# Patient Record
Sex: FEMALE | Race: OTHER | HISPANIC OR LATINO
[De-identification: names, ages, dates, MRNs, and addresses within clinical notes are randomized per-mention and may not be internally consistent; named-entity substitution may affect disease eponyms.]

---

## 2017-01-05 ENCOUNTER — APPOINTMENT (OUTPATIENT)
Dept: OTOLARYNGOLOGY | Facility: CLINIC | Age: 82
End: 2017-01-05

## 2017-01-05 VITALS
SYSTOLIC BLOOD PRESSURE: 122 MMHG | DIASTOLIC BLOOD PRESSURE: 78 MMHG | HEART RATE: 67 BPM | WEIGHT: 165 LBS | BODY MASS INDEX: 33.26 KG/M2 | HEIGHT: 59 IN

## 2017-01-05 DIAGNOSIS — H90.3 SENSORINEURAL HEARING LOSS, BILATERAL: ICD-10-CM

## 2017-01-05 DIAGNOSIS — H61.23 IMPACTED CERUMEN, BILATERAL: ICD-10-CM

## 2017-02-02 ENCOUNTER — MEDICATION RENEWAL (OUTPATIENT)
Age: 82
End: 2017-02-02

## 2017-02-03 ENCOUNTER — MEDICATION RENEWAL (OUTPATIENT)
Age: 82
End: 2017-02-03

## 2017-02-12 ENCOUNTER — INPATIENT (INPATIENT)
Facility: HOSPITAL | Age: 82
LOS: 3 days | Discharge: HOME | End: 2017-02-16
Attending: HOSPITALIST | Admitting: HOSPITALIST
Payer: MEDICAID

## 2017-02-12 VITALS
OXYGEN SATURATION: 97 % | RESPIRATION RATE: 18 BRPM | HEART RATE: 112 BPM | SYSTOLIC BLOOD PRESSURE: 132 MMHG | TEMPERATURE: 101 F | DIASTOLIC BLOOD PRESSURE: 78 MMHG

## 2017-02-12 DIAGNOSIS — Z90.49 ACQUIRED ABSENCE OF OTHER SPECIFIED PARTS OF DIGESTIVE TRACT: Chronic | ICD-10-CM

## 2017-02-12 DIAGNOSIS — A41.9 SEPSIS, UNSPECIFIED ORGANISM: ICD-10-CM

## 2017-02-12 LAB
ALBUMIN SERPL ELPH-MCNC: 3.2 G/DL — LOW (ref 3.3–5)
ALP SERPL-CCNC: 100 U/L — SIGNIFICANT CHANGE UP (ref 40–120)
ALT FLD-CCNC: 13 U/L — SIGNIFICANT CHANGE UP (ref 4–33)
APPEARANCE UR: SIGNIFICANT CHANGE UP
AST SERPL-CCNC: 11 U/L — SIGNIFICANT CHANGE UP (ref 4–32)
B PERT DNA SPEC QL NAA+PROBE: SIGNIFICANT CHANGE UP
BACTERIA # UR AUTO: HIGH
BASE EXCESS BLDV CALC-SCNC: 0.8 MMOL/L — SIGNIFICANT CHANGE UP
BASE EXCESS BLDV CALC-SCNC: 0.9 MMOL/L — SIGNIFICANT CHANGE UP
BASOPHILS # BLD AUTO: 0.05 K/UL — SIGNIFICANT CHANGE UP (ref 0–0.2)
BASOPHILS NFR BLD AUTO: 0.3 % — SIGNIFICANT CHANGE UP (ref 0–2)
BILIRUB SERPL-MCNC: 0.5 MG/DL — SIGNIFICANT CHANGE UP (ref 0.2–1.2)
BILIRUB UR-MCNC: NEGATIVE — SIGNIFICANT CHANGE UP
BLOOD GAS VENOUS - CREATININE: 0.7 MG/DL — SIGNIFICANT CHANGE UP (ref 0.5–1.3)
BLOOD GAS VENOUS - CREATININE: 0.73 MG/DL — SIGNIFICANT CHANGE UP (ref 0.5–1.3)
BLOOD UR QL VISUAL: HIGH
BUN SERPL-MCNC: 18 MG/DL — SIGNIFICANT CHANGE UP (ref 7–23)
C PNEUM DNA SPEC QL NAA+PROBE: NOT DETECTED — SIGNIFICANT CHANGE UP
CALCIUM SERPL-MCNC: 9.7 MG/DL — SIGNIFICANT CHANGE UP (ref 8.4–10.5)
CHLORIDE BLDV-SCNC: 101 MMOL/L — SIGNIFICANT CHANGE UP (ref 96–108)
CHLORIDE BLDV-SCNC: 104 MMOL/L — SIGNIFICANT CHANGE UP (ref 96–108)
CHLORIDE SERPL-SCNC: 95 MMOL/L — LOW (ref 98–107)
CO2 SERPL-SCNC: 20 MMOL/L — LOW (ref 22–31)
COLOR SPEC: YELLOW — SIGNIFICANT CHANGE UP
CREAT SERPL-MCNC: 0.82 MG/DL — SIGNIFICANT CHANGE UP (ref 0.5–1.3)
EOSINOPHIL # BLD AUTO: 0.09 K/UL — SIGNIFICANT CHANGE UP (ref 0–0.5)
EOSINOPHIL NFR BLD AUTO: 0.6 % — SIGNIFICANT CHANGE UP (ref 0–6)
FLUAV H1 2009 PAND RNA SPEC QL NAA+PROBE: NOT DETECTED — SIGNIFICANT CHANGE UP
FLUAV H1 RNA SPEC QL NAA+PROBE: NOT DETECTED — SIGNIFICANT CHANGE UP
FLUAV H3 RNA SPEC QL NAA+PROBE: NOT DETECTED — SIGNIFICANT CHANGE UP
FLUAV SUBTYP SPEC NAA+PROBE: SIGNIFICANT CHANGE UP
FLUBV RNA SPEC QL NAA+PROBE: NOT DETECTED — SIGNIFICANT CHANGE UP
GAS PNL BLDV: 130 MMOL/L — LOW (ref 136–146)
GAS PNL BLDV: 133 MMOL/L — LOW (ref 136–146)
GLUCOSE BLDV-MCNC: 216 — HIGH (ref 70–99)
GLUCOSE BLDV-MCNC: 255 — HIGH (ref 70–99)
GLUCOSE SERPL-MCNC: 258 MG/DL — HIGH (ref 70–99)
GLUCOSE UR-MCNC: SIGNIFICANT CHANGE UP
HADV DNA SPEC QL NAA+PROBE: NOT DETECTED — SIGNIFICANT CHANGE UP
HCO3 BLDV-SCNC: 25 MMOL/L — SIGNIFICANT CHANGE UP (ref 20–27)
HCO3 BLDV-SCNC: 25 MMOL/L — SIGNIFICANT CHANGE UP (ref 20–27)
HCOV 229E RNA SPEC QL NAA+PROBE: NOT DETECTED — SIGNIFICANT CHANGE UP
HCOV HKU1 RNA SPEC QL NAA+PROBE: NOT DETECTED — SIGNIFICANT CHANGE UP
HCOV NL63 RNA SPEC QL NAA+PROBE: NOT DETECTED — SIGNIFICANT CHANGE UP
HCOV OC43 RNA SPEC QL NAA+PROBE: NOT DETECTED — SIGNIFICANT CHANGE UP
HCT VFR BLD CALC: 33.9 % — LOW (ref 34.5–45)
HCT VFR BLDV CALC: 30.9 % — LOW (ref 34.5–45)
HCT VFR BLDV CALC: 34.6 % — SIGNIFICANT CHANGE UP (ref 34.5–45)
HGB BLD-MCNC: 11.1 G/DL — LOW (ref 11.5–15.5)
HGB BLDV-MCNC: 10 G/DL — LOW (ref 11.5–15.5)
HGB BLDV-MCNC: 11.2 G/DL — LOW (ref 11.5–15.5)
HMPV RNA SPEC QL NAA+PROBE: NOT DETECTED — SIGNIFICANT CHANGE UP
HPIV1 RNA SPEC QL NAA+PROBE: NOT DETECTED — SIGNIFICANT CHANGE UP
HPIV2 RNA SPEC QL NAA+PROBE: NOT DETECTED — SIGNIFICANT CHANGE UP
HPIV3 RNA SPEC QL NAA+PROBE: NOT DETECTED — SIGNIFICANT CHANGE UP
HPIV4 RNA SPEC QL NAA+PROBE: NOT DETECTED — SIGNIFICANT CHANGE UP
IMM GRANULOCYTES NFR BLD AUTO: 0.3 % — SIGNIFICANT CHANGE UP (ref 0–1.5)
KETONES UR-MCNC: NEGATIVE — SIGNIFICANT CHANGE UP
LACTATE BLDV-MCNC: 1.3 MMOL/L — SIGNIFICANT CHANGE UP (ref 0.5–2)
LACTATE BLDV-MCNC: 2.6 MMOL/L — HIGH (ref 0.5–2)
LEUKOCYTE ESTERASE UR-ACNC: HIGH
LYMPHOCYTES # BLD AUTO: 19.6 % — SIGNIFICANT CHANGE UP (ref 13–44)
LYMPHOCYTES # BLD AUTO: 3.12 K/UL — SIGNIFICANT CHANGE UP (ref 1–3.3)
M PNEUMO DNA SPEC QL NAA+PROBE: NOT DETECTED — SIGNIFICANT CHANGE UP
MCHC RBC-ENTMCNC: 30.9 PG — SIGNIFICANT CHANGE UP (ref 27–34)
MCHC RBC-ENTMCNC: 32.7 % — SIGNIFICANT CHANGE UP (ref 32–36)
MCV RBC AUTO: 94.4 FL — SIGNIFICANT CHANGE UP (ref 80–100)
MONOCYTES # BLD AUTO: 1.15 K/UL — HIGH (ref 0–0.9)
MONOCYTES NFR BLD AUTO: 7.2 % — SIGNIFICANT CHANGE UP (ref 2–14)
MUCOUS THREADS # UR AUTO: SIGNIFICANT CHANGE UP
NEUTROPHILS # BLD AUTO: 11.47 K/UL — HIGH (ref 1.8–7.4)
NEUTROPHILS NFR BLD AUTO: 72 % — SIGNIFICANT CHANGE UP (ref 43–77)
NITRITE UR-MCNC: POSITIVE — HIGH
PCO2 BLDV: 37 MMHG — LOW (ref 41–51)
PCO2 BLDV: 38 MMHG — LOW (ref 41–51)
PH BLDV: 7.43 PH — SIGNIFICANT CHANGE UP (ref 7.32–7.43)
PH BLDV: 7.44 PH — HIGH (ref 7.32–7.43)
PH UR: 5.5 — SIGNIFICANT CHANGE UP (ref 4.6–8)
PLATELET # BLD AUTO: 645 K/UL — HIGH (ref 150–400)
PMV BLD: 10.5 FL — SIGNIFICANT CHANGE UP (ref 7–13)
PO2 BLDV: 67 MMHG — HIGH (ref 35–40)
PO2 BLDV: 82 MMHG — HIGH (ref 35–40)
POTASSIUM BLDV-SCNC: 3.5 MMOL/L — SIGNIFICANT CHANGE UP (ref 3.4–4.5)
POTASSIUM BLDV-SCNC: 3.5 MMOL/L — SIGNIFICANT CHANGE UP (ref 3.4–4.5)
POTASSIUM SERPL-MCNC: 3.8 MMOL/L — SIGNIFICANT CHANGE UP (ref 3.5–5.3)
POTASSIUM SERPL-SCNC: 3.8 MMOL/L — SIGNIFICANT CHANGE UP (ref 3.5–5.3)
PROT SERPL-MCNC: 7.7 G/DL — SIGNIFICANT CHANGE UP (ref 6–8.3)
PROT UR-MCNC: 30 — HIGH
RBC # BLD: 3.59 M/UL — LOW (ref 3.8–5.2)
RBC # FLD: 12.9 % — SIGNIFICANT CHANGE UP (ref 10.3–14.5)
RBC CASTS # UR COMP ASSIST: HIGH (ref 0–?)
RSV RNA SPEC QL NAA+PROBE: NOT DETECTED — SIGNIFICANT CHANGE UP
RV+EV RNA SPEC QL NAA+PROBE: NOT DETECTED — SIGNIFICANT CHANGE UP
SAO2 % BLDV: 94.1 % — HIGH (ref 60–85)
SAO2 % BLDV: 96.8 % — HIGH (ref 60–85)
SODIUM SERPL-SCNC: 136 MMOL/L — SIGNIFICANT CHANGE UP (ref 135–145)
SP GR SPEC: 1.02 — SIGNIFICANT CHANGE UP (ref 1–1.03)
SQUAMOUS # UR AUTO: SIGNIFICANT CHANGE UP
UROBILINOGEN FLD QL: NORMAL E.U. — SIGNIFICANT CHANGE UP (ref 0.1–0.2)
WBC # BLD: 15.93 K/UL — HIGH (ref 3.8–10.5)
WBC # FLD AUTO: 15.93 K/UL — HIGH (ref 3.8–10.5)
WBC UR QL: >50 — HIGH (ref 0–?)

## 2017-02-12 PROCEDURE — 71010: CPT | Mod: 26

## 2017-02-12 RX ORDER — SODIUM CHLORIDE 9 MG/ML
1000 INJECTION INTRAMUSCULAR; INTRAVENOUS; SUBCUTANEOUS ONCE
Qty: 0 | Refills: 0 | Status: COMPLETED | OUTPATIENT
Start: 2017-02-12 | End: 2017-02-12

## 2017-02-12 RX ORDER — CEFTRIAXONE 500 MG/1
1 INJECTION, POWDER, FOR SOLUTION INTRAMUSCULAR; INTRAVENOUS ONCE
Qty: 0 | Refills: 0 | Status: COMPLETED | OUTPATIENT
Start: 2017-02-12 | End: 2017-02-12

## 2017-02-12 RX ORDER — ACETAMINOPHEN 500 MG
650 TABLET ORAL ONCE
Qty: 0 | Refills: 0 | Status: COMPLETED | OUTPATIENT
Start: 2017-02-12 | End: 2017-02-12

## 2017-02-12 RX ORDER — MIDAZOLAM HYDROCHLORIDE 1 MG/ML
2 INJECTION, SOLUTION INTRAMUSCULAR; INTRAVENOUS ONCE
Qty: 0 | Refills: 0 | Status: DISCONTINUED | OUTPATIENT
Start: 2017-02-12 | End: 2017-02-12

## 2017-02-12 RX ADMIN — MIDAZOLAM HYDROCHLORIDE 2 MILLIGRAM(S): 1 INJECTION, SOLUTION INTRAMUSCULAR; INTRAVENOUS at 21:26

## 2017-02-12 RX ADMIN — CEFTRIAXONE 100 GRAM(S): 500 INJECTION, POWDER, FOR SOLUTION INTRAMUSCULAR; INTRAVENOUS at 21:26

## 2017-02-12 RX ADMIN — SODIUM CHLORIDE 1000 MILLILITER(S): 9 INJECTION INTRAMUSCULAR; INTRAVENOUS; SUBCUTANEOUS at 21:01

## 2017-02-12 RX ADMIN — Medication 650 MILLIGRAM(S): at 22:23

## 2017-02-12 NOTE — ED ADULT TRIAGE NOTE - CHIEF COMPLAINT QUOTE
Cambodian speaking. history from daughter. pt with alzheimer's. reported fever this am with c/o abd pains.headache.  denies vomiting. decreased appetite Anguillan speaking. history from daughter. pt with alzheimer's. reported fever this am with c/o abd pains.headache.  denies vomiting. decreased appetite. fs 251

## 2017-02-12 NOTE — ED PROVIDER NOTE - ATTENDING CONTRIBUTION TO CARE
Seen and examined, AMS per family member with episodic agitation and lethargy, +fever, dark urine. Pt. hostile in triage but cooperative at time of exam. MM sl. dry, neck supple, clear lungs, shallow resp, unlabored, heart reg., soft, NT abd, no CVAT, moves all ext.

## 2017-02-12 NOTE — ED PROVIDER NOTE - OBJECTIVE STATEMENT
81F h/o Alzheimer's, HTN, DM, hypothyroid presenting with fever. Family notes pt has had subjective fever today, generalized weakness, confusion, agitation associated with dark urine over the past few days. Pt is agitated at being in hospital and not answering questions, consolable by family members. Denies cough, CP/SOB, N/V/D.

## 2017-02-12 NOTE — ED ADULT NURSE NOTE - OBJECTIVE STATEMENT
pt alert but confusing and agitated, ambulatory with assist, Botswanan speaking only. History from daughter. pt with alzheimer's and UTI. Reported fever this am with c/o abd pains and headache with weakness. Denies vomiting. Evaluated by provider. Blood obtained and sent. IV inserted. Right arm 20G. Will continue to monitor.

## 2017-02-12 NOTE — ED PROVIDER NOTE - MEDICAL DECISION MAKING DETAILS
81F h/o alzheimer's, hypothyroid, HTN presenting with fever, agitation, malaise with dark urine, concerning for underlying UTI  -labs, u/a, abx, cxr 81F h/o alzheimer's, hypothyroid, HTN presenting with fever, agitation, malaise with dark urine, likely delirium, concerning for underlying UTI  -labs, u/a, abx, cxr

## 2017-02-12 NOTE — ED ADULT NURSE REASSESSMENT NOTE - NS ED NURSE REASSESS COMMENT FT1
pt confusing and agitated to finish all the tests with anxiety. Due medication given as ordered. Cardiac monitor in place/ sinus. Will continue to monitor. Cxr on schedule. EKG on schedule.

## 2017-02-13 DIAGNOSIS — Z41.8 ENCOUNTER FOR OTHER PROCEDURES FOR PURPOSES OTHER THAN REMEDYING HEALTH STATE: ICD-10-CM

## 2017-02-13 DIAGNOSIS — I10 ESSENTIAL (PRIMARY) HYPERTENSION: ICD-10-CM

## 2017-02-13 DIAGNOSIS — A41.9 SEPSIS, UNSPECIFIED ORGANISM: ICD-10-CM

## 2017-02-13 DIAGNOSIS — E11.9 TYPE 2 DIABETES MELLITUS WITHOUT COMPLICATIONS: ICD-10-CM

## 2017-02-13 DIAGNOSIS — E03.9 HYPOTHYROIDISM, UNSPECIFIED: ICD-10-CM

## 2017-02-13 DIAGNOSIS — G30.9 ALZHEIMER'S DISEASE, UNSPECIFIED: ICD-10-CM

## 2017-02-13 DIAGNOSIS — E87.2 ACIDOSIS: ICD-10-CM

## 2017-02-13 DIAGNOSIS — D64.9 ANEMIA, UNSPECIFIED: ICD-10-CM

## 2017-02-13 LAB
BASOPHILS # BLD AUTO: 0.03 K/UL — SIGNIFICANT CHANGE UP (ref 0–0.2)
BASOPHILS NFR BLD AUTO: 0.2 % — SIGNIFICANT CHANGE UP (ref 0–2)
BUN SERPL-MCNC: 14 MG/DL — SIGNIFICANT CHANGE UP (ref 7–23)
CALCIUM SERPL-MCNC: 8.7 MG/DL — SIGNIFICANT CHANGE UP (ref 8.4–10.5)
CHLORIDE SERPL-SCNC: 103 MMOL/L — SIGNIFICANT CHANGE UP (ref 98–107)
CO2 SERPL-SCNC: 21 MMOL/L — LOW (ref 22–31)
CREAT SERPL-MCNC: 0.69 MG/DL — SIGNIFICANT CHANGE UP (ref 0.5–1.3)
EOSINOPHIL # BLD AUTO: 0.15 K/UL — SIGNIFICANT CHANGE UP (ref 0–0.5)
EOSINOPHIL NFR BLD AUTO: 1 % — SIGNIFICANT CHANGE UP (ref 0–6)
GLUCOSE SERPL-MCNC: 188 MG/DL — HIGH (ref 70–99)
HBA1C BLD-MCNC: 8.6 % — HIGH (ref 4–5.6)
HCT VFR BLD CALC: 33.3 % — LOW (ref 34.5–45)
HGB BLD-MCNC: 10.7 G/DL — LOW (ref 11.5–15.5)
IMM GRANULOCYTES NFR BLD AUTO: 0.2 % — SIGNIFICANT CHANGE UP (ref 0–1.5)
IRON SATN MFR SERPL: 169 UG/DL — SIGNIFICANT CHANGE UP (ref 140–530)
IRON SATN MFR SERPL: 28 UG/DL — LOW (ref 30–160)
LYMPHOCYTES # BLD AUTO: 18.7 % — SIGNIFICANT CHANGE UP (ref 13–44)
LYMPHOCYTES # BLD AUTO: 2.83 K/UL — SIGNIFICANT CHANGE UP (ref 1–3.3)
MAGNESIUM SERPL-MCNC: 1.7 MG/DL — SIGNIFICANT CHANGE UP (ref 1.6–2.6)
MCHC RBC-ENTMCNC: 30.7 PG — SIGNIFICANT CHANGE UP (ref 27–34)
MCHC RBC-ENTMCNC: 32.1 % — SIGNIFICANT CHANGE UP (ref 32–36)
MCV RBC AUTO: 95.4 FL — SIGNIFICANT CHANGE UP (ref 80–100)
MONOCYTES # BLD AUTO: 0.91 K/UL — HIGH (ref 0–0.9)
MONOCYTES NFR BLD AUTO: 6 % — SIGNIFICANT CHANGE UP (ref 2–14)
NEUTROPHILS # BLD AUTO: 11.19 K/UL — HIGH (ref 1.8–7.4)
NEUTROPHILS NFR BLD AUTO: 73.9 % — SIGNIFICANT CHANGE UP (ref 43–77)
PHOSPHATE SERPL-MCNC: 2.5 MG/DL — SIGNIFICANT CHANGE UP (ref 2.5–4.5)
PLATELET # BLD AUTO: 564 K/UL — HIGH (ref 150–400)
PMV BLD: 10.1 FL — SIGNIFICANT CHANGE UP (ref 7–13)
POTASSIUM SERPL-MCNC: 3.9 MMOL/L — SIGNIFICANT CHANGE UP (ref 3.5–5.3)
POTASSIUM SERPL-SCNC: 3.9 MMOL/L — SIGNIFICANT CHANGE UP (ref 3.5–5.3)
RBC # BLD: 3.49 M/UL — LOW (ref 3.8–5.2)
RBC # FLD: 13.2 % — SIGNIFICANT CHANGE UP (ref 10.3–14.5)
SODIUM SERPL-SCNC: 140 MMOL/L — SIGNIFICANT CHANGE UP (ref 135–145)
SPECIMEN SOURCE: SIGNIFICANT CHANGE UP
SPECIMEN SOURCE: SIGNIFICANT CHANGE UP
TSH SERPL-MCNC: 0.29 UIU/ML — SIGNIFICANT CHANGE UP (ref 0.27–4.2)
UIBC SERPL-MCNC: 141 UG/DL — SIGNIFICANT CHANGE UP (ref 110–370)
VIT B12 SERPL-MCNC: 286 PG/ML — SIGNIFICANT CHANGE UP (ref 200–900)
WBC # BLD: 15.14 K/UL — HIGH (ref 3.8–10.5)
WBC # FLD AUTO: 15.14 K/UL — HIGH (ref 3.8–10.5)

## 2017-02-13 PROCEDURE — 76770 US EXAM ABDO BACK WALL COMP: CPT | Mod: 26

## 2017-02-13 PROCEDURE — 99223 1ST HOSP IP/OBS HIGH 75: CPT | Mod: GC

## 2017-02-13 PROCEDURE — 76775 US EXAM ABDO BACK WALL LIM: CPT | Mod: 26

## 2017-02-13 RX ORDER — HALOPERIDOL DECANOATE 100 MG/ML
1 INJECTION INTRAMUSCULAR ONCE
Qty: 0 | Refills: 0 | Status: COMPLETED | OUTPATIENT
Start: 2017-02-13 | End: 2017-02-13

## 2017-02-13 RX ORDER — DEXTROSE 50 % IN WATER 50 %
25 SYRINGE (ML) INTRAVENOUS ONCE
Qty: 0 | Refills: 0 | Status: DISCONTINUED | OUTPATIENT
Start: 2017-02-13 | End: 2017-02-16

## 2017-02-13 RX ORDER — GLUCAGON INJECTION, SOLUTION 0.5 MG/.1ML
1 INJECTION, SOLUTION SUBCUTANEOUS ONCE
Qty: 0 | Refills: 0 | Status: DISCONTINUED | OUTPATIENT
Start: 2017-02-13 | End: 2017-02-16

## 2017-02-13 RX ORDER — CEFTRIAXONE 500 MG/1
1 INJECTION, POWDER, FOR SOLUTION INTRAMUSCULAR; INTRAVENOUS EVERY 24 HOURS
Qty: 0 | Refills: 0 | Status: DISCONTINUED | OUTPATIENT
Start: 2017-02-13 | End: 2017-02-16

## 2017-02-13 RX ORDER — ACETAMINOPHEN 500 MG
650 TABLET ORAL EVERY 6 HOURS
Qty: 0 | Refills: 0 | Status: DISCONTINUED | OUTPATIENT
Start: 2017-02-13 | End: 2017-02-16

## 2017-02-13 RX ORDER — ENOXAPARIN SODIUM 100 MG/ML
40 INJECTION SUBCUTANEOUS EVERY 24 HOURS
Qty: 0 | Refills: 0 | Status: DISCONTINUED | OUTPATIENT
Start: 2017-02-13 | End: 2017-02-16

## 2017-02-13 RX ORDER — LEVOTHYROXINE SODIUM 125 MCG
100 TABLET ORAL DAILY
Qty: 0 | Refills: 0 | Status: DISCONTINUED | OUTPATIENT
Start: 2017-02-13 | End: 2017-02-16

## 2017-02-13 RX ORDER — SODIUM CHLORIDE 9 MG/ML
1000 INJECTION INTRAMUSCULAR; INTRAVENOUS; SUBCUTANEOUS ONCE
Qty: 0 | Refills: 0 | Status: COMPLETED | OUTPATIENT
Start: 2017-02-13 | End: 2017-02-13

## 2017-02-13 RX ORDER — INSULIN LISPRO 100/ML
VIAL (ML) SUBCUTANEOUS AT BEDTIME
Qty: 0 | Refills: 0 | Status: DISCONTINUED | OUTPATIENT
Start: 2017-02-13 | End: 2017-02-16

## 2017-02-13 RX ORDER — INFLUENZA VIRUS VACCINE 15; 15; 15; 15 UG/.5ML; UG/.5ML; UG/.5ML; UG/.5ML
0.5 SUSPENSION INTRAMUSCULAR ONCE
Qty: 0 | Refills: 0 | Status: COMPLETED | OUTPATIENT
Start: 2017-02-13 | End: 2017-02-13

## 2017-02-13 RX ORDER — SODIUM CHLORIDE 9 MG/ML
1000 INJECTION INTRAMUSCULAR; INTRAVENOUS; SUBCUTANEOUS
Qty: 0 | Refills: 0 | Status: DISCONTINUED | OUTPATIENT
Start: 2017-02-13 | End: 2017-02-16

## 2017-02-13 RX ORDER — SODIUM CHLORIDE 9 MG/ML
1000 INJECTION, SOLUTION INTRAVENOUS
Qty: 0 | Refills: 0 | Status: DISCONTINUED | OUTPATIENT
Start: 2017-02-13 | End: 2017-02-16

## 2017-02-13 RX ORDER — ASPIRIN/CALCIUM CARB/MAGNESIUM 324 MG
81 TABLET ORAL DAILY
Qty: 0 | Refills: 0 | Status: DISCONTINUED | OUTPATIENT
Start: 2017-02-13 | End: 2017-02-16

## 2017-02-13 RX ORDER — INSULIN LISPRO 100/ML
VIAL (ML) SUBCUTANEOUS
Qty: 0 | Refills: 0 | Status: DISCONTINUED | OUTPATIENT
Start: 2017-02-13 | End: 2017-02-16

## 2017-02-13 RX ORDER — DEXTROSE 50 % IN WATER 50 %
12.5 SYRINGE (ML) INTRAVENOUS ONCE
Qty: 0 | Refills: 0 | Status: DISCONTINUED | OUTPATIENT
Start: 2017-02-13 | End: 2017-02-16

## 2017-02-13 RX ORDER — DEXTROSE 50 % IN WATER 50 %
1 SYRINGE (ML) INTRAVENOUS ONCE
Qty: 0 | Refills: 0 | Status: DISCONTINUED | OUTPATIENT
Start: 2017-02-13 | End: 2017-02-16

## 2017-02-13 RX ADMIN — Medication 1 TABLET(S): at 13:11

## 2017-02-13 RX ADMIN — Medication 100 MICROGRAM(S): at 07:05

## 2017-02-13 RX ADMIN — Medication 1 TABLET(S): at 22:43

## 2017-02-13 RX ADMIN — Medication 81 MILLIGRAM(S): at 11:16

## 2017-02-13 RX ADMIN — SODIUM CHLORIDE 75 MILLILITER(S): 9 INJECTION INTRAMUSCULAR; INTRAVENOUS; SUBCUTANEOUS at 13:12

## 2017-02-13 RX ADMIN — Medication 1: at 18:07

## 2017-02-13 RX ADMIN — HALOPERIDOL DECANOATE 1 MILLIGRAM(S): 100 INJECTION INTRAMUSCULAR at 22:43

## 2017-02-13 RX ADMIN — SODIUM CHLORIDE 75 MILLILITER(S): 9 INJECTION INTRAMUSCULAR; INTRAVENOUS; SUBCUTANEOUS at 03:30

## 2017-02-13 RX ADMIN — ENOXAPARIN SODIUM 40 MILLIGRAM(S): 100 INJECTION SUBCUTANEOUS at 11:16

## 2017-02-13 RX ADMIN — Medication 1 TABLET(S): at 07:05

## 2017-02-13 RX ADMIN — SODIUM CHLORIDE 1000 MILLILITER(S): 9 INJECTION INTRAMUSCULAR; INTRAVENOUS; SUBCUTANEOUS at 01:15

## 2017-02-13 NOTE — H&P ADULT. - PROBLEM SELECTOR PLAN 6
– Per dtr report pt on Aricept 5 qd, however outpt notes say pt was switched to namenda due to dry mouth. Severity of baseline dementia suggests both meds may no longer be beneficial either way, will hold for now  – Pt calm currently, redirectable by family. QTc 427, if agitated will consider starting seroquel (pt may benefit from remeron given dec appetite)

## 2017-02-13 NOTE — DISCHARGE NOTE ADULT - HOSPITAL COURSE
80F w/PMHx HTN, T2DM (NIDDM), hypothyroidism, osteoporosis, Alzheimer’s dementia (AAOx1 @baseline) who p/w generalized weakness, confusion, and agitation x3d and fever x1d. Pt brought in by family, agitated, refusing to answer questions, hx obtained primarily by family at bedside and from EMR/chart review. German-speaking from [facility], refusing  phone. Per dtr, pt has been having worsening confusion/agitation x3d w/weakness to the point she couldn’t stand, dec PO intake x1d, and had fever to 102 prompting ED visit. Dtr says pt did not complain of CP/palp/SOB, cough, abd pain, N/V/D, dysuria, hematuria, or flank pain. Of note, in 12/2016 dtr brought pt to gynecologist for foul-smelling, dark urine, ucx grew pan-sensitive E. coli (>[id number]), tx w/10d course of macrobid w/resolution of sx. Dtr denies dark, foul-smelling urine, but states urine is "more yellow" than usual. Dtr also notes pt has remote hx (>20y ago) of large "calcium" renal stones x3, requiring surgical removal.  In ED febrile to 102.3, tachycardic to 112, normotensive, satting well on RA. CBC w/leukocytosis, normocytic anemia (11.1 down from baseline ~13 on outpt labs), thrombocytosis. BMP w/no renal dysfunction, no major electrolyte abn, hyperglycemia to 258, HAGMA (HCO3 20, AG 23). LFTs WNL. VBG w/low PCO2 (38), lactate 2.6, down to 1.3 on repeat. UA grossly positive w/bacteriuria, pyuria, large LE, positive nitrite. Also noted moderate hematuria. RVP neg. CXR w/grossly clear lungs. Pt given ceftriaxone, 2LNS bolus, Tylenol 650 Pox1, versed 2mg IVP x1 for agitation, and admitted to medicine.    Patient was admitted to the general medicine floors for further management. A renal sonogram showed marked R hydronephrosis with a mass-like lesion in the R renal pelvis extending into the proximal R ureter. A CT a/p w/ IV contrast was obtained to assess showing ________ 80F w/PMHx HTN, T2DM (NIDDM), hypothyroidism, osteoporosis, Alzheimer’s dementia (AAOx1 @baseline) who p/w generalized weakness, confusion, and agitation x3d and fever x1d. Pt brought in by family, agitated, refusing to answer questions, hx obtained primarily by family at bedside and from EMR/chart review. [place], refusing  phone. Per dtr, pt has been having worsening confusion/agitation x3d w/weakness to the point she couldn’t stand, dec PO intake x1d, and had fever to 102 prompting ED visit. Dtr says pt did not complain of CP/palp/SOB, cough, abd pain, N/V/D, dysuria, hematuria, or flank pain. Of note, in 12/2016 dtr brought pt to gynecologist for foul-smelling, dark urine, ucx grew pan-sensitive E. coli (>[id number]), tx w/10d course of macrobid w/resolution of sx. Dtr denies dark, foul-smelling urine, but states urine is "more yellow" than usual. Dtr also notes pt has remote hx (>20y ago) of large "calcium" renal stones x3, requiring surgical removal.  In ED febrile to 102.3, tachycardic to 112, normotensive, satting well on RA. CBC w/leukocytosis, normocytic anemia (11.1 down from baseline ~13 on outpt labs), thrombocytosis. BMP w/no renal dysfunction, no major electrolyte abn, hyperglycemia to 258, HAGMA (HCO3 20, AG 23). LFTs WNL. VBG w/low PCO2 (38), lactate 2.6, down to 1.3 on repeat. UA grossly positive w/bacteriuria, pyuria, large LE, positive nitrite. Also noted moderate hematuria. RVP neg. CXR w/grossly clear lungs. Pt given ceftriaxone, 2LNS bolus, Tylenol 650 Pox1, versed 2mg IVP x1 for agitation, and admitted to medicine.    Patient was admitted to the general medicine floors for further management. A renal sonogram showed marked R hydronephrosis with a mass-like lesion in the R renal pelvis extending into the proximal R ureter. A CT a/p w/ IV contrast was obtained to assess confirming marked right hydroureteronephrosis with site of obstruction at the level of the iliac vessels. A heterogeneous nonenhancing masslike density in the right renal pelvis most likely representing an inflammatory mass. An incidental phlegmon in the right psoas muscle adjacent to the renal pelvis was detected for which IR was consulted, will not be drained and will monitor. Urology consulted and recommended for renal lasix scan which showed abnormal diuretic renal scan.    Markedly decreased flow to and function of  the right kidney with   evidence of obstruction.     Normal flow to and function of  the left kidney with no evidence of   obstruction.     Differential renal function: Right kidney: 16%; Left kidney: 84%. 80F w/PMHx HTN, T2DM (NIDDM), hypothyroidism, osteoporosis, Alzheimer’s dementia (AAOx1 @baseline) who p/w generalized weakness, confusion, and agitation x3d and fever x1d. Pt brought in by family, agitated, refusing to answer questions, hx obtained primarily by family at bedside and from EMR/chart review. Telugu-speaking from [facility], refusing  phone. Per dtr, pt has been having worsening confusion/agitation x3d w/weakness to the point she couldn’t stand, dec PO intake x1d, and had fever to 102 prompting ED visit. Dtr says pt did not complain of CP/palp/SOB, cough, abd pain, N/V/D, dysuria, hematuria, or flank pain. Of note, in 12/2016 dtr brought pt to gynecologist for foul-smelling, dark urine, ucx grew pan-sensitive E. coli (>[id number]), tx w/10d course of macrobid w/resolution of sx. Dtr denies dark, foul-smelling urine, but states urine is "more yellow" than usual. Dtr also notes pt has remote hx (>20y ago) of large "calcium" renal stones x3, requiring surgical removal.  In ED febrile to 102.3, tachycardic to 112, normotensive, satting well on RA. CBC w/leukocytosis, normocytic anemia (11.1 down from baseline ~13 on outpt labs), thrombocytosis. BMP w/no renal dysfunction, no major electrolyte abn, hyperglycemia to 258, HAGMA (HCO3 20, AG 23). LFTs WNL. VBG w/low PCO2 (38), lactate 2.6, down to 1.3 on repeat. UA grossly positive w/bacteriuria, pyuria, large LE, positive nitrite. Also noted moderate hematuria. RVP neg. CXR w/grossly clear lungs. Pt given ceftriaxone, 2LNS bolus, Tylenol 650 Pox1, versed 2mg IVP x1 for agitation, and admitted to medicine.    Patient was admitted to the general medicine floors for further management. A renal sonogram showed marked R hydronephrosis with a mass-like lesion in the R renal pelvis extending into the proximal R ureter. A CT a/p w/ IV contrast was obtained to assess, confirming marked right hydroureteronephrosis with site of obstruction at the level of the iliac vessels. A heterogeneous nonenhancing masslike density in the right renal pelvis most likely representing an inflammatory mass. An incidental phlegmon in the right psoas muscle adjacent to the renal pelvis was detected for which IR was consulted, will not be drained and will monitor. Urology consulted and recommended for renal lasix scan which showed abnormal diuretic renal scan. There was markedly decreased flow to and function of  the right kidney with evidence of obstruction. There was normal flow to and function of  the left kidney with no evidence of obstruction. The differential renal function was Right kidney: 16%; Left kidney: 84%. Urology aware and recommended follow-up as an outpatient for uteroscopy. Appointment made for 3/3/17 @ 2pm with Dr. Anderson as preferred by patient's family. Urine culture positive for pan-sensitive E. coli for which pt will be sent home on keflex x 3 days for a 7 day total course of antibiotics. Patient medically stable on discharge. 80F w/PMHx HTN, T2DM (NIDDM), hypothyroidism, osteoporosis, Alzheimer’s dementia (AAOx1 @baseline) who p/w generalized weakness, confusion, and agitation x3d and fever x1d. Pt brought in by family, agitated, refusing to answer questions, hx obtained primarily by family at bedside and from EMR/chart review. [facility]. Per dtr, pt has been having worsening confusion/agitation x3d w/weakness to the point she couldn’t stand, dec PO intake x1d, and had fever to 102 prompting ED visit. Dtr says pt did not complain of CP/palp/SOB, cough, abd pain, N/V/D, dysuria, hematuria, or flank pain. Of note, in 12/2016 dtr brought pt to gynecologist for foul-smelling, dark urine, ucx grew pan-sensitive E. coli (>[id number]), tx w/10d course of macrobid w/resolution of sx. Dtr denies dark, foul-smelling urine, but states urine is "more yellow" than usual. Dtr also notes pt has remote hx (>20y ago) of large "calcium" renal stones x3, requiring surgical removal.  In ED febrile to 102.3, tachycardic to 112, normotensive, satting well on RA. CBC w/leukocytosis, normocytic anemia (11.1 down from baseline ~13 on outpt labs), thrombocytosis. BMP w/no renal dysfunction, no major electrolyte abn, hyperglycemia to 258, HAGMA (HCO3 20, AG 23). LFTs WNL. VBG w/low PCO2 (38), lactate 2.6, down to 1.3 on repeat. UA grossly positive w/bacteriuria, pyuria, large LE, positive nitrite. Also noted moderate hematuria. RVP neg. CXR w/grossly clear lungs. Pt given ceftriaxone, 2LNS bolus, Tylenol 650 Pox1, versed 2mg IVP x1 for agitation, and admitted to medicine.    Patient was admitted to the general medicine floors for further management. A renal sonogram showed marked R hydronephrosis with a mass-like lesion in the R renal pelvis extending into the proximal R ureter. A CT a/p w/ IV contrast was obtained to assess, confirming marked right hydroureteronephrosis with site of obstruction at the level of the iliac vessels. A heterogeneous nonenhancing masslike density in the right renal pelvis most likely representing an inflammatory mass. An incidental phlegmon in the right psoas muscle adjacent to the renal pelvis was detected for which IR was consulted, will not be drained and will monitor. Urology consulted and recommended for renal lasix scan which showed abnormal diuretic renal scan. There was markedly decreased flow to and function of  the right kidney with evidence of obstruction. There was normal flow to and function of  the left kidney with no evidence of obstruction. The differential renal function was Right kidney: 16%; Left kidney: 84%. Urology aware and recommended follow-up as an outpatient for uteroscopy. Appointment made for 3/3/17 @ 2pm with Dr. Anderson as preferred by patient's family. Urine culture positive for pan-sensitive E. coli for which pt will be sent home on keflex for a 14 day total course of antibiotics. Patient medically stable on discharge.

## 2017-02-13 NOTE — H&P ADULT. - PROBLEM SELECTOR PLAN 4
– Per dtr pt on synthroid 100 qd, however outpt records indicate 75qd. TSH 11/2016 WNL, will c/w synthroid 100 and check TSH in AM

## 2017-02-13 NOTE — H&P ADULT. - LAB RESULTS AND INTERPRETATION
Labs personally reviewed.CBC w/leukocytosis, normocytic anemia (11.1 down from baseline ~13 on outpt labs), thrombocytosis. BMP w/no renal dysfunction, no major electrolyte abn, hyperglycemia to 258, HAGMA (HCO3 20, AG 23). LFTs WNL. VBG w/low PCO2 (38), lactate 2.6, down to 1.3 on repeat. UA grossly positive w/bacteriuria, pyuria, large LE, positive nitrite. Also noted moderate hematuria. RVP neg.

## 2017-02-13 NOTE — DISCHARGE NOTE ADULT - CARE PROVIDERS DIRECT ADDRESSES
[email or web address]

## 2017-02-13 NOTE — DISCHARGE NOTE ADULT - PLAN OF CARE
Resolution Please follow-up with Urology Clinic (Dr. Anderson) on _________ for further assessment and ureteroscopy. If symptoms worsen, please return to the ED. Please continue with synthroid and follow-up with your PCP Dr. Guerra for further management. Please maintain a carbohydrate consistent diet and follow-up with Dr. Guerra within 2-4 weeks after discharge for further management. Please monitor blood pressure at home and continue anti-HTN medications and follow-up with Dr. Guerra for further management. Please follow-up with Dr. Guerra or your neurologist for further management. Please follow-up with [facility]rology Clinic (Dr. Anderson) on March 3, 2017 @ 2PM for further assessment and ureteroscopy. If symptoms worsen, please return to the ED. Please maintain a carbohydrate consistent diet and follow-up with Dr. Guerra for T2DM management on 5/1/2017 @ 3:40PM, instructions left for PCP office to call for more recent openings. Please continue keflex 500mg twice daily x 3 days to complete a 7 day course for UTI. Please follow-up with Urology Clinic (Dr. Anderson) on March 3, 2017 @ 2PM for further assessment and ureteroscopy. If symptoms worsen, please return to the ED. Please continue keflex 500mg twice daily to complete a 14 day course for UTI. Please follow-up with Urology Clinic (Dr. Anderson) on March 3, 2017 @ 2PM for further assessment and ureteroscopy. If symptoms worsen, please return to the ED.

## 2017-02-13 NOTE — H&P ADULT. - ASSESSMENT
80F w/PMHx HTN, T2DM (NIDDM), hypothyroidism, osteoporosis, Alzheimer’s dementia (AAOx1 @baseline) who p/w generalized weakness, confusion, and agitation x3d and fever x1d, found to have UTI.

## 2017-02-13 NOTE — DISCHARGE NOTE ADULT - CARE PLAN
Principal Discharge DX:	Sepsis secondary to UTI  Goal:	Resolution  Instructions for follow-up, activity and diet:	Please follow-up with Urology Clinic (Dr. Anderson) on _________ for further assessment and ureteroscopy. If symptoms worsen, please return to the ED.  Secondary Diagnosis:	Hypothyroid  Instructions for follow-up, activity and diet:	Please continue with synthroid and follow-up with your PCP Dr. Guerra for further management.  Secondary Diagnosis:	T2DM (type 2 diabetes mellitus)  Instructions for follow-up, activity and diet:	Please maintain a carbohydrate consistent diet and follow-up with Dr. Guerra within 2-4 weeks after discharge for further management.  Secondary Diagnosis:	HTN (hypertension)  Instructions for follow-up, activity and diet:	Please monitor blood pressure at home and continue anti-HTN medications and follow-up with Dr. Guerra for further management.  Secondary Diagnosis:	Alzheimer disease  Instructions for follow-up, activity and diet:	Please follow-up with Dr. Guerra or your neurologist for further management. Principal Discharge DX:	Sepsis secondary to UTI  Goal:	Resolution  Instructions for follow-up, activity and diet:	Please follow-up with Urology Clinic (Dr. Anderson) on March 3, 2017 @ 2PM for further assessment and ureteroscopy. If symptoms worsen, please return to the ED.  Secondary Diagnosis:	Hypothyroid  Instructions for follow-up, activity and diet:	Please continue with synthroid and follow-up with your PCP Dr. Guerra for further management.  Secondary Diagnosis:	T2DM (type 2 diabetes mellitus)  Instructions for follow-up, activity and diet:	Please maintain a carbohydrate consistent diet and follow-up with Dr. Guerra within 2-4 weeks after discharge for further management.  Secondary Diagnosis:	HTN (hypertension)  Instructions for follow-up, activity and diet:	Please monitor blood pressure at home and continue anti-HTN medications and follow-up with Dr. Guerra for further management.  Secondary Diagnosis:	Alzheimer disease  Instructions for follow-up, activity and diet:	Please follow-up with Dr. Guerra or your neurologist for further management. Principal Discharge DX:	Sepsis secondary to UTI  Goal:	Resolution  Instructions for follow-up, activity and diet:	Please follow-up with Urology Clinic (Dr. Anderson) on March 3, 2017 @ 2PM for further assessment and ureteroscopy. If symptoms worsen, please return to the ED.  Secondary Diagnosis:	Hypothyroid  Instructions for follow-up, activity and diet:	Please continue with synthroid and follow-up with your PCP Dr. Guerra for further management.  Secondary Diagnosis:	T2DM (type 2 diabetes mellitus)  Instructions for follow-up, activity and diet:	Please maintain a carbohydrate consistent diet and follow-up with Dr. Guerra for T2DM management on 5/1/2017 @ 3:40PM, instructions left for PCP office to call for more recent openings.  Secondary Diagnosis:	HTN (hypertension)  Instructions for follow-up, activity and diet:	Please monitor blood pressure at home and continue anti-HTN medications and follow-up with Dr. Guerra for further management.  Secondary Diagnosis:	Alzheimer disease  Instructions for follow-up, activity and diet:	Please follow-up with Dr. Guerra or your neurologist for further management. Principal Discharge DX:	Sepsis secondary to UTI  Goal:	Resolution  Instructions for follow-up, activity and diet:	Please continue keflex 500mg twice daily x 3 days to complete a 7 day course for UTI. Please follow-up with Urology Clinic (Dr. Anderson) on March 3, 2017 @ 2PM for further assessment and ureteroscopy. If symptoms worsen, please return to the ED.  Secondary Diagnosis:	Hypothyroid  Instructions for follow-up, activity and diet:	Please continue with synthroid and follow-up with your PCP Dr. Guerra for further management.  Secondary Diagnosis:	T2DM (type 2 diabetes mellitus)  Instructions for follow-up, activity and diet:	Please maintain a carbohydrate consistent diet and follow-up with Dr. Guerra for T2DM management on 5/1/2017 @ 3:40PM, instructions left for PCP office to call for more recent openings.  Secondary Diagnosis:	HTN (hypertension)  Instructions for follow-up, activity and diet:	Please monitor blood pressure at home and continue anti-HTN medications and follow-up with Dr. Guerra for further management.  Secondary Diagnosis:	Alzheimer disease  Instructions for follow-up, activity and diet:	Please follow-up with Dr. Guerra or your neurologist for further management. Principal Discharge DX:	Sepsis secondary to UTI  Goal:	Resolution  Instructions for follow-up, activity and diet:	Please continue keflex 500mg twice daily to complete a 14 day course for UTI. Please follow-up with Urology Clinic (Dr. Anderson) on March 3, 2017 @ 2PM for further assessment and ureteroscopy. If symptoms worsen, please return to the ED.  Secondary Diagnosis:	Hypothyroid  Instructions for follow-up, activity and diet:	Please continue with synthroid and follow-up with your PCP Dr. Guerra for further management.  Secondary Diagnosis:	T2DM (type 2 diabetes mellitus)  Instructions for follow-up, activity and diet:	Please maintain a carbohydrate consistent diet and follow-up with Dr. Guerra for T2DM management on 5/1/2017 @ 3:40PM, instructions left for PCP office to call for more recent openings.  Secondary Diagnosis:	HTN (hypertension)  Instructions for follow-up, activity and diet:	Please monitor blood pressure at home and continue anti-HTN medications and follow-up with Dr. Guerra for further management.  Secondary Diagnosis:	Alzheimer disease  Instructions for follow-up, activity and diet:	Please follow-up with Dr. Guerra or your neurologist for further management.

## 2017-02-13 NOTE — H&P ADULT. - COMMENTS
Unable to obtain as pt refused to answer questions, agitated on awakening, told dtr to leave her alone

## 2017-02-13 NOTE — DISCHARGE NOTE ADULT - CARE PROVIDER_API CALL
Hipolito Meza), Internal Medicine  [address] [place] [id number]  Phone: [phone]  Fax: [phone]    Babak Anderson), Urology  [address] [place] [id number]  Phone: [phone]  Fax: [phone]

## 2017-02-13 NOTE — DISCHARGE NOTE ADULT - PATIENT PORTAL LINK FT
“You can access the Follow[facility] Patient Portal, offered by [facility], by registering with the following website: [email or web address]”

## 2017-02-13 NOTE — H&P ADULT. - PROBLEM SELECTOR PLAN 5
– Per dtr pt on valsartan 80, however outpt records indicate 40qd. Will hold for now given severe sepsis, clarify in AM

## 2017-02-13 NOTE — PATIENT PROFILE ADULT. - LANGUAGE ASSISTANCE NEEDED
No-Patient/Caregiver offered and refused free interpretation services./Daughter at bedside and does translation

## 2017-02-13 NOTE — H&P ADULT. - PROBLEM SELECTOR PLAN 1
– UA grossly positive, likely explanation of sx. Ucx 12/2016 w/pan-sensitive E. coli, will c/w empiric ceftriaxone, can transition to PO as sx improve. F/u Ucx, bld cx  – Given h/o renal stone and hematuria, will get renal u/s to assess for underlying stone (may need CT renal stone hunt)  – Febrile, septic, elevated lactate consistent w/severe sepsis, lactate imp s/p 2LNS bolus, will c/w gentle IVF given low PO intake, monitor VS, trend lactate

## 2017-02-13 NOTE — H&P ADULT. - PROBLEM SELECTOR PLAN 7
– Mildly reduced from baseline, no overt bleeding (hematuria on UA, no gross hematuria)  – In outpt records pt reportedly with h/o Fe and B12 def, last labs WNL, not on supp. Will check Fe studies, B12 level, monitor CBC  – Reactive thrombocytosis likely 2/2 anemia/sepsis

## 2017-02-13 NOTE — DISCHARGE NOTE ADULT - MEDICATION SUMMARY - MEDICATIONS TO TAKE
I will START or STAY ON the medications listed below when I get home from the hospital:    aspirin 81 mg oral delayed release tablet  -- 1 tab(s) by mouth once a day  -- Indication: For CAD    Diovan 80 mg oral tablet  -- 1 tab(s) by mouth once a day  -- Indication: For CAD    metFORMIN 1000 mg oral tablet, extended release  -- 1 tab(s) by mouth once a day  -- Indication: For T2DM (type 2 diabetes mellitus)    glipiZIDE 2.5 mg oral tablet, extended release  -- 1 tab(s) by mouth once a day  -- Indication: For T2DM (type 2 diabetes mellitus)    alendronate 70 mg oral tablet  -- 1 tab(s) by mouth once a week  -- Indication: For osteoporosis    Keflex 500 mg oral capsule  -- 1 cap(s) by mouth 2 times a day MDD:two tablets daily  -- Finish all this medication unless otherwise directed by prescriber.    -- Indication: For UTI    donepezil 5 mg oral tablet  -- 1 tab(s) by mouth once a day (at bedtime)  -- Indication: For Alzheimer disease    Synthroid 100 mcg (0.1 mg) oral tablet  -- 1 tab(s) by mouth once a day  -- Indication: For Hypothyroid    Os-Esdras Calcium+D3 oral tablet  -- 1 tab(s) by mouth 3 times a day  -- Indication: For osteoporosis

## 2017-02-13 NOTE — H&P ADULT. - HISTORY OF PRESENT ILLNESS
80F w/PMHx HTN, T2DM, hypothyroidism, Alzheimer’s dementia who p/w generalized weakness, confusion, and agitation x3d w/assoc dark, foul smelling urine, and fever x1d. Pt brought in by family, agitated, hx obtained primarily by family at bedside and from EMR/chart review. Mongolian-speaking from [facility], refusing  phone.  Of note, ucx as outpt (12/2016) grew pan-sensitive E. coli (>[id number]), tx w/5d course of macrobid.  In ED febrile to 102.3, tachycardic to 112, normotensive, satting well on RA. CBC w/leukocytosis, normocytic anemia (11.1 down from baseline ~13 on outpt labs), thrombocytosis. BMP w/no renal dysfunction, no major electrolyte abn, hyperglycemia to 258, HAGMA (HCO3 20, AG 23). LFTs WNL. VBG w/low PCO2 (38), lactate 2.6, down to 1.3 on repeat. UA grossly positive w/bacteriuria, pyuria, large LE, positive nitrite. Also noted moderate hematuria. RVP neg. CXR w/grossly clear lungs. Pt given ceftriaxone, 2LNS bolus, Tylenol 650 Pox1, versed 2mg IVP x1 for agitation, and admitted to medicine. 80F w/PMHx HTN, T2DM (NIDDM), hypothyroidism, osteoporosis, Alzheimer’s dementia (AAOx1 @baseline) who p/w generalized weakness, confusion, and agitation x3d and fever x1d. Pt brought in by family, agitated, refusing to answer questions, hx obtained primarily by family at bedside and from EMR/chart review. Tajik-speaking from [facility], refusing  phone. Per dtr, pt has been having worsening confusion/agitation x3d w/weakness to the point she couldn’t stand, dec PO intake x1d, and had fever to 102 prompting ED visit. Dtr says pt did not complain of CP/palp/SOB, cough, abd pain, N/V/D, dysuria, hematuria, or flank pain. Of note, in 12/2016 dtr brought pt to gynecologist for foul-smelling, dark urine, ucx grew pan-sensitive E. coli (>[id number]), tx w/10d course of macrobid w/resolution of sx. Dtr denies dark, foul-smelling urine, but states urine is "more yellow" than usual. Dtr also notes pt has remote hx (>20y ago) of large "calcium" renal stones x3, requiring surgical removal.  In ED febrile to 102.3, tachycardic to 112, normotensive, satting well on RA. CBC w/leukocytosis, normocytic anemia (11.1 down from baseline ~13 on outpt labs), thrombocytosis. BMP w/no renal dysfunction, no major electrolyte abn, hyperglycemia to 258, HAGMA (HCO3 20, AG 23). LFTs WNL. VBG w/low PCO2 (38), lactate 2.6, down to 1.3 on repeat. UA grossly positive w/bacteriuria, pyuria, large LE, positive nitrite. Also noted moderate hematuria. RVP neg. CXR w/grossly clear lungs. Pt given ceftriaxone, 2LNS bolus, Tylenol 650 Pox1, versed 2mg IVP x1 for agitation, and admitted to medicine.

## 2017-02-14 LAB
BASOPHILS # BLD AUTO: 0.03 K/UL — SIGNIFICANT CHANGE UP (ref 0–0.2)
BASOPHILS NFR BLD AUTO: 0.3 % — SIGNIFICANT CHANGE UP (ref 0–2)
BUN SERPL-MCNC: 8 MG/DL — SIGNIFICANT CHANGE UP (ref 7–23)
CALCIUM SERPL-MCNC: 9.3 MG/DL — SIGNIFICANT CHANGE UP (ref 8.4–10.5)
CHLORIDE SERPL-SCNC: 99 MMOL/L — SIGNIFICANT CHANGE UP (ref 98–107)
CO2 SERPL-SCNC: 25 MMOL/L — SIGNIFICANT CHANGE UP (ref 22–31)
CREAT SERPL-MCNC: 0.73 MG/DL — SIGNIFICANT CHANGE UP (ref 0.5–1.3)
EOSINOPHIL # BLD AUTO: 0.16 K/UL — SIGNIFICANT CHANGE UP (ref 0–0.5)
EOSINOPHIL NFR BLD AUTO: 1.5 % — SIGNIFICANT CHANGE UP (ref 0–6)
GLUCOSE SERPL-MCNC: 238 MG/DL — HIGH (ref 70–99)
HCT VFR BLD CALC: 31.9 % — LOW (ref 34.5–45)
HGB BLD-MCNC: 10.4 G/DL — LOW (ref 11.5–15.5)
IMM GRANULOCYTES NFR BLD AUTO: 0.2 % — SIGNIFICANT CHANGE UP (ref 0–1.5)
LYMPHOCYTES # BLD AUTO: 2.78 K/UL — SIGNIFICANT CHANGE UP (ref 1–3.3)
LYMPHOCYTES # BLD AUTO: 26.4 % — SIGNIFICANT CHANGE UP (ref 13–44)
MAGNESIUM SERPL-MCNC: 1.6 MG/DL — SIGNIFICANT CHANGE UP (ref 1.6–2.6)
MCHC RBC-ENTMCNC: 30.5 PG — SIGNIFICANT CHANGE UP (ref 27–34)
MCHC RBC-ENTMCNC: 32.6 % — SIGNIFICANT CHANGE UP (ref 32–36)
MCV RBC AUTO: 93.5 FL — SIGNIFICANT CHANGE UP (ref 80–100)
MONOCYTES # BLD AUTO: 0.66 K/UL — SIGNIFICANT CHANGE UP (ref 0–0.9)
MONOCYTES NFR BLD AUTO: 6.3 % — SIGNIFICANT CHANGE UP (ref 2–14)
NEUTROPHILS # BLD AUTO: 6.9 K/UL — SIGNIFICANT CHANGE UP (ref 1.8–7.4)
NEUTROPHILS NFR BLD AUTO: 65.3 % — SIGNIFICANT CHANGE UP (ref 43–77)
PHOSPHATE SERPL-MCNC: 2.2 MG/DL — LOW (ref 2.5–4.5)
PLATELET # BLD AUTO: 631 K/UL — HIGH (ref 150–400)
PMV BLD: 10.2 FL — SIGNIFICANT CHANGE UP (ref 7–13)
POTASSIUM SERPL-MCNC: 3.6 MMOL/L — SIGNIFICANT CHANGE UP (ref 3.5–5.3)
POTASSIUM SERPL-SCNC: 3.6 MMOL/L — SIGNIFICANT CHANGE UP (ref 3.5–5.3)
RBC # BLD: 3.41 M/UL — LOW (ref 3.8–5.2)
RBC # FLD: 13.1 % — SIGNIFICANT CHANGE UP (ref 10.3–14.5)
SODIUM SERPL-SCNC: 137 MMOL/L — SIGNIFICANT CHANGE UP (ref 135–145)
SPECIMEN SOURCE: SIGNIFICANT CHANGE UP
WBC # BLD: 10.55 K/UL — HIGH (ref 3.8–10.5)
WBC # FLD AUTO: 10.55 K/UL — HIGH (ref 3.8–10.5)

## 2017-02-14 PROCEDURE — 74177 CT ABD & PELVIS W/CONTRAST: CPT | Mod: 26

## 2017-02-14 PROCEDURE — 99233 SBSQ HOSP IP/OBS HIGH 50: CPT | Mod: GC

## 2017-02-14 RX ORDER — HALOPERIDOL DECANOATE 100 MG/ML
2 INJECTION INTRAMUSCULAR ONCE
Qty: 0 | Refills: 0 | Status: DISCONTINUED | OUTPATIENT
Start: 2017-02-14 | End: 2017-02-16

## 2017-02-14 RX ADMIN — Medication 2: at 12:28

## 2017-02-14 RX ADMIN — Medication 1: at 09:18

## 2017-02-14 RX ADMIN — Medication 1 TABLET(S): at 13:01

## 2017-02-14 RX ADMIN — CEFTRIAXONE 100 GRAM(S): 500 INJECTION, POWDER, FOR SOLUTION INTRAMUSCULAR; INTRAVENOUS at 02:30

## 2017-02-14 RX ADMIN — ENOXAPARIN SODIUM 40 MILLIGRAM(S): 100 INJECTION SUBCUTANEOUS at 11:19

## 2017-02-14 RX ADMIN — Medication 81 MILLIGRAM(S): at 11:19

## 2017-02-14 RX ADMIN — CEFTRIAXONE 100 GRAM(S): 500 INJECTION, POWDER, FOR SOLUTION INTRAMUSCULAR; INTRAVENOUS at 21:42

## 2017-02-14 RX ADMIN — Medication 1 TABLET(S): at 21:38

## 2017-02-14 RX ADMIN — Medication 0.5 MILLIGRAM(S): at 02:09

## 2017-02-14 RX ADMIN — Medication 100 MICROGRAM(S): at 06:05

## 2017-02-14 NOTE — PHYSICAL THERAPY INITIAL EVALUATION ADULT - PERTINENT HX OF CURRENT PROBLEM, REHAB EVAL
80F w/PMHx HTN, T2DM (NIDDM), hypothyroidism, osteoporosis, Alzheimer’s dementia (AAOx1 @baseline) who p/w generalized weakness, confusion, and agitation x3d and fever x1d. Pt brought in by family, agitated, refusing to answer questions, hx obtained primarily by family at bedside and from EMR/chart review.

## 2017-02-15 ENCOUNTER — APPOINTMENT (OUTPATIENT)
Dept: INTERNAL MEDICINE | Facility: CLINIC | Age: 82
End: 2017-02-15

## 2017-02-15 LAB
-  AMIKACIN: SIGNIFICANT CHANGE UP
-  AMPICILLIN/SULBACTAM: SIGNIFICANT CHANGE UP
-  AMPICILLIN: SIGNIFICANT CHANGE UP
-  AZTREONAM: SIGNIFICANT CHANGE UP
-  CEFAZOLIN: SIGNIFICANT CHANGE UP
-  CEFEPIME: SIGNIFICANT CHANGE UP
-  CEFOXITIN: SIGNIFICANT CHANGE UP
-  CEFTAZIDIME: SIGNIFICANT CHANGE UP
-  CEFTRIAXONE: SIGNIFICANT CHANGE UP
-  CIPROFLOXACIN: SIGNIFICANT CHANGE UP
-  ERTAPENEM: SIGNIFICANT CHANGE UP
-  GENTAMICIN: SIGNIFICANT CHANGE UP
-  IMIPENEM: SIGNIFICANT CHANGE UP
-  LEVOFLOXACIN: SIGNIFICANT CHANGE UP
-  MEROPENEM: SIGNIFICANT CHANGE UP
-  NITROFURANTOIN: SIGNIFICANT CHANGE UP
-  PIPERACILLIN/TAZOBACTAM: SIGNIFICANT CHANGE UP
-  TIGECYCLINE: SIGNIFICANT CHANGE UP
-  TOBRAMYCIN: SIGNIFICANT CHANGE UP
-  TRIMETHOPRIM/SULFAMETHOXAZOLE: SIGNIFICANT CHANGE UP
BACTERIA UR CULT: SIGNIFICANT CHANGE UP
BUN SERPL-MCNC: 8 MG/DL — SIGNIFICANT CHANGE UP (ref 7–23)
CALCIUM SERPL-MCNC: 9.7 MG/DL — SIGNIFICANT CHANGE UP (ref 8.4–10.5)
CHLORIDE SERPL-SCNC: 100 MMOL/L — SIGNIFICANT CHANGE UP (ref 98–107)
CO2 SERPL-SCNC: 24 MMOL/L — SIGNIFICANT CHANGE UP (ref 22–31)
CREAT SERPL-MCNC: 0.73 MG/DL — SIGNIFICANT CHANGE UP (ref 0.5–1.3)
GLUCOSE SERPL-MCNC: 208 MG/DL — HIGH (ref 70–99)
HCT VFR BLD CALC: 33.1 % — LOW (ref 34.5–45)
HGB BLD-MCNC: 11 G/DL — LOW (ref 11.5–15.5)
MAGNESIUM SERPL-MCNC: 1.7 MG/DL — SIGNIFICANT CHANGE UP (ref 1.6–2.6)
MCHC RBC-ENTMCNC: 31 PG — SIGNIFICANT CHANGE UP (ref 27–34)
MCHC RBC-ENTMCNC: 33.2 % — SIGNIFICANT CHANGE UP (ref 32–36)
MCV RBC AUTO: 93.2 FL — SIGNIFICANT CHANGE UP (ref 80–100)
METHOD TYPE: SIGNIFICANT CHANGE UP
ORGANISM # SPEC MICROSCOPIC CNT: SIGNIFICANT CHANGE UP
ORGANISM # SPEC MICROSCOPIC CNT: SIGNIFICANT CHANGE UP
PHOSPHATE SERPL-MCNC: 2.9 MG/DL — SIGNIFICANT CHANGE UP (ref 2.5–4.5)
PLATELET # BLD AUTO: 659 K/UL — HIGH (ref 150–400)
PMV BLD: 10.3 FL — SIGNIFICANT CHANGE UP (ref 7–13)
POTASSIUM SERPL-MCNC: 3.7 MMOL/L — SIGNIFICANT CHANGE UP (ref 3.5–5.3)
POTASSIUM SERPL-SCNC: 3.7 MMOL/L — SIGNIFICANT CHANGE UP (ref 3.5–5.3)
RBC # BLD: 3.55 M/UL — LOW (ref 3.8–5.2)
RBC # FLD: 13 % — SIGNIFICANT CHANGE UP (ref 10.3–14.5)
SODIUM SERPL-SCNC: 139 MMOL/L — SIGNIFICANT CHANGE UP (ref 135–145)
WBC # BLD: 9.98 K/UL — SIGNIFICANT CHANGE UP (ref 3.8–10.5)
WBC # FLD AUTO: 9.98 K/UL — SIGNIFICANT CHANGE UP (ref 3.8–10.5)

## 2017-02-15 PROCEDURE — 78708 K FLOW/FUNCT IMAGE W/DRUG: CPT | Mod: 26

## 2017-02-15 PROCEDURE — 99232 SBSQ HOSP IP/OBS MODERATE 35: CPT | Mod: GC

## 2017-02-15 RX ORDER — HALOPERIDOL DECANOATE 100 MG/ML
1 INJECTION INTRAMUSCULAR ONCE
Qty: 0 | Refills: 0 | Status: COMPLETED | OUTPATIENT
Start: 2017-02-15 | End: 2017-02-15

## 2017-02-15 RX ADMIN — CEFTRIAXONE 100 GRAM(S): 500 INJECTION, POWDER, FOR SOLUTION INTRAMUSCULAR; INTRAVENOUS at 21:16

## 2017-02-15 RX ADMIN — Medication 1 TABLET(S): at 16:22

## 2017-02-15 RX ADMIN — Medication 1: at 22:13

## 2017-02-15 RX ADMIN — Medication 2: at 09:01

## 2017-02-15 RX ADMIN — HALOPERIDOL DECANOATE 1 MILLIGRAM(S): 100 INJECTION INTRAMUSCULAR at 09:05

## 2017-02-15 RX ADMIN — Medication 1 TABLET(S): at 07:00

## 2017-02-15 RX ADMIN — Medication 100 MICROGRAM(S): at 07:00

## 2017-02-15 RX ADMIN — Medication 1 TABLET(S): at 21:15

## 2017-02-16 VITALS
RESPIRATION RATE: 18 BRPM | OXYGEN SATURATION: 95 % | SYSTOLIC BLOOD PRESSURE: 129 MMHG | TEMPERATURE: 98 F | DIASTOLIC BLOOD PRESSURE: 81 MMHG | HEART RATE: 82 BPM

## 2017-02-16 LAB
BUN SERPL-MCNC: 9 MG/DL — SIGNIFICANT CHANGE UP (ref 7–23)
CALCIUM SERPL-MCNC: 9.6 MG/DL — SIGNIFICANT CHANGE UP (ref 8.4–10.5)
CHLORIDE SERPL-SCNC: 99 MMOL/L — SIGNIFICANT CHANGE UP (ref 98–107)
CO2 SERPL-SCNC: 28 MMOL/L — SIGNIFICANT CHANGE UP (ref 22–31)
CREAT SERPL-MCNC: 0.73 MG/DL — SIGNIFICANT CHANGE UP (ref 0.5–1.3)
GLUCOSE SERPL-MCNC: 224 MG/DL — HIGH (ref 70–99)
HCT VFR BLD CALC: 33.6 % — LOW (ref 34.5–45)
HGB BLD-MCNC: 11 G/DL — LOW (ref 11.5–15.5)
MAGNESIUM SERPL-MCNC: 1.8 MG/DL — SIGNIFICANT CHANGE UP (ref 1.6–2.6)
MCHC RBC-ENTMCNC: 30.7 PG — SIGNIFICANT CHANGE UP (ref 27–34)
MCHC RBC-ENTMCNC: 32.7 % — SIGNIFICANT CHANGE UP (ref 32–36)
MCV RBC AUTO: 93.9 FL — SIGNIFICANT CHANGE UP (ref 80–100)
PHOSPHATE SERPL-MCNC: 3.2 MG/DL — SIGNIFICANT CHANGE UP (ref 2.5–4.5)
PLATELET # BLD AUTO: 668 K/UL — HIGH (ref 150–400)
PMV BLD: 10.3 FL — SIGNIFICANT CHANGE UP (ref 7–13)
POTASSIUM SERPL-MCNC: 3.6 MMOL/L — SIGNIFICANT CHANGE UP (ref 3.5–5.3)
POTASSIUM SERPL-SCNC: 3.6 MMOL/L — SIGNIFICANT CHANGE UP (ref 3.5–5.3)
RBC # BLD: 3.58 M/UL — LOW (ref 3.8–5.2)
RBC # FLD: 13 % — SIGNIFICANT CHANGE UP (ref 10.3–14.5)
SODIUM SERPL-SCNC: 141 MMOL/L — SIGNIFICANT CHANGE UP (ref 135–145)
WBC # BLD: 11.05 K/UL — HIGH (ref 3.8–10.5)
WBC # FLD AUTO: 11.05 K/UL — HIGH (ref 3.8–10.5)

## 2017-02-16 PROCEDURE — 99239 HOSP IP/OBS DSCHRG MGMT >30: CPT

## 2017-02-16 RX ORDER — CEPHALEXIN 500 MG
1 CAPSULE ORAL
Qty: 6 | Refills: 0 | OUTPATIENT
Start: 2017-02-16 | End: 2017-02-19

## 2017-02-16 RX ORDER — CEPHALEXIN 500 MG
1 CAPSULE ORAL
Qty: 22 | Refills: 0 | OUTPATIENT
Start: 2017-02-16 | End: 2017-02-27

## 2017-02-16 RX ADMIN — Medication 81 MILLIGRAM(S): at 12:41

## 2017-02-16 RX ADMIN — Medication 2: at 12:42

## 2017-02-17 ENCOUNTER — APPOINTMENT (OUTPATIENT)
Dept: UROLOGY | Facility: CLINIC | Age: 82
End: 2017-02-17

## 2017-02-17 VITALS
BODY MASS INDEX: 33.26 KG/M2 | WEIGHT: 165 LBS | RESPIRATION RATE: 17 BRPM | TEMPERATURE: 97 F | DIASTOLIC BLOOD PRESSURE: 90 MMHG | HEIGHT: 59 IN | SYSTOLIC BLOOD PRESSURE: 139 MMHG | HEART RATE: 85 BPM

## 2017-02-17 DIAGNOSIS — N20.0 CALCULUS OF KIDNEY: ICD-10-CM

## 2017-02-17 DIAGNOSIS — N28.9 DISORDER OF KIDNEY AND URETER, UNSPECIFIED: ICD-10-CM

## 2017-02-17 PROBLEM — Z00.00 ENCOUNTER FOR PREVENTIVE HEALTH EXAMINATION: Status: ACTIVE | Noted: 2017-02-17

## 2017-02-17 LAB
BACTERIA BLD CULT: SIGNIFICANT CHANGE UP
BACTERIA BLD CULT: SIGNIFICANT CHANGE UP

## 2017-02-21 ENCOUNTER — MEDICATION RENEWAL (OUTPATIENT)
Age: 82
End: 2017-02-21

## 2017-02-22 ENCOUNTER — MEDICATION RENEWAL (OUTPATIENT)
Age: 82
End: 2017-02-22

## 2017-02-27 ENCOUNTER — APPOINTMENT (OUTPATIENT)
Dept: INTERNAL MEDICINE | Facility: CLINIC | Age: 82
End: 2017-02-27

## 2017-03-03 ENCOUNTER — INPATIENT (INPATIENT)
Facility: HOSPITAL | Age: 82
LOS: 4 days | Discharge: ANOTHER HOSPITAL | DRG: 299 | End: 2017-03-08
Attending: INTERNAL MEDICINE | Admitting: INTERNAL MEDICINE
Payer: MEDICAID

## 2017-03-03 ENCOUNTER — APPOINTMENT (OUTPATIENT)
Dept: INTERNAL MEDICINE | Facility: CLINIC | Age: 82
End: 2017-03-03

## 2017-03-03 ENCOUNTER — APPOINTMENT (OUTPATIENT)
Dept: UROLOGY | Facility: CLINIC | Age: 82
End: 2017-03-03

## 2017-03-03 VITALS — HEIGHT: 59 IN | BODY MASS INDEX: 31.65 KG/M2 | WEIGHT: 157 LBS

## 2017-03-03 VITALS
DIASTOLIC BLOOD PRESSURE: 51 MMHG | HEART RATE: 108 BPM | SYSTOLIC BLOOD PRESSURE: 110 MMHG | TEMPERATURE: 98 F | RESPIRATION RATE: 20 BRPM | OXYGEN SATURATION: 95 %

## 2017-03-03 DIAGNOSIS — G30.8 OTHER ALZHEIMER'S DISEASE: ICD-10-CM

## 2017-03-03 DIAGNOSIS — Z87.442 PERSONAL HISTORY OF URINARY CALCULI: Chronic | ICD-10-CM

## 2017-03-03 DIAGNOSIS — I82.90 ACUTE EMBOLISM AND THROMBOSIS OF UNSPECIFIED VEIN: ICD-10-CM

## 2017-03-03 DIAGNOSIS — K80.20 CALCULUS OF GALLBLADDER WITHOUT CHOLECYSTITIS WITHOUT OBSTRUCTION: Chronic | ICD-10-CM

## 2017-03-03 DIAGNOSIS — M79.89 OTHER SPECIFIED SOFT TISSUE DISORDERS: ICD-10-CM

## 2017-03-03 DIAGNOSIS — I10 ESSENTIAL (PRIMARY) HYPERTENSION: ICD-10-CM

## 2017-03-03 DIAGNOSIS — E11.9 TYPE 2 DIABETES MELLITUS WITHOUT COMPLICATIONS: ICD-10-CM

## 2017-03-03 DIAGNOSIS — L02.91 CUTANEOUS ABSCESS, UNSPECIFIED: ICD-10-CM

## 2017-03-03 DIAGNOSIS — Z90.49 ACQUIRED ABSENCE OF OTHER SPECIFIED PARTS OF DIGESTIVE TRACT: Chronic | ICD-10-CM

## 2017-03-03 DIAGNOSIS — I82.402 ACUTE EMBOLISM AND THROMBOSIS OF UNSPECIFIED DEEP VEINS OF LEFT LOWER EXTREMITY: ICD-10-CM

## 2017-03-03 DIAGNOSIS — N28.89 OTHER SPECIFIED DISORDERS OF KIDNEY AND URETER: ICD-10-CM

## 2017-03-03 DIAGNOSIS — N20.0 CALCULUS OF KIDNEY: ICD-10-CM

## 2017-03-03 DIAGNOSIS — N39.0 URINARY TRACT INFECTION, SITE NOT SPECIFIED: ICD-10-CM

## 2017-03-03 LAB
ALBUMIN SERPL ELPH-MCNC: 3.4 G/DL — SIGNIFICANT CHANGE UP (ref 3.3–5)
ALP SERPL-CCNC: 106 U/L — SIGNIFICANT CHANGE UP (ref 40–120)
ALT FLD-CCNC: 27 U/L RC — SIGNIFICANT CHANGE UP (ref 10–45)
ANION GAP SERPL CALC-SCNC: 18 MMOL/L — HIGH (ref 5–17)
APPEARANCE UR: ABNORMAL
APTT BLD: 31.4 SEC — SIGNIFICANT CHANGE UP (ref 27.5–37.4)
AST SERPL-CCNC: 26 U/L — SIGNIFICANT CHANGE UP (ref 10–40)
BACTERIA # UR AUTO: ABNORMAL /HPF
BASE EXCESS BLDV CALC-SCNC: 1.5 MMOL/L — SIGNIFICANT CHANGE UP (ref -2–2)
BASOPHILS # BLD AUTO: 0.1 K/UL — SIGNIFICANT CHANGE UP (ref 0–0.2)
BASOPHILS NFR BLD AUTO: 0.5 % — SIGNIFICANT CHANGE UP (ref 0–2)
BILIRUB SERPL-MCNC: 0.3 MG/DL — SIGNIFICANT CHANGE UP (ref 0.2–1.2)
BILIRUB UR-MCNC: NEGATIVE — SIGNIFICANT CHANGE UP
BUN SERPL-MCNC: 25 MG/DL — HIGH (ref 7–23)
CA-I SERPL-SCNC: 1.32 MMOL/L — HIGH (ref 1.12–1.3)
CALCIUM SERPL-MCNC: 10.7 MG/DL — HIGH (ref 8.4–10.5)
CHLORIDE BLDV-SCNC: 102 MMOL/L — SIGNIFICANT CHANGE UP (ref 96–108)
CHLORIDE SERPL-SCNC: 97 MMOL/L — SIGNIFICANT CHANGE UP (ref 96–108)
CO2 BLDV-SCNC: 27 MMOL/L — SIGNIFICANT CHANGE UP (ref 22–30)
CO2 SERPL-SCNC: 23 MMOL/L — SIGNIFICANT CHANGE UP (ref 22–31)
COLOR SPEC: YELLOW — SIGNIFICANT CHANGE UP
CREAT SERPL-MCNC: 0.97 MG/DL — SIGNIFICANT CHANGE UP (ref 0.5–1.3)
DIFF PNL FLD: ABNORMAL
EOSINOPHIL # BLD AUTO: 0.2 K/UL — SIGNIFICANT CHANGE UP (ref 0–0.5)
EOSINOPHIL NFR BLD AUTO: 1.2 % — SIGNIFICANT CHANGE UP (ref 0–6)
EPI CELLS # UR: SIGNIFICANT CHANGE UP /HPF
GAS PNL BLDV: 133 MMOL/L — LOW (ref 136–145)
GAS PNL BLDV: SIGNIFICANT CHANGE UP
GAS PNL BLDV: SIGNIFICANT CHANGE UP
GLUCOSE BLDV-MCNC: 151 MG/DL — HIGH (ref 70–99)
GLUCOSE SERPL-MCNC: 154 MG/DL — HIGH (ref 70–99)
GLUCOSE UR QL: NEGATIVE — SIGNIFICANT CHANGE UP
HCO3 BLDV-SCNC: 26 MMOL/L — SIGNIFICANT CHANGE UP (ref 21–29)
HCT VFR BLD CALC: 35.8 % — SIGNIFICANT CHANGE UP (ref 34.5–45)
HCT VFR BLDA CALC: 32 % — LOW (ref 39–50)
HGB BLD CALC-MCNC: 10.2 G/DL — LOW (ref 11.5–15.5)
HGB BLD-MCNC: 11.8 G/DL — SIGNIFICANT CHANGE UP (ref 11.5–15.5)
INR BLD: 1.17 RATIO — HIGH (ref 0.88–1.16)
KETONES UR-MCNC: ABNORMAL
LACTATE BLDV-MCNC: 0.9 MMOL/L — SIGNIFICANT CHANGE UP (ref 0.7–2)
LEUKOCYTE ESTERASE UR-ACNC: ABNORMAL
LYMPHOCYTES # BLD AUTO: 32.2 % — SIGNIFICANT CHANGE UP (ref 13–44)
LYMPHOCYTES # BLD AUTO: 4.9 K/UL — HIGH (ref 1–3.3)
MCHC RBC-ENTMCNC: 30.9 PG — SIGNIFICANT CHANGE UP (ref 27–34)
MCHC RBC-ENTMCNC: 33 GM/DL — SIGNIFICANT CHANGE UP (ref 32–36)
MCV RBC AUTO: 93.5 FL — SIGNIFICANT CHANGE UP (ref 80–100)
MONOCYTES # BLD AUTO: 1.1 K/UL — HIGH (ref 0–0.9)
MONOCYTES NFR BLD AUTO: 7 % — SIGNIFICANT CHANGE UP (ref 2–14)
NEUTROPHILS # BLD AUTO: 9.1 K/UL — HIGH (ref 1.8–7.4)
NEUTROPHILS NFR BLD AUTO: 59.2 % — SIGNIFICANT CHANGE UP (ref 43–77)
NITRITE UR-MCNC: NEGATIVE — SIGNIFICANT CHANGE UP
PCO2 BLDV: 40 MMHG — SIGNIFICANT CHANGE UP (ref 35–50)
PH BLDV: 7.42 — SIGNIFICANT CHANGE UP (ref 7.35–7.45)
PH UR: 6 — SIGNIFICANT CHANGE UP (ref 4.8–8)
PLATELET # BLD AUTO: 598 K/UL — HIGH (ref 150–400)
PO2 BLDV: 99 MMHG — HIGH (ref 25–45)
POTASSIUM BLDV-SCNC: 3.8 MMOL/L — SIGNIFICANT CHANGE UP (ref 3.5–5)
POTASSIUM SERPL-MCNC: 5.2 MMOL/L — SIGNIFICANT CHANGE UP (ref 3.5–5.3)
POTASSIUM SERPL-SCNC: 5.2 MMOL/L — SIGNIFICANT CHANGE UP (ref 3.5–5.3)
PROT SERPL-MCNC: 8.9 G/DL — HIGH (ref 6–8.3)
PROT UR-MCNC: 100 MG/DL
PROTHROM AB SERPL-ACNC: 12.7 SEC — SIGNIFICANT CHANGE UP (ref 10–13.1)
RBC # BLD: 3.83 M/UL — SIGNIFICANT CHANGE UP (ref 3.8–5.2)
RBC # FLD: 12.2 % — SIGNIFICANT CHANGE UP (ref 10.3–14.5)
RBC CASTS # UR COMP ASSIST: ABNORMAL /HPF (ref 0–2)
SAO2 % BLDV: 98 % — HIGH (ref 67–88)
SODIUM SERPL-SCNC: 138 MMOL/L — SIGNIFICANT CHANGE UP (ref 135–145)
SP GR SPEC: 1.02 — SIGNIFICANT CHANGE UP (ref 1.01–1.02)
UROBILINOGEN FLD QL: NEGATIVE — SIGNIFICANT CHANGE UP
WBC # BLD: 15.3 K/UL — HIGH (ref 3.8–10.5)
WBC # FLD AUTO: 15.3 K/UL — HIGH (ref 3.8–10.5)
WBC UR QL: >50 /HPF (ref 0–5)

## 2017-03-03 PROCEDURE — 74177 CT ABD & PELVIS W/CONTRAST: CPT | Mod: 26

## 2017-03-03 PROCEDURE — 99223 1ST HOSP IP/OBS HIGH 75: CPT | Mod: AI

## 2017-03-03 PROCEDURE — 93971 EXTREMITY STUDY: CPT | Mod: 26

## 2017-03-03 PROCEDURE — 99497 ADVNCD CARE PLAN 30 MIN: CPT

## 2017-03-03 PROCEDURE — 93010 ELECTROCARDIOGRAM REPORT: CPT

## 2017-03-03 PROCEDURE — 99285 EMERGENCY DEPT VISIT HI MDM: CPT | Mod: 25

## 2017-03-03 RX ORDER — VANCOMYCIN HCL 1 G
1000 VIAL (EA) INTRAVENOUS ONCE
Qty: 0 | Refills: 0 | Status: COMPLETED | OUTPATIENT
Start: 2017-03-03 | End: 2017-03-03

## 2017-03-03 RX ORDER — HALOPERIDOL DECANOATE 100 MG/ML
5 INJECTION INTRAMUSCULAR ONCE
Qty: 0 | Refills: 0 | Status: COMPLETED | OUTPATIENT
Start: 2017-03-03 | End: 2017-03-03

## 2017-03-03 RX ORDER — MIDAZOLAM HYDROCHLORIDE 1 MG/ML
2 INJECTION, SOLUTION INTRAMUSCULAR; INTRAVENOUS ONCE
Qty: 0 | Refills: 0 | Status: DISCONTINUED | OUTPATIENT
Start: 2017-03-03 | End: 2017-03-03

## 2017-03-03 RX ORDER — CEFEPIME 1 G/1
1000 INJECTION, POWDER, FOR SOLUTION INTRAMUSCULAR; INTRAVENOUS ONCE
Qty: 0 | Refills: 0 | Status: COMPLETED | OUTPATIENT
Start: 2017-03-03 | End: 2017-03-03

## 2017-03-03 RX ORDER — SODIUM CHLORIDE 9 MG/ML
1000 INJECTION INTRAMUSCULAR; INTRAVENOUS; SUBCUTANEOUS
Qty: 0 | Refills: 0 | Status: DISCONTINUED | OUTPATIENT
Start: 2017-03-03 | End: 2017-03-06

## 2017-03-03 RX ADMIN — SODIUM CHLORIDE 150 MILLILITER(S): 9 INJECTION INTRAMUSCULAR; INTRAVENOUS; SUBCUTANEOUS at 21:02

## 2017-03-03 RX ADMIN — Medication 250 MILLIGRAM(S): at 21:58

## 2017-03-03 RX ADMIN — Medication 2 MILLIGRAM(S): at 18:38

## 2017-03-03 RX ADMIN — CEFEPIME 100 MILLIGRAM(S): 1 INJECTION, POWDER, FOR SOLUTION INTRAMUSCULAR; INTRAVENOUS at 21:02

## 2017-03-03 RX ADMIN — MIDAZOLAM HYDROCHLORIDE 2 MILLIGRAM(S): 1 INJECTION, SOLUTION INTRAMUSCULAR; INTRAVENOUS at 17:21

## 2017-03-03 RX ADMIN — HALOPERIDOL DECANOATE 5 MILLIGRAM(S): 100 INJECTION INTRAMUSCULAR at 17:20

## 2017-03-03 NOTE — ED PROVIDER NOTE - NS ED MD SCRIBE ATTENDING SCRIBE SECTIONS
DISPOSITION/HISTORY OF PRESENT ILLNESS/PHYSICAL EXAM/PAST MEDICAL/SURGICAL/SOCIAL HISTORY/HIV/VITAL SIGNS( Pullset)/REVIEW OF SYSTEMS

## 2017-03-03 NOTE — H&P ADULT. - RADIOLOGY RESULTS AND INTERPRETATION
CT A/P - follow up report  Duplex LE - CT A/P reviewed- Unchanged severe right-sided hydronephrosis with delayed right nephrogram. Unchanged ill-defined masslike density in the right renal pelvis likely representing an inflammatory mass as demonstrated on renal mass protocol CT from 2/14/2017. Improving right hydroureter with persistent urothelial enhancement. Unchanged rim-enhancing collection in the right psoas musculature at the level of the ureteropelvic junction measuring 3.0 x 1.4 x 2.7 cm concerning for phlegmon/abscess. Reactive enlarged right periaortic lymph node, unchanged.  Duplex LE - distal DVT

## 2017-03-03 NOTE — ED PROVIDER NOTE - SECONDARY DIAGNOSIS.
Acute deep vein thrombosis (DVT) of left lower extremity, unspecified vein Alzheimer's dementia with behavioral disturbance, unspecified timing of dementia onset

## 2017-03-03 NOTE — ED PROVIDER NOTE - PROGRESS NOTE DETAILS
Dr. Pollock Note: s/o from Dr. Alba pending u/s.  Pt reassessed around 20:00, updated on +DVT LE, states that urologist wants pt admitted for possible IR procedure for renal abscess.  Spoke to urologist PA Dr. Pollock Note: family updated, spoke to rads resident for prelim reading.  Of note, patient's prior visit at [facility] under different name "Selma Garcia" [id number] for [facility].  CT similar.  Pt's PCP is Hipolito Tran, will admit to hospitalist.  IV antibx given, fluids.  Will HOLD anticoagulants given expected procedure and that DVT is still quite distal and more important to treat the pyelo with the expected procedure. Dr. Pollock Note: s/o from Dr. Alba pending u/s.  Pt reassessed around 20:00, updated on +DVT LE, states that urologist wants pt admitted for possible IR procedure for renal abscess.  Spoke to urologist PA, requesting repeat ct scan of abdomen and admission to medicine with expected plan of IV antibx and possible IR placement of nephrostomy tube +/- drainage of phlegmon.

## 2017-03-03 NOTE — H&P ADULT. - PROBLEM SELECTOR PLAN 2
+UA in ed. s/p vanc/zosyn. treated at [facility] for sepsis 2/2 UTI w/ 14d course keflex. +UA in ed. s/p vanc/zosyn. treated at [facility] for sepsis 2/2 UTI w/ 14d course keflex. active nidus for infection given obstructing renal mass.   -c/w cefepime, f/u ucx

## 2017-03-03 NOTE — ED ADULT NURSE NOTE - ED STAT RN HANDOFF DETAILS 2
TO MICHEL STEWART IN MW TO MICHEL RN IN , aware of patient medications and vital sign status and that patient is leaving while in handoff to go for vascular study,

## 2017-03-03 NOTE — H&P ADULT. - PROBLEM SELECTOR PROBLEM 5
Essential hypertension Alzheimer's dementia with behavioral disturbance, unspecified timing of dementia onset

## 2017-03-03 NOTE — ED PROVIDER NOTE - MEDICAL DECISION MAKING DETAILS
As patient has mass and possible cancer in kidney, DVT is possible. History of alzheimer's. Patient needs sedation for workup of DVT. will check labs, recheck urine, and arrange for ultrasound of right lower extremity.

## 2017-03-03 NOTE — H&P ADULT. - HISTORY OF PRESENT ILLNESS
81F hx dm2, htn, hypothyroid, alzheimers p/w R leg pain, swelling x2d.    ED VS: Tmax: 98, BP: 97/68, P: 85, R: 18, O2: 98% on RA  ED meds: vanc, cefepime 81F hx dm2, htn, hypothyroid, alzheimers p/w R leg pain, swelling x2d.    ED VS: Tmax: 98, BP: 97/68, P: 85, R: 18, O2: 98% on RA  ED meds: vanc, cefepime    Admission 2/13/17 for weakness, confusion, agitation. treated for sepsis 2/2 UTI. renal US showed R hydronephrosis w/ mass in R renal pelvis extending to R ureter. CT A/P showed R hydroureteronephrosis w/ obstruction at level of iliac vessels, heterogenous non-enhancing mass-like density at R renal pelvis may represent inflammatory mass, incidental phlegmon in R psoas muscle adjacent to renal pelvis detected. IR consulted, opted to defer drainage. urology consulted. pt had abnormal diuretic renal scan w/ decreased flow to and function of R kidney w/ evidence of obstruction. urology recommended outpt ureteroscopy. treated w/ 14d course keflex for UTI w/ pan-sensitive ecoli. Nighttime hospitalist, patient not previously known to me  Seen and examined on 3/3/17 at 1145pm    81F hx dm2, htn, hypothyroid, alzheimers, R renal mass p/w R leg swelling x3d. per the family the patient has had increasing R calf swelling over the past 3 days. due to advanced alzheimers dementia the patient has not been able to describe any pain in the area. however when she saw her pmd today she became very agitated when he touched her R leg. they notes that today she was overall more agitated than usual. they deny increase in urination, foul smelling urine, fever, chills, abd pain, diarrhea, nausea/vomiting, shortness of breath.    The patient was admitted to [facility] 2/13/17 for weakness, confusion, agitation. treated for sepsis 2/2 UTI. renal US showed R hydronephrosis w/ mass in R renal pelvis extending to R ureter. CT A/P showed R hydroureteronephrosis w/ obstruction at level of iliac vessels, heterogenous non-enhancing mass-like density at R renal pelvis may represent inflammatory mass, incidental phlegmon in R psoas muscle adjacent to renal pelvis detected. IR consulted, opted to defer drainage. urology consulted. pt had abnormal diuretic renal scan w/ decreased flow to and function of R kidney w/ evidence of obstruction. urology recommended outpt ureteroscopy. treated w/ 14d course keflex for UTI w/ pan-sensitive ecoli. she completed the course last week.     she followed up with her urologist with her family where they discussed potential options for the renal mass including placing a drain, removing the kidney and continuing treatment with antibiotics. the family wanted a second opinion regarding removal of the mass vs drain placement. per the family the patient is in bed or in a chair most of the time and does not walk around much. additionally they note the right calf swelling has decreased throughout the day.    ED VS: Tmax: 98, BP: 97/68, P: 85, R: 18, O2: 98% on RA  ED meds: vanc, cefepime, haldol, ativan, versed

## 2017-03-03 NOTE — ED ADULT NURSE NOTE - OBJECTIVE STATEMENT
patient has history of dementia,, here for R/O DVT. in LLE, patient is confused, not oriented, combative when touched or approached, with assistance of family, patient held safely for PIV placement, lab draw and sedation with MD bedside, Patient tolerated well, placed on cardiac monitor, MD able to eval patient after patient sedated, VS done, patient has 20G in RAC

## 2017-03-03 NOTE — ED PROVIDER NOTE - OBJECTIVE STATEMENT
81 year old female patient with pmhx of HTN, T2DM, hypothyroidism, Alzheimer's sent to the ED from [facility] with pain and swelling to the right leg x2 days. Patient was at [facility] 1.5 weeks ago for a UTI and was put antibiotics there. In the right kidney, they found a stone and a mass, which may be causing hydronephrosis. She has been more aggressive and agitated since being discharged from [facility].  Denies chest pain, shortness of breath, fever, urinary symptoms.

## 2017-03-03 NOTE — H&P ADULT. - PROBLEM SELECTOR PLAN 1
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 Likely scheduled for IR nephrostomy tube placement. would hold anticoagulation for now given upcoming procedure and distal nature of DVT. however would consider AC after procedure given likely malignancy and prolonged immobility. would repeat US in one week to evaluate status of thrombus. 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 Likely scheduled for IR nephrostomy tube placement. would hold anticoagulation for now given upcoming procedure and distal nature of DVT. recommend continued discussion re: AC after procedure given possible malignancy and prolonged immobility vs high bleeding risk. would repeat US in one week to evaluate status of thrombus.

## 2017-03-03 NOTE — H&P ADULT. - RS GEN PE MLT RESP DETAILS PC
clear to auscultation bilaterally/good air movement/airway patent/breath sounds equal/respirations non-labored

## 2017-03-03 NOTE — H&P ADULT. - PROBLEM SELECTOR PROBLEM 1
Leg swelling Acute deep vein thrombosis (DVT) of left lower extremity, unspecified vein Acute deep vein thrombosis (DVT) of distal vein of right lower extremity

## 2017-03-03 NOTE — H&P ADULT. - ASSESSMENT
81F hx dm2, htn, hypothyroid, alzheimers, R renal mass p/w R leg pain, swelling x3d found to have acute R calf DVT

## 2017-03-03 NOTE — ED PROVIDER NOTE - CARE PLAN
Principal Discharge DX:	Calculous pyelonephritis  Secondary Diagnosis:	Acute deep vein thrombosis (DVT) of left lower extremity, unspecified vein  Secondary Diagnosis:	Alzheimer's dementia with behavioral disturbance, unspecified timing of dementia onset

## 2017-03-03 NOTE — H&P ADULT. - PROBLEM SELECTOR PLAN 3
Noted on CT 2/14/17 w/ obstruction and R hydroureteronephrosis. IR consulted, may need nephrostomy tube. Noted on CT 2/14/17 w/ obstruction and R hydroureteronephrosis. Mass etiology inflammatory vs malignant. urology consulted, may need nephrostomy tube. family in continuing discussions re: removal of mass vs placement of drain. concerned due to dementia and risk for drain removal given patient frequent agitation. mass has not been biopsied to date. patient refused ureteroscopy in outpatient office.  -urology consult appreciated, f/u recs re: nephrostomy tube vs nephrectomy  -would consider oncology eval due to concern for possible TCC  -hold am dose dvt ppx for possible procedure

## 2017-03-03 NOTE — H&P ADULT. - PROBLEM SELECTOR PLAN 4
Psoas phlegmon noted on prior CT. IR consulted previously, has not been drained. currently afebrile. R psoas phlegmon incidentally noted on prior CT. IR consulted previously, has not been drained. currently afebrile.

## 2017-03-03 NOTE — H&P ADULT. - PROBLEM SELECTOR PLAN 9
I personally provided 20 minutes of advanced care planning. discussed specifics of dnr/dni and what patient and family would want. discussed quality of life of patient in setting of renal mass and potential need for removal. family would like to continue further discussions of code status and decision making as a family unit. full code.

## 2017-03-04 ENCOUNTER — MESSAGE (OUTPATIENT)
Age: 82
End: 2017-03-04

## 2017-03-04 DIAGNOSIS — I82.4Z1 ACUTE EMBOLISM AND THROMBOSIS OF UNSPECIFIED DEEP VEINS OF RIGHT DISTAL LOWER EXTREMITY: ICD-10-CM

## 2017-03-04 DIAGNOSIS — Z71.89 OTHER SPECIFIED COUNSELING: ICD-10-CM

## 2017-03-04 LAB
ANION GAP SERPL CALC-SCNC: 14 MMOL/L — SIGNIFICANT CHANGE UP (ref 5–17)
APTT BLD: 28.6 SEC — SIGNIFICANT CHANGE UP (ref 27.5–37.4)
BASOPHILS # BLD AUTO: 0.03 K/UL — SIGNIFICANT CHANGE UP (ref 0–0.2)
BASOPHILS NFR BLD AUTO: 0.3 % — SIGNIFICANT CHANGE UP (ref 0–2)
BUN SERPL-MCNC: 18 MG/DL — SIGNIFICANT CHANGE UP (ref 7–23)
CALCIUM SERPL-MCNC: 9.1 MG/DL — SIGNIFICANT CHANGE UP (ref 8.4–10.5)
CHLORIDE SERPL-SCNC: 102 MMOL/L — SIGNIFICANT CHANGE UP (ref 96–108)
CO2 SERPL-SCNC: 18 MMOL/L — LOW (ref 22–31)
CREAT SERPL-MCNC: 0.71 MG/DL — SIGNIFICANT CHANGE UP (ref 0.5–1.3)
EOSINOPHIL # BLD AUTO: 0.2 K/UL — SIGNIFICANT CHANGE UP (ref 0–0.5)
EOSINOPHIL NFR BLD AUTO: 2.1 % — SIGNIFICANT CHANGE UP (ref 0–6)
GLUCOSE SERPL-MCNC: 141 MG/DL — HIGH (ref 70–99)
HCT VFR BLD CALC: 28.8 % — LOW (ref 34.5–45)
HGB BLD-MCNC: 9.4 G/DL — LOW (ref 11.5–15.5)
IMM GRANULOCYTES NFR BLD AUTO: 0.3 % — SIGNIFICANT CHANGE UP (ref 0–1.5)
INR BLD: 1.12 RATIO — SIGNIFICANT CHANGE UP (ref 0.88–1.16)
LYMPHOCYTES # BLD AUTO: 2.45 K/UL — SIGNIFICANT CHANGE UP (ref 1–3.3)
LYMPHOCYTES # BLD AUTO: 25.2 % — SIGNIFICANT CHANGE UP (ref 13–44)
MCHC RBC-ENTMCNC: 30.2 PG — SIGNIFICANT CHANGE UP (ref 27–34)
MCHC RBC-ENTMCNC: 32.6 GM/DL — SIGNIFICANT CHANGE UP (ref 32–36)
MCV RBC AUTO: 92.6 FL — SIGNIFICANT CHANGE UP (ref 80–100)
MONOCYTES # BLD AUTO: 0.79 K/UL — SIGNIFICANT CHANGE UP (ref 0–0.9)
MONOCYTES NFR BLD AUTO: 8.1 % — SIGNIFICANT CHANGE UP (ref 2–14)
NEUTROPHILS # BLD AUTO: 6.21 K/UL — SIGNIFICANT CHANGE UP (ref 1.8–7.4)
NEUTROPHILS NFR BLD AUTO: 64 % — SIGNIFICANT CHANGE UP (ref 43–77)
PLATELET # BLD AUTO: 549 K/UL — HIGH (ref 150–400)
POTASSIUM SERPL-MCNC: 4.3 MMOL/L — SIGNIFICANT CHANGE UP (ref 3.5–5.3)
POTASSIUM SERPL-SCNC: 4.3 MMOL/L — SIGNIFICANT CHANGE UP (ref 3.5–5.3)
PROTHROM AB SERPL-ACNC: 12.7 SEC — SIGNIFICANT CHANGE UP (ref 10–13.1)
RBC # BLD: 3.11 M/UL — LOW (ref 3.8–5.2)
RBC # FLD: 13.3 % — SIGNIFICANT CHANGE UP (ref 10.3–14.5)
SODIUM SERPL-SCNC: 134 MMOL/L — LOW (ref 135–145)
WBC # BLD: 9.71 K/UL — SIGNIFICANT CHANGE UP (ref 3.8–10.5)
WBC # FLD AUTO: 9.71 K/UL — SIGNIFICANT CHANGE UP (ref 3.8–10.5)

## 2017-03-04 PROCEDURE — 99233 SBSQ HOSP IP/OBS HIGH 50: CPT

## 2017-03-04 RX ORDER — QUETIAPINE FUMARATE 200 MG/1
0 TABLET, FILM COATED ORAL
Qty: 0 | Refills: 0 | COMMUNITY

## 2017-03-04 RX ORDER — VALSARTAN 80 MG/1
0 TABLET ORAL
Qty: 0 | Refills: 0 | COMMUNITY

## 2017-03-04 RX ORDER — NITROFURANTOIN MACROCRYSTAL 50 MG
0 CAPSULE ORAL
Qty: 0 | Refills: 0 | COMMUNITY

## 2017-03-04 RX ORDER — GLUCAGON INJECTION, SOLUTION 0.5 MG/.1ML
1 INJECTION, SOLUTION SUBCUTANEOUS ONCE
Qty: 0 | Refills: 0 | Status: DISCONTINUED | OUTPATIENT
Start: 2017-03-04 | End: 2017-03-08

## 2017-03-04 RX ORDER — DONEPEZIL HYDROCHLORIDE 10 MG/1
5 TABLET, FILM COATED ORAL AT BEDTIME
Qty: 0 | Refills: 0 | Status: DISCONTINUED | OUTPATIENT
Start: 2017-03-04 | End: 2017-03-08

## 2017-03-04 RX ORDER — METFORMIN HYDROCHLORIDE 850 MG/1
0 TABLET ORAL
Qty: 0 | Refills: 0 | COMMUNITY

## 2017-03-04 RX ORDER — DEXTROSE 50 % IN WATER 50 %
12.5 SYRINGE (ML) INTRAVENOUS ONCE
Qty: 0 | Refills: 0 | Status: DISCONTINUED | OUTPATIENT
Start: 2017-03-04 | End: 2017-03-08

## 2017-03-04 RX ORDER — DONEPEZIL HYDROCHLORIDE 10 MG/1
0 TABLET, FILM COATED ORAL
Qty: 0 | Refills: 0 | COMMUNITY

## 2017-03-04 RX ORDER — INSULIN LISPRO 100/ML
VIAL (ML) SUBCUTANEOUS
Qty: 0 | Refills: 0 | Status: DISCONTINUED | OUTPATIENT
Start: 2017-03-04 | End: 2017-03-08

## 2017-03-04 RX ORDER — LEVOTHYROXINE SODIUM 125 MCG
0 TABLET ORAL
Qty: 0 | Refills: 0 | COMMUNITY

## 2017-03-04 RX ORDER — SODIUM CHLORIDE 9 MG/ML
1000 INJECTION, SOLUTION INTRAVENOUS
Qty: 0 | Refills: 0 | Status: DISCONTINUED | OUTPATIENT
Start: 2017-03-04 | End: 2017-03-08

## 2017-03-04 RX ORDER — POLYETHYLENE GLYCOL 3350 17 G/17G
17 POWDER, FOR SOLUTION ORAL DAILY
Qty: 0 | Refills: 0 | Status: DISCONTINUED | OUTPATIENT
Start: 2017-03-04 | End: 2017-03-08

## 2017-03-04 RX ORDER — DEXTROSE 50 % IN WATER 50 %
1 SYRINGE (ML) INTRAVENOUS ONCE
Qty: 0 | Refills: 0 | Status: DISCONTINUED | OUTPATIENT
Start: 2017-03-04 | End: 2017-03-08

## 2017-03-04 RX ORDER — HALOPERIDOL DECANOATE 100 MG/ML
0.5 INJECTION INTRAMUSCULAR ONCE
Qty: 0 | Refills: 0 | Status: COMPLETED | OUTPATIENT
Start: 2017-03-04 | End: 2017-03-05

## 2017-03-04 RX ORDER — LEVOTHYROXINE SODIUM 125 MCG
100 TABLET ORAL DAILY
Qty: 0 | Refills: 0 | Status: DISCONTINUED | OUTPATIENT
Start: 2017-03-04 | End: 2017-03-08

## 2017-03-04 RX ORDER — ACETAMINOPHEN 500 MG
650 TABLET ORAL EVERY 6 HOURS
Qty: 0 | Refills: 0 | Status: DISCONTINUED | OUTPATIENT
Start: 2017-03-04 | End: 2017-03-08

## 2017-03-04 RX ORDER — CEFEPIME 1 G/1
1000 INJECTION, POWDER, FOR SOLUTION INTRAMUSCULAR; INTRAVENOUS EVERY 12 HOURS
Qty: 0 | Refills: 0 | Status: DISCONTINUED | OUTPATIENT
Start: 2017-03-04 | End: 2017-03-08

## 2017-03-04 RX ORDER — HALOPERIDOL DECANOATE 100 MG/ML
0.5 INJECTION INTRAMUSCULAR EVERY 6 HOURS
Qty: 0 | Refills: 0 | Status: DISCONTINUED | OUTPATIENT
Start: 2017-03-04 | End: 2017-03-07

## 2017-03-04 RX ADMIN — CEFEPIME 100 MILLIGRAM(S): 1 INJECTION, POWDER, FOR SOLUTION INTRAMUSCULAR; INTRAVENOUS at 06:44

## 2017-03-04 RX ADMIN — SODIUM CHLORIDE 150 MILLILITER(S): 9 INJECTION INTRAMUSCULAR; INTRAVENOUS; SUBCUTANEOUS at 15:16

## 2017-03-04 RX ADMIN — HALOPERIDOL DECANOATE 0.5 MILLIGRAM(S): 100 INJECTION INTRAMUSCULAR at 19:48

## 2017-03-04 RX ADMIN — CEFEPIME 100 MILLIGRAM(S): 1 INJECTION, POWDER, FOR SOLUTION INTRAMUSCULAR; INTRAVENOUS at 18:13

## 2017-03-04 RX ADMIN — DONEPEZIL HYDROCHLORIDE 5 MILLIGRAM(S): 10 TABLET, FILM COATED ORAL at 21:42

## 2017-03-04 RX ADMIN — POLYETHYLENE GLYCOL 3350 17 GRAM(S): 17 POWDER, FOR SOLUTION ORAL at 18:13

## 2017-03-04 RX ADMIN — Medication 100 MICROGRAM(S): at 06:44

## 2017-03-05 LAB
ANION GAP SERPL CALC-SCNC: 14 MMOL/L — SIGNIFICANT CHANGE UP (ref 5–17)
BUN SERPL-MCNC: 8 MG/DL — SIGNIFICANT CHANGE UP (ref 7–23)
CALCIUM SERPL-MCNC: 8.8 MG/DL — SIGNIFICANT CHANGE UP (ref 8.4–10.5)
CHLORIDE SERPL-SCNC: 106 MMOL/L — SIGNIFICANT CHANGE UP (ref 96–108)
CO2 SERPL-SCNC: 18 MMOL/L — LOW (ref 22–31)
CREAT SERPL-MCNC: 0.68 MG/DL — SIGNIFICANT CHANGE UP (ref 0.5–1.3)
GLUCOSE SERPL-MCNC: 152 MG/DL — HIGH (ref 70–99)
HBA1C BLD-MCNC: 8.3 % — HIGH (ref 4–5.6)
HCT VFR BLD CALC: 30 % — LOW (ref 34.5–45)
HGB BLD-MCNC: 10 G/DL — LOW (ref 11.5–15.5)
MCHC RBC-ENTMCNC: 31 PG — SIGNIFICANT CHANGE UP (ref 27–34)
MCHC RBC-ENTMCNC: 33.3 GM/DL — SIGNIFICANT CHANGE UP (ref 32–36)
MCV RBC AUTO: 93.2 FL — SIGNIFICANT CHANGE UP (ref 80–100)
PLATELET # BLD AUTO: 498 K/UL — HIGH (ref 150–400)
POTASSIUM SERPL-MCNC: 4.5 MMOL/L — SIGNIFICANT CHANGE UP (ref 3.5–5.3)
POTASSIUM SERPL-SCNC: 4.5 MMOL/L — SIGNIFICANT CHANGE UP (ref 3.5–5.3)
RBC # BLD: 3.22 M/UL — LOW (ref 3.8–5.2)
RBC # FLD: 12.1 % — SIGNIFICANT CHANGE UP (ref 10.3–14.5)
SODIUM SERPL-SCNC: 138 MMOL/L — SIGNIFICANT CHANGE UP (ref 135–145)
WBC # BLD: 11.9 K/UL — HIGH (ref 3.8–10.5)
WBC # FLD AUTO: 11.9 K/UL — HIGH (ref 3.8–10.5)

## 2017-03-05 PROCEDURE — 93010 ELECTROCARDIOGRAM REPORT: CPT

## 2017-03-05 PROCEDURE — 99232 SBSQ HOSP IP/OBS MODERATE 35: CPT

## 2017-03-05 RX ORDER — HALOPERIDOL DECANOATE 100 MG/ML
1 INJECTION INTRAMUSCULAR ONCE
Qty: 0 | Refills: 0 | Status: COMPLETED | OUTPATIENT
Start: 2017-03-05 | End: 2017-03-05

## 2017-03-05 RX ADMIN — HALOPERIDOL DECANOATE 0.5 MILLIGRAM(S): 100 INJECTION INTRAMUSCULAR at 00:26

## 2017-03-05 RX ADMIN — POLYETHYLENE GLYCOL 3350 17 GRAM(S): 17 POWDER, FOR SOLUTION ORAL at 18:52

## 2017-03-05 RX ADMIN — Medication 3: at 09:11

## 2017-03-05 RX ADMIN — HALOPERIDOL DECANOATE 1 MILLIGRAM(S): 100 INJECTION INTRAMUSCULAR at 12:46

## 2017-03-05 RX ADMIN — CEFEPIME 100 MILLIGRAM(S): 1 INJECTION, POWDER, FOR SOLUTION INTRAMUSCULAR; INTRAVENOUS at 06:14

## 2017-03-05 RX ADMIN — SODIUM CHLORIDE 150 MILLILITER(S): 9 INJECTION INTRAMUSCULAR; INTRAVENOUS; SUBCUTANEOUS at 00:26

## 2017-03-05 RX ADMIN — Medication 100 MICROGRAM(S): at 06:14

## 2017-03-05 RX ADMIN — SODIUM CHLORIDE 150 MILLILITER(S): 9 INJECTION INTRAMUSCULAR; INTRAVENOUS; SUBCUTANEOUS at 11:04

## 2017-03-05 RX ADMIN — CEFEPIME 100 MILLIGRAM(S): 1 INJECTION, POWDER, FOR SOLUTION INTRAMUSCULAR; INTRAVENOUS at 18:59

## 2017-03-06 ENCOUNTER — RESULT REVIEW (OUTPATIENT)
Age: 82
End: 2017-03-06

## 2017-03-06 ENCOUNTER — TRANSCRIPTION ENCOUNTER (OUTPATIENT)
Age: 82
End: 2017-03-06

## 2017-03-06 LAB
ANION GAP SERPL CALC-SCNC: 16 MMOL/L — SIGNIFICANT CHANGE UP (ref 5–17)
BUN SERPL-MCNC: 5 MG/DL — LOW (ref 7–23)
CALCIUM SERPL-MCNC: 8.9 MG/DL — SIGNIFICANT CHANGE UP (ref 8.4–10.5)
CHLORIDE SERPL-SCNC: 104 MMOL/L — SIGNIFICANT CHANGE UP (ref 96–108)
CO2 SERPL-SCNC: 19 MMOL/L — LOW (ref 22–31)
CREAT SERPL-MCNC: 0.62 MG/DL — SIGNIFICANT CHANGE UP (ref 0.5–1.3)
GLUCOSE SERPL-MCNC: 152 MG/DL — HIGH (ref 70–99)
HCT VFR BLD CALC: 29.4 % — LOW (ref 34.5–45)
HGB BLD-MCNC: 9.5 G/DL — LOW (ref 11.5–15.5)
MCHC RBC-ENTMCNC: 29.5 PG — SIGNIFICANT CHANGE UP (ref 27–34)
MCHC RBC-ENTMCNC: 32.3 GM/DL — SIGNIFICANT CHANGE UP (ref 32–36)
MCV RBC AUTO: 91.3 FL — SIGNIFICANT CHANGE UP (ref 80–100)
PLATELET # BLD AUTO: 655 K/UL — HIGH (ref 150–400)
POTASSIUM SERPL-MCNC: 3.9 MMOL/L — SIGNIFICANT CHANGE UP (ref 3.5–5.3)
POTASSIUM SERPL-SCNC: 3.9 MMOL/L — SIGNIFICANT CHANGE UP (ref 3.5–5.3)
RBC # BLD: 3.22 M/UL — LOW (ref 3.8–5.2)
RBC # FLD: 13.3 % — SIGNIFICANT CHANGE UP (ref 10.3–14.5)
SODIUM SERPL-SCNC: 139 MMOL/L — SIGNIFICANT CHANGE UP (ref 135–145)
WBC # BLD: 10.56 K/UL — HIGH (ref 3.8–10.5)
WBC # FLD AUTO: 10.56 K/UL — HIGH (ref 3.8–10.5)

## 2017-03-06 PROCEDURE — 99233 SBSQ HOSP IP/OBS HIGH 50: CPT

## 2017-03-06 RX ORDER — QUETIAPINE FUMARATE 200 MG/1
25 TABLET, FILM COATED ORAL AT BEDTIME
Qty: 0 | Refills: 0 | Status: DISCONTINUED | OUTPATIENT
Start: 2017-03-06 | End: 2017-03-07

## 2017-03-06 RX ORDER — SODIUM CHLORIDE 9 MG/ML
1000 INJECTION INTRAMUSCULAR; INTRAVENOUS; SUBCUTANEOUS
Qty: 0 | Refills: 0 | Status: DISCONTINUED | OUTPATIENT
Start: 2017-03-06 | End: 2017-03-07

## 2017-03-06 RX ADMIN — Medication 100 MICROGRAM(S): at 07:57

## 2017-03-06 RX ADMIN — HALOPERIDOL DECANOATE 0.5 MILLIGRAM(S): 100 INJECTION INTRAMUSCULAR at 18:23

## 2017-03-06 RX ADMIN — Medication 1: at 18:23

## 2017-03-06 RX ADMIN — CEFEPIME 100 MILLIGRAM(S): 1 INJECTION, POWDER, FOR SOLUTION INTRAMUSCULAR; INTRAVENOUS at 20:59

## 2017-03-06 RX ADMIN — HALOPERIDOL DECANOATE 0.5 MILLIGRAM(S): 100 INJECTION INTRAMUSCULAR at 07:54

## 2017-03-06 RX ADMIN — POLYETHYLENE GLYCOL 3350 17 GRAM(S): 17 POWDER, FOR SOLUTION ORAL at 18:27

## 2017-03-06 RX ADMIN — CEFEPIME 100 MILLIGRAM(S): 1 INJECTION, POWDER, FOR SOLUTION INTRAMUSCULAR; INTRAVENOUS at 09:11

## 2017-03-06 NOTE — DISCHARGE NOTE ADULT - CARE PROVIDER_API CALL
Kee Kurtz), Urology  [address] [place] [id number]  Phone: [phone]  Fax: [phone]

## 2017-03-06 NOTE — DISCHARGE NOTE ADULT - MEDICATION SUMMARY - MEDICATIONS TO TAKE
I will START or STAY ON the medications listed below when I get home from the hospital:    acetaminophen 325 mg oral tablet  -- 2 tab(s) by mouth every 6 hours, As needed, For Temp greater than 38 C (100.4 F)  -- Indication: For fever     insulin lispro 100 units/mL subcutaneous solution  --  subcutaneous 3 times a day (before meals); 1 Unit(s) if Glucose 151 - 200  2 Unit(s) if Glucose 201 - 250  3 Unit(s) if Glucose 251 - 300  4 Unit(s) if Glucose 301 - 350  5 Unit(s) if Glucose 351 - 400  6 Unit(s) if Glucose Greater Than 400  -- Indication: For DMT2    risperiDONE 0.5 mg oral tablet  -- 1 tab(s) by mouth 2 times a day  -- Indication: For Dementia     risperiDONE 0.5 mg oral tablet  -- 1 tab(s) by mouth every 6 hours, As needed, agitation  -- Indication: For Dementia     OLANZapine 10 mg intramuscular injection  -- 2.5 milligram(s) intramuscular every 6 hours, As needed, agitation  -- Indication: For Dementia     cefepime  -- 1 gram(s) intravenous every 12 hours  -- Indication: For sepsis      donepezil 5 mg oral tablet  -- 1 tab(s) by mouth once a day (at bedtime)  -- Indication: For Dementia     vancomycin  -- 1 gram(s) intravenous every 12 hours  -- Indication: For sepsis    Dextrose 5% with 0.45% NaCl intravenous solution  -- 1000 milliliter(s) intravenous   -- Indication: For Hydration    polyethylene glycol 3350 oral powder for reconstitution  -- 17 gram(s) by mouth once a day, As Needed  -- Indication: For Constipation    levothyroxine 100 mcg (0.1 mg) oral tablet  -- 1 tab(s) by mouth once a day  -- Indication: For Thyroid I will START or STAY ON the medications listed below when I get home from the hospital:    acetaminophen 325 mg oral tablet  -- 2 tab(s) by mouth every 6 hours, As needed, For Temp greater than 38 C (100.4 F)  -- Indication: For fever     insulin lispro 100 units/mL subcutaneous solution  --  subcutaneous 3 times a day (before meals); 1 Unit(s) if Glucose 151 - 200  2 Unit(s) if Glucose 201 - 250  3 Unit(s) if Glucose 251 - 300  4 Unit(s) if Glucose 301 - 350  5 Unit(s) if Glucose 351 - 400  6 Unit(s) if Glucose Greater Than 400  -- Indication: For DMT2    risperiDONE 0.5 mg oral tablet  -- 1 tab(s) by mouth 2 times a day  -- Indication: For Agitation     risperiDONE 0.5 mg oral tablet  -- 1 tab(s) by mouth every 6 hours, As needed, agitation  -- Indication: For Agitation     OLANZapine 10 mg intramuscular injection  -- 2.5 milligram(s) intramuscular every 6 hours, As needed, agitation  -- Indication: For Agitation     cefepime  -- 1 gram(s) intravenous every 12 hours  -- Indication: For Sepsis     donepezil 5 mg oral tablet  -- 1 tab(s) by mouth once a day (at bedtime)  -- Indication: For Dementia     vancomycin  -- 1 gram(s) intravenous every 12 hours  -- Indication: For Sepsis    Dextrose 5% with 0.45% NaCl intravenous solution  -- 1000 milliliter(s) intravenous   -- Indication: For Hydration     polyethylene glycol 3350 oral powder for reconstitution  -- 17 gram(s) by mouth once a day, As Needed  -- Indication: For Constipation     levothyroxine 100 mcg (0.1 mg) oral tablet  -- 1 tab(s) by mouth once a day  -- Indication: For Thyroid

## 2017-03-06 NOTE — DISCHARGE NOTE ADULT - SECONDARY DIAGNOSIS.
Kidney mass Essential hypertension Diabetes Alzheimer's dementia with behavioral disturbance, unspecified timing of dementia onset

## 2017-03-06 NOTE — DISCHARGE NOTE ADULT - PLAN OF CARE
s/p fever  / s/p nephrostomy tube  / seen and followed by urology  / s/p nephrostomy drain in IR IV fluids NS 75ml / hr  / c/w abx. / blood cultures x2 (3/7)  Vancomycin 1 g q 12 h  Cefepime 1g q 12 h  right nephrostomy tube in place  Urology will follow - Dr. Kurtz - accepted patient at [facility] (urology team aware) s/p CT abd. / pelvis As above   Urology will follow - Dr. Kurtz sepsis  / vital signs monitored As above FS q ac and hs  / consistent carbohydrate diet FS q ac and hs   Oral home meds held   Insulin coverage as above seen and followed by psych Risperdal as per psych intermittently prn

## 2017-03-06 NOTE — DISCHARGE NOTE ADULT - CARE PROVIDERS DIRECT ADDRESSES
[email or web address]

## 2017-03-06 NOTE — DISCHARGE NOTE ADULT - ADDITIONAL INSTRUCTIONS
Transfer to [facility]  Accepted to urology  - Dr. Kurtz   Continue with antibiotics as above

## 2017-03-06 NOTE — DISCHARGE NOTE ADULT - PATIENT PORTAL LINK FT
“You can access the [facility] Patient Portal, offered by [facility], by registering with the following website: [email or web address]”

## 2017-03-06 NOTE — DISCHARGE NOTE ADULT - CARE PLAN
Principal Discharge DX:	Sepsis  Goal:	s/p fever  / s/p nephrostomy tube  / seen and followed by urology  / s/p nephrostomy drain in IR  Instructions for follow-up, activity and diet:	IV fluids NS 75ml / hr  / c/w abx. / blood cultures x2 (3/7)  Vancomycin 1 g q 12 h  Cefepime 1g q 12 h  right nephrostomy tube in place  Urology will follow - Dr. Kurtz - accepted patient at [facility] (urology team aware)  Secondary Diagnosis:	Kidney mass  Goal:	s/p CT abd. / pelvis  Instructions for follow-up, activity and diet:	As above   Urology will follow - Dr. Kurtz  Secondary Diagnosis:	Essential hypertension  Goal:	sepsis  / vital signs monitored  Instructions for follow-up, activity and diet:	As above  Secondary Diagnosis:	Diabetes  Goal:	FS q ac and hs  / consistent carbohydrate diet  Instructions for follow-up, activity and diet:	FS q ac and hs   Oral home meds held   Insulin coverage as above  Secondary Diagnosis:	Alzheimer's dementia with behavioral disturbance, unspecified timing of dementia onset  Goal:	seen and followed by psych  Instructions for follow-up, activity and diet:	Risperdal as per psych intermittently prn Principal Discharge DX:	Sepsis  Goal:	s/p fever  / s/p nephrostomy tube  / seen and followed by urology  / s/p nephrostomy drain in IR  Instructions for follow-up, activity and diet:	IV fluids NS 75ml / hr  / c/w abx. / blood cultures x2 (3/7)  Vancomycin 1 g q 12 h  Cefepime 1g q 12 h  right nephrostomy tube in place  Urology will follow - Dr. Kurtz - accepted patient at [facility] (urology team aware)  Secondary Diagnosis:	Kidney mass  Goal:	s/p CT abd. / pelvis  Instructions for follow-up, activity and diet:	As above   Urology will follow - Dr. Kurtz  Secondary Diagnosis:	Essential hypertension  Goal:	sepsis  / vital signs monitored  Instructions for follow-up, activity and diet:	As above  Secondary Diagnosis:	Diabetes  Goal:	FS q ac and hs  / consistent carbohydrate diet  Instructions for follow-up, activity and diet:	FS q ac and hs   Oral home meds held   Insulin coverage as above  Secondary Diagnosis:	Alzheimer's dementia with behavioral disturbance, unspecified timing of dementia onset  Goal:	seen and followed by psych  Instructions for follow-up, activity and diet:	Risperdal as per psych intermittently prn Principal Discharge DX:	Sepsis  Goal:	s/p fever  / s/p nephrostomy tube  / seen and followed by urology  / s/p nephrostomy drain in IR  Instructions for follow-up, activity and diet:	IV fluids NS 75ml / hr  / c/w abx. / blood cultures x2 (3/7)  Vancomycin 1 g q 12 h  Cefepime 1g q 12 h  right nephrostomy tube in place  Urology will follow - Dr. Kurtz - accepted patient at [facility] (urology team aware)  Secondary Diagnosis:	Kidney mass  Goal:	s/p CT abd. / pelvis  Instructions for follow-up, activity and diet:	As above   Urology will follow - Dr. Kurtz  Secondary Diagnosis:	Essential hypertension  Goal:	sepsis  / vital signs monitored  Instructions for follow-up, activity and diet:	As above  Secondary Diagnosis:	Diabetes  Goal:	FS q ac and hs  / consistent carbohydrate diet  Instructions for follow-up, activity and diet:	FS q ac and hs   Oral home meds held   Insulin coverage as above  Secondary Diagnosis:	Alzheimer's dementia with behavioral disturbance, unspecified timing of dementia onset  Goal:	seen and followed by psych  Instructions for follow-up, activity and diet:	Risperdal as per psych intermittently prn Principal Discharge DX:	Sepsis  Goal:	s/p fever  / s/p nephrostomy tube  / seen and followed by urology  / s/p nephrostomy drain in IR  Instructions for follow-up, activity and diet:	IV fluids NS 75ml / hr  / c/w abx. / blood cultures x2 (3/7)  Vancomycin 1 g q 12 h  Cefepime 1g q 12 h  right nephrostomy tube in place  Urology will follow - Dr. Kurtz - accepted patient at [facility] (urology team aware)  Secondary Diagnosis:	Kidney mass  Goal:	s/p CT abd. / pelvis  Instructions for follow-up, activity and diet:	As above   Urology will follow - Dr. Kurtz  Secondary Diagnosis:	Essential hypertension  Goal:	sepsis  / vital signs monitored  Instructions for follow-up, activity and diet:	As above  Secondary Diagnosis:	Diabetes  Goal:	FS q ac and hs  / consistent carbohydrate diet  Instructions for follow-up, activity and diet:	FS q ac and hs   Oral home meds held   Insulin coverage as above  Secondary Diagnosis:	Alzheimer's dementia with behavioral disturbance, unspecified timing of dementia onset  Goal:	seen and followed by psych  Instructions for follow-up, activity and diet:	Risperdal as per psych intermittently prn Principal Discharge DX:	Sepsis  Goal:	s/p fever  / s/p nephrostomy tube  / seen and followed by urology  / s/p nephrostomy drain in IR  Instructions for follow-up, activity and diet:	IV fluids NS 75ml / hr  / c/w abx. / blood cultures x2 (3/7)  Vancomycin 1 g q 12 h  Cefepime 1g q 12 h  right nephrostomy tube in place  Urology will follow - Dr. Kurtz - accepted patient at [facility] (urology team aware)  Secondary Diagnosis:	Kidney mass  Goal:	s/p CT abd. / pelvis  Instructions for follow-up, activity and diet:	As above   Urology will follow - Dr. Kurtz  Secondary Diagnosis:	Essential hypertension  Goal:	sepsis  / vital signs monitored  Instructions for follow-up, activity and diet:	As above  Secondary Diagnosis:	Diabetes  Goal:	FS q ac and hs  / consistent carbohydrate diet  Instructions for follow-up, activity and diet:	FS q ac and hs   Oral home meds held   Insulin coverage as above  Secondary Diagnosis:	Alzheimer's dementia with behavioral disturbance, unspecified timing of dementia onset  Goal:	seen and followed by psych  Instructions for follow-up, activity and diet:	Risperdal as per psych intermittently prn

## 2017-03-06 NOTE — DISCHARGE NOTE ADULT - HOSPITAL COURSE
to be completed by MD Admitted patient for Renal mass with Rt Hydroureteronephrosis, seen by Urology who recommend IR guided Nephrostomy Drain.  Course got complicated with sepsis s/p Nephrostomy tube, started patient on Vancomycin and continued with Maxipime.     Patient was transferred to [facility] ass per family s request as patient Urologist Dr. Shepherd at [facility] can take care of her. 81F hx dm2, htn, hypothyroid, alzheimers, R renal mass p/w R leg swelling x3d. per the family the patient has had increasing R calf swelling over the past 3 days. due to advanced alzheimers dementia the patient has not been able to describe any pain in the area. however when she saw her pmd today she became very agitated when he touched her R leg. they notes that today she was overall more agitated than usual. they deny increase in urination, foul smelling urine, fever, chills, abd pain, diarrhea, nausea/vomiting, shortness of breath.    Dopplers came back positive for Acute Thrombus in Rt Tibioperoneal, tibial and Soleal vein.     Admitted patient for Acute DVT Rt lower extremity, held anti coagulation given patient scheduled for IR guided Nephrostomy tube placement with Renal mass with Rt Hydroureteronephrosis. Patient was started on Maxipime with positive UA, UTI.   Patient was seen and followed by Urology through out hospital course.   Course got complicated with sepsis s/p Nephrostomy tube, started patient on Vancomycin and continued with Maxipime.     Patient was transferred to [facility] as per family s request as patient Urologist Dr. Shepherd at [facility] to schedule for Renal mass biopsy.   Patient was seen by Psych who recommend to discontinue Seroquel and start patient on Risperidone and Zyprexa.     Discharge Diagnosis   Acute DVT Rt lower extremity  UTI   Rt Hydroureteronephrosis s/p Nephrostomy Drain   Sepsis s/p Nephrostomy Drain  Acute Behavioural change with Dementia  Hypothyroidism  Diabetes type 2

## 2017-03-06 NOTE — DISCHARGE NOTE ADULT - MEDICATION SUMMARY - MEDICATIONS TO STOP TAKING
I will STOP taking the medications listed below when I get home from the hospital:    nitrofurantoin  --  by mouth    metFORMIN  --  by mouth    glipiZIDE  --  by mouth    metFORMIN 1000 mg oral tablet, extended release  -- 1 tab(s) by mouth once a day    glipiZIDE 2.5 mg oral tablet, extended release  -- 1 tab(s) by mouth once a day I will STOP taking the medications listed below when I get home from the hospital:    nitrofurantoin  --  by mouth    metFORMIN  --  by mouth    glipiZIDE  --  by mouth    Diovan  --  by mouth    Aspir 81  --  by mouth    Diovan 80 mg oral capsule  -- 1 tab(s) by mouth once a day    metFORMIN 1000 mg oral tablet, extended release  -- 1 tab(s) by mouth once a day    glipiZIDE 2.5 mg oral tablet, extended release  -- 1 tab(s) by mouth once a day

## 2017-03-07 ENCOUNTER — APPOINTMENT (OUTPATIENT)
Dept: UROLOGY | Facility: CLINIC | Age: 82
End: 2017-03-07

## 2017-03-07 LAB
ANION GAP SERPL CALC-SCNC: 14 MMOL/L — SIGNIFICANT CHANGE UP (ref 5–17)
BUN SERPL-MCNC: 7 MG/DL — SIGNIFICANT CHANGE UP (ref 7–23)
CALCIUM SERPL-MCNC: 9 MG/DL — SIGNIFICANT CHANGE UP (ref 8.4–10.5)
CHLORIDE SERPL-SCNC: 102 MMOL/L — SIGNIFICANT CHANGE UP (ref 96–108)
CO2 SERPL-SCNC: 21 MMOL/L — LOW (ref 22–31)
CREAT SERPL-MCNC: 0.81 MG/DL — SIGNIFICANT CHANGE UP (ref 0.5–1.3)
CULTURE RESULTS: SIGNIFICANT CHANGE UP
GLUCOSE SERPL-MCNC: 148 MG/DL — HIGH (ref 70–99)
HCT VFR BLD CALC: 30.2 % — LOW (ref 34.5–45)
HGB BLD-MCNC: 9.8 G/DL — LOW (ref 11.5–15.5)
MCHC RBC-ENTMCNC: 29.7 PG — SIGNIFICANT CHANGE UP (ref 27–34)
MCHC RBC-ENTMCNC: 32.5 GM/DL — SIGNIFICANT CHANGE UP (ref 32–36)
MCV RBC AUTO: 91.5 FL — SIGNIFICANT CHANGE UP (ref 80–100)
PLATELET # BLD AUTO: 649 K/UL — HIGH (ref 150–400)
POTASSIUM SERPL-MCNC: 3.5 MMOL/L — SIGNIFICANT CHANGE UP (ref 3.5–5.3)
POTASSIUM SERPL-SCNC: 3.5 MMOL/L — SIGNIFICANT CHANGE UP (ref 3.5–5.3)
RBC # BLD: 3.3 M/UL — LOW (ref 3.8–5.2)
RBC # FLD: 13.4 % — SIGNIFICANT CHANGE UP (ref 10.3–14.5)
SODIUM SERPL-SCNC: 137 MMOL/L — SIGNIFICANT CHANGE UP (ref 135–145)
SPECIMEN SOURCE: SIGNIFICANT CHANGE UP
WBC # BLD: 12.98 K/UL — HIGH (ref 3.8–10.5)
WBC # FLD AUTO: 12.98 K/UL — HIGH (ref 3.8–10.5)

## 2017-03-07 PROCEDURE — 50432 PLMT NEPHROSTOMY CATHETER: CPT | Mod: RT

## 2017-03-07 PROCEDURE — 88112 CYTOPATH CELL ENHANCE TECH: CPT | Mod: 26

## 2017-03-07 PROCEDURE — 99233 SBSQ HOSP IP/OBS HIGH 50: CPT

## 2017-03-07 PROCEDURE — 99222 1ST HOSP IP/OBS MODERATE 55: CPT

## 2017-03-07 PROCEDURE — 71010: CPT | Mod: 26

## 2017-03-07 RX ORDER — SODIUM CHLORIDE 9 MG/ML
1000 INJECTION, SOLUTION INTRAVENOUS
Qty: 0 | Refills: 0 | Status: DISCONTINUED | OUTPATIENT
Start: 2017-03-07 | End: 2017-03-08

## 2017-03-07 RX ORDER — ACETAMINOPHEN 500 MG
650 TABLET ORAL ONCE
Qty: 0 | Refills: 0 | Status: COMPLETED | OUTPATIENT
Start: 2017-03-07 | End: 2017-03-07

## 2017-03-07 RX ORDER — QUETIAPINE FUMARATE 200 MG/1
25 TABLET, FILM COATED ORAL ONCE
Qty: 0 | Refills: 0 | Status: COMPLETED | OUTPATIENT
Start: 2017-03-07 | End: 2017-03-08

## 2017-03-07 RX ORDER — RISPERIDONE 4 MG/1
0.5 TABLET ORAL
Qty: 0 | Refills: 0 | Status: DISCONTINUED | OUTPATIENT
Start: 2017-03-07 | End: 2017-03-08

## 2017-03-07 RX ORDER — VANCOMYCIN HCL 1 G
1000 VIAL (EA) INTRAVENOUS EVERY 12 HOURS
Qty: 0 | Refills: 0 | Status: DISCONTINUED | OUTPATIENT
Start: 2017-03-08 | End: 2017-03-08

## 2017-03-07 RX ORDER — RISPERIDONE 4 MG/1
0.5 TABLET ORAL EVERY 6 HOURS
Qty: 0 | Refills: 0 | Status: DISCONTINUED | OUTPATIENT
Start: 2017-03-07 | End: 2017-03-08

## 2017-03-07 RX ORDER — VANCOMYCIN HCL 1 G
1000 VIAL (EA) INTRAVENOUS ONCE
Qty: 0 | Refills: 0 | Status: COMPLETED | OUTPATIENT
Start: 2017-03-07 | End: 2017-03-07

## 2017-03-07 RX ORDER — OLANZAPINE 15 MG/1
2.5 TABLET, FILM COATED ORAL ONCE
Qty: 0 | Refills: 0 | Status: COMPLETED | OUTPATIENT
Start: 2017-03-07 | End: 2017-03-07

## 2017-03-07 RX ORDER — OLANZAPINE 15 MG/1
2.5 TABLET, FILM COATED ORAL EVERY 6 HOURS
Qty: 0 | Refills: 0 | Status: DISCONTINUED | OUTPATIENT
Start: 2017-03-07 | End: 2017-03-08

## 2017-03-07 RX ORDER — VANCOMYCIN HCL 1 G
VIAL (EA) INTRAVENOUS
Qty: 0 | Refills: 0 | Status: DISCONTINUED | OUTPATIENT
Start: 2017-03-07 | End: 2017-03-08

## 2017-03-07 RX ADMIN — POLYETHYLENE GLYCOL 3350 17 GRAM(S): 17 POWDER, FOR SOLUTION ORAL at 17:46

## 2017-03-07 RX ADMIN — SODIUM CHLORIDE 75 MILLILITER(S): 9 INJECTION INTRAMUSCULAR; INTRAVENOUS; SUBCUTANEOUS at 16:39

## 2017-03-07 RX ADMIN — CEFEPIME 100 MILLIGRAM(S): 1 INJECTION, POWDER, FOR SOLUTION INTRAMUSCULAR; INTRAVENOUS at 23:59

## 2017-03-07 RX ADMIN — Medication 650 MILLIGRAM(S): at 16:38

## 2017-03-07 RX ADMIN — CEFEPIME 100 MILLIGRAM(S): 1 INJECTION, POWDER, FOR SOLUTION INTRAMUSCULAR; INTRAVENOUS at 13:29

## 2017-03-07 RX ADMIN — Medication 250 MILLIGRAM(S): at 18:37

## 2017-03-07 RX ADMIN — SODIUM CHLORIDE 75 MILLILITER(S): 9 INJECTION, SOLUTION INTRAVENOUS at 22:27

## 2017-03-07 RX ADMIN — SODIUM CHLORIDE 75 MILLILITER(S): 9 INJECTION, SOLUTION INTRAVENOUS at 17:45

## 2017-03-07 RX ADMIN — OLANZAPINE 2.5 MILLIGRAM(S): 15 TABLET, FILM COATED ORAL at 11:32

## 2017-03-07 RX ADMIN — Medication 100 MICROGRAM(S): at 06:53

## 2017-03-07 NOTE — PROVIDER CONTACT NOTE (MEDICATION) - ACTION/TREATMENT ORDERED:
DAVE Harris made aware. No further interventions at this time. Will continue to monitor patient. Call bell within easy reach. Patient safety maintained.

## 2017-03-07 NOTE — PROVIDER CONTACT NOTE (MEDICATION) - ASSESSMENT
Patient A&Ox1, VSS. Patient family members at the bedside-son and daughter. Education provided on indications and importance of medications. Family still refusing medication for patient after understanding of medication. Patient resting comfortably in bed. Patient without signs or symptoms of distress or discomfort at this time.

## 2017-03-07 NOTE — PROVIDER CONTACT NOTE (OTHER) - ACTION/TREATMENT ORDERED:
DAVE Mahoney made aware, place ice packs ( tylenol given a couple of hours prior), then take temp at 2000 and report results to NP.

## 2017-03-08 ENCOUNTER — INPATIENT (INPATIENT)
Facility: HOSPITAL | Age: 82
LOS: 2 days | Discharge: HOME | End: 2017-03-11
Attending: UROLOGY | Admitting: UROLOGY
Payer: MEDICAID

## 2017-03-08 VITALS
DIASTOLIC BLOOD PRESSURE: 71 MMHG | HEART RATE: 86 BPM | TEMPERATURE: 98 F | SYSTOLIC BLOOD PRESSURE: 131 MMHG | OXYGEN SATURATION: 94 % | RESPIRATION RATE: 16 BRPM

## 2017-03-08 VITALS
SYSTOLIC BLOOD PRESSURE: 122 MMHG | HEART RATE: 92 BPM | TEMPERATURE: 98 F | OXYGEN SATURATION: 95 % | RESPIRATION RATE: 18 BRPM | DIASTOLIC BLOOD PRESSURE: 75 MMHG

## 2017-03-08 DIAGNOSIS — K80.20 CALCULUS OF GALLBLADDER WITHOUT CHOLECYSTITIS WITHOUT OBSTRUCTION: Chronic | ICD-10-CM

## 2017-03-08 DIAGNOSIS — N13.30 UNSPECIFIED HYDRONEPHROSIS: ICD-10-CM

## 2017-03-08 DIAGNOSIS — N28.89 OTHER SPECIFIED DISORDERS OF KIDNEY AND URETER: ICD-10-CM

## 2017-03-08 DIAGNOSIS — N39.0 URINARY TRACT INFECTION, SITE NOT SPECIFIED: ICD-10-CM

## 2017-03-08 DIAGNOSIS — R45.1 RESTLESSNESS AND AGITATION: ICD-10-CM

## 2017-03-08 DIAGNOSIS — Z87.442 PERSONAL HISTORY OF URINARY CALCULI: Chronic | ICD-10-CM

## 2017-03-08 DIAGNOSIS — Z90.49 ACQUIRED ABSENCE OF OTHER SPECIFIED PARTS OF DIGESTIVE TRACT: Chronic | ICD-10-CM

## 2017-03-08 LAB
ANION GAP SERPL CALC-SCNC: 11 MMOL/L — SIGNIFICANT CHANGE UP (ref 5–17)
BASOPHILS # BLD AUTO: 0.1 K/UL — SIGNIFICANT CHANGE UP (ref 0–0.2)
BASOPHILS NFR BLD AUTO: 0.4 % — SIGNIFICANT CHANGE UP (ref 0–2)
BUN SERPL-MCNC: 7 MG/DL — SIGNIFICANT CHANGE UP (ref 7–23)
CALCIUM SERPL-MCNC: 8.9 MG/DL — SIGNIFICANT CHANGE UP (ref 8.4–10.5)
CHLORIDE SERPL-SCNC: 105 MMOL/L — SIGNIFICANT CHANGE UP (ref 96–108)
CO2 SERPL-SCNC: 26 MMOL/L — SIGNIFICANT CHANGE UP (ref 22–31)
CREAT SERPL-MCNC: 0.56 MG/DL — SIGNIFICANT CHANGE UP (ref 0.5–1.3)
EOSINOPHIL # BLD AUTO: 0.2 K/UL — SIGNIFICANT CHANGE UP (ref 0–0.5)
EOSINOPHIL NFR BLD AUTO: 1.4 % — SIGNIFICANT CHANGE UP (ref 0–6)
GLUCOSE SERPL-MCNC: 195 MG/DL — HIGH (ref 70–99)
HCT VFR BLD CALC: 27.4 % — LOW (ref 34.5–45)
HGB BLD-MCNC: 9.2 G/DL — LOW (ref 11.5–15.5)
LYMPHOCYTES # BLD AUTO: 19.9 % — SIGNIFICANT CHANGE UP (ref 13–44)
LYMPHOCYTES # BLD AUTO: 3.2 K/UL — SIGNIFICANT CHANGE UP (ref 1–3.3)
MCHC RBC-ENTMCNC: 31.6 PG — SIGNIFICANT CHANGE UP (ref 27–34)
MCHC RBC-ENTMCNC: 33.8 GM/DL — SIGNIFICANT CHANGE UP (ref 32–36)
MCV RBC AUTO: 93.6 FL — SIGNIFICANT CHANGE UP (ref 80–100)
MONOCYTES # BLD AUTO: 0.7 K/UL — SIGNIFICANT CHANGE UP (ref 0–0.9)
MONOCYTES NFR BLD AUTO: 4.3 % — SIGNIFICANT CHANGE UP (ref 2–14)
NEUTROPHILS # BLD AUTO: 11.9 K/UL — HIGH (ref 1.8–7.4)
NEUTROPHILS NFR BLD AUTO: 73.9 % — SIGNIFICANT CHANGE UP (ref 43–77)
PLATELET # BLD AUTO: 488 K/UL — HIGH (ref 150–400)
POTASSIUM SERPL-MCNC: 2.7 MMOL/L — CRITICAL LOW (ref 3.5–5.3)
POTASSIUM SERPL-SCNC: 2.7 MMOL/L — CRITICAL LOW (ref 3.5–5.3)
RBC # BLD: 2.92 M/UL — LOW (ref 3.8–5.2)
RBC # FLD: 12.5 % — SIGNIFICANT CHANGE UP (ref 10.3–14.5)
SODIUM SERPL-SCNC: 142 MMOL/L — SIGNIFICANT CHANGE UP (ref 135–145)
WBC # BLD: 16 K/UL — HIGH (ref 3.8–10.5)
WBC # FLD AUTO: 16 K/UL — HIGH (ref 3.8–10.5)

## 2017-03-08 PROCEDURE — 87086 URINE CULTURE/COLONY COUNT: CPT

## 2017-03-08 PROCEDURE — C1769: CPT

## 2017-03-08 PROCEDURE — 80053 COMPREHEN METABOLIC PANEL: CPT

## 2017-03-08 PROCEDURE — C1729: CPT

## 2017-03-08 PROCEDURE — 82435 ASSAY OF BLOOD CHLORIDE: CPT

## 2017-03-08 PROCEDURE — 85027 COMPLETE CBC AUTOMATED: CPT

## 2017-03-08 PROCEDURE — 82803 BLOOD GASES ANY COMBINATION: CPT

## 2017-03-08 PROCEDURE — 84132 ASSAY OF SERUM POTASSIUM: CPT

## 2017-03-08 PROCEDURE — 80048 BASIC METABOLIC PNL TOTAL CA: CPT

## 2017-03-08 PROCEDURE — 96375 TX/PRO/DX INJ NEW DRUG ADDON: CPT | Mod: XU

## 2017-03-08 PROCEDURE — 96372 THER/PROPH/DIAG INJ SC/IM: CPT | Mod: XU

## 2017-03-08 PROCEDURE — 85610 PROTHROMBIN TIME: CPT

## 2017-03-08 PROCEDURE — 88112 CYTOPATH CELL ENHANCE TECH: CPT

## 2017-03-08 PROCEDURE — 99233 SBSQ HOSP IP/OBS HIGH 50: CPT

## 2017-03-08 PROCEDURE — 50432 PLMT NEPHROSTOMY CATHETER: CPT

## 2017-03-08 PROCEDURE — 85014 HEMATOCRIT: CPT

## 2017-03-08 PROCEDURE — 83605 ASSAY OF LACTIC ACID: CPT

## 2017-03-08 PROCEDURE — 83036 HEMOGLOBIN GLYCOSYLATED A1C: CPT

## 2017-03-08 PROCEDURE — 82947 ASSAY GLUCOSE BLOOD QUANT: CPT

## 2017-03-08 PROCEDURE — 74177 CT ABD & PELVIS W/CONTRAST: CPT

## 2017-03-08 PROCEDURE — 93971 EXTREMITY STUDY: CPT

## 2017-03-08 PROCEDURE — 71045 X-RAY EXAM CHEST 1 VIEW: CPT

## 2017-03-08 PROCEDURE — 82330 ASSAY OF CALCIUM: CPT

## 2017-03-08 PROCEDURE — 84295 ASSAY OF SERUM SODIUM: CPT

## 2017-03-08 PROCEDURE — 99285 EMERGENCY DEPT VISIT HI MDM: CPT | Mod: 25

## 2017-03-08 PROCEDURE — 85730 THROMBOPLASTIN TIME PARTIAL: CPT

## 2017-03-08 PROCEDURE — 81001 URINALYSIS AUTO W/SCOPE: CPT

## 2017-03-08 PROCEDURE — 99232 SBSQ HOSP IP/OBS MODERATE 35: CPT

## 2017-03-08 PROCEDURE — 96365 THER/PROPH/DIAG IV INF INIT: CPT | Mod: XU

## 2017-03-08 PROCEDURE — 96367 TX/PROPH/DG ADDL SEQ IV INF: CPT | Mod: XU

## 2017-03-08 PROCEDURE — 93005 ELECTROCARDIOGRAM TRACING: CPT

## 2017-03-08 PROCEDURE — C1894: CPT

## 2017-03-08 PROCEDURE — 87040 BLOOD CULTURE FOR BACTERIA: CPT

## 2017-03-08 PROCEDURE — 99231 SBSQ HOSP IP/OBS SF/LOW 25: CPT

## 2017-03-08 RX ORDER — SODIUM CHLORIDE 9 MG/ML
1000 INJECTION INTRAMUSCULAR; INTRAVENOUS; SUBCUTANEOUS
Qty: 0 | Refills: 0 | Status: DISCONTINUED | OUTPATIENT
Start: 2017-03-08 | End: 2017-03-08

## 2017-03-08 RX ORDER — FLUCONAZOLE 150 MG/1
200 TABLET ORAL ONCE
Qty: 0 | Refills: 0 | Status: COMPLETED | OUTPATIENT
Start: 2017-03-08 | End: 2017-03-08

## 2017-03-08 RX ORDER — VANCOMYCIN HCL 1 G
1 VIAL (EA) INTRAVENOUS
Qty: 0 | Refills: 0 | COMMUNITY
Start: 2017-03-08

## 2017-03-08 RX ORDER — DONEPEZIL HYDROCHLORIDE 10 MG/1
1 TABLET, FILM COATED ORAL
Qty: 0 | Refills: 0 | COMMUNITY

## 2017-03-08 RX ORDER — INSULIN LISPRO 100/ML
0 VIAL (ML) SUBCUTANEOUS
Qty: 0 | Refills: 0 | COMMUNITY
Start: 2017-03-08

## 2017-03-08 RX ORDER — POTASSIUM CHLORIDE 20 MEQ
10 PACKET (EA) ORAL
Qty: 0 | Refills: 0 | Status: COMPLETED | OUTPATIENT
Start: 2017-03-08 | End: 2017-03-08

## 2017-03-08 RX ORDER — LEVOTHYROXINE SODIUM 125 MCG
100 TABLET ORAL DAILY
Qty: 0 | Refills: 0 | Status: DISCONTINUED | OUTPATIENT
Start: 2017-03-08 | End: 2017-03-11

## 2017-03-08 RX ORDER — POLYETHYLENE GLYCOL 3350 17 G/17G
17 POWDER, FOR SOLUTION ORAL DAILY
Qty: 0 | Refills: 0 | Status: DISCONTINUED | OUTPATIENT
Start: 2017-03-08 | End: 2017-03-11

## 2017-03-08 RX ORDER — DEXTROSE 50 % IN WATER 50 %
25 SYRINGE (ML) INTRAVENOUS ONCE
Qty: 0 | Refills: 0 | Status: DISCONTINUED | OUTPATIENT
Start: 2017-03-08 | End: 2017-03-11

## 2017-03-08 RX ORDER — VANCOMYCIN HCL 1 G
1000 VIAL (EA) INTRAVENOUS EVERY 12 HOURS
Qty: 0 | Refills: 0 | Status: DISCONTINUED | OUTPATIENT
Start: 2017-03-08 | End: 2017-03-09

## 2017-03-08 RX ORDER — PIPERACILLIN AND TAZOBACTAM 4; .5 G/20ML; G/20ML
3.38 INJECTION, POWDER, LYOPHILIZED, FOR SOLUTION INTRAVENOUS EVERY 8 HOURS
Qty: 0 | Refills: 0 | Status: DISCONTINUED | OUTPATIENT
Start: 2017-03-08 | End: 2017-03-11

## 2017-03-08 RX ORDER — HEPARIN SODIUM 5000 [USP'U]/ML
5000 INJECTION INTRAVENOUS; SUBCUTANEOUS EVERY 8 HOURS
Qty: 0 | Refills: 0 | Status: DISCONTINUED | OUTPATIENT
Start: 2017-03-08 | End: 2017-03-09

## 2017-03-08 RX ORDER — FLUCONAZOLE 150 MG/1
200 TABLET ORAL EVERY 24 HOURS
Qty: 0 | Refills: 0 | Status: DISCONTINUED | OUTPATIENT
Start: 2017-03-09 | End: 2017-03-11

## 2017-03-08 RX ORDER — ACETAMINOPHEN 500 MG
2 TABLET ORAL
Qty: 0 | Refills: 0 | COMMUNITY
Start: 2017-03-08

## 2017-03-08 RX ORDER — ACETAMINOPHEN 500 MG
650 TABLET ORAL EVERY 6 HOURS
Qty: 0 | Refills: 0 | Status: DISCONTINUED | OUTPATIENT
Start: 2017-03-08 | End: 2017-03-11

## 2017-03-08 RX ORDER — CEFEPIME 1 G/1
1 INJECTION, POWDER, FOR SOLUTION INTRAMUSCULAR; INTRAVENOUS
Qty: 0 | Refills: 0 | COMMUNITY
Start: 2017-03-08

## 2017-03-08 RX ORDER — GLUCAGON INJECTION, SOLUTION 0.5 MG/.1ML
1 INJECTION, SOLUTION SUBCUTANEOUS ONCE
Qty: 0 | Refills: 0 | Status: DISCONTINUED | OUTPATIENT
Start: 2017-03-08 | End: 2017-03-11

## 2017-03-08 RX ORDER — POTASSIUM CHLORIDE 20 MEQ
40 PACKET (EA) ORAL ONCE
Qty: 0 | Refills: 0 | Status: COMPLETED | OUTPATIENT
Start: 2017-03-08 | End: 2017-03-08

## 2017-03-08 RX ORDER — RISPERIDONE 4 MG/1
1 TABLET ORAL
Qty: 0 | Refills: 0 | COMMUNITY
Start: 2017-03-08

## 2017-03-08 RX ORDER — ASPIRIN/CALCIUM CARB/MAGNESIUM 324 MG
0 TABLET ORAL
Qty: 0 | Refills: 0 | COMMUNITY

## 2017-03-08 RX ORDER — POLYETHYLENE GLYCOL 3350 17 G/17G
17 POWDER, FOR SOLUTION ORAL
Qty: 0 | Refills: 0 | COMMUNITY
Start: 2017-03-08

## 2017-03-08 RX ORDER — SODIUM CHLORIDE 9 MG/ML
1000 INJECTION, SOLUTION INTRAVENOUS
Qty: 0 | Refills: 0 | COMMUNITY
Start: 2017-03-08

## 2017-03-08 RX ORDER — DEXTROSE 50 % IN WATER 50 %
12.5 SYRINGE (ML) INTRAVENOUS ONCE
Qty: 0 | Refills: 0 | Status: DISCONTINUED | OUTPATIENT
Start: 2017-03-08 | End: 2017-03-11

## 2017-03-08 RX ORDER — DEXTROSE 50 % IN WATER 50 %
1 SYRINGE (ML) INTRAVENOUS ONCE
Qty: 0 | Refills: 0 | Status: DISCONTINUED | OUTPATIENT
Start: 2017-03-08 | End: 2017-03-11

## 2017-03-08 RX ORDER — LEVOTHYROXINE SODIUM 125 MCG
1 TABLET ORAL
Qty: 0 | Refills: 0 | COMMUNITY
Start: 2017-03-08

## 2017-03-08 RX ORDER — LEVOTHYROXINE SODIUM 125 MCG
1 TABLET ORAL
Qty: 0 | Refills: 0 | COMMUNITY

## 2017-03-08 RX ORDER — INSULIN LISPRO 100/ML
VIAL (ML) SUBCUTANEOUS
Qty: 0 | Refills: 0 | Status: DISCONTINUED | OUTPATIENT
Start: 2017-03-08 | End: 2017-03-11

## 2017-03-08 RX ORDER — SODIUM CHLORIDE 9 MG/ML
1000 INJECTION, SOLUTION INTRAVENOUS
Qty: 0 | Refills: 0 | Status: DISCONTINUED | OUTPATIENT
Start: 2017-03-08 | End: 2017-03-11

## 2017-03-08 RX ORDER — DONEPEZIL HYDROCHLORIDE 10 MG/1
5 TABLET, FILM COATED ORAL AT BEDTIME
Qty: 0 | Refills: 0 | Status: DISCONTINUED | OUTPATIENT
Start: 2017-03-08 | End: 2017-03-11

## 2017-03-08 RX ORDER — FLUCONAZOLE 150 MG/1
TABLET ORAL
Qty: 0 | Refills: 0 | Status: DISCONTINUED | OUTPATIENT
Start: 2017-03-08 | End: 2017-03-11

## 2017-03-08 RX ORDER — RISPERIDONE 4 MG/1
0.5 TABLET ORAL EVERY 6 HOURS
Qty: 0 | Refills: 0 | Status: DISCONTINUED | OUTPATIENT
Start: 2017-03-08 | End: 2017-03-11

## 2017-03-08 RX ORDER — METFORMIN HYDROCHLORIDE 850 MG/1
1 TABLET ORAL
Qty: 0 | Refills: 0 | COMMUNITY

## 2017-03-08 RX ORDER — OLANZAPINE 15 MG/1
2.5 TABLET, FILM COATED ORAL EVERY 6 HOURS
Qty: 0 | Refills: 0 | Status: DISCONTINUED | OUTPATIENT
Start: 2017-03-08 | End: 2017-03-11

## 2017-03-08 RX ORDER — VALSARTAN 80 MG/1
1 TABLET ORAL
Qty: 0 | Refills: 0 | COMMUNITY

## 2017-03-08 RX ORDER — DEXTROSE MONOHYDRATE, SODIUM CHLORIDE, AND POTASSIUM CHLORIDE 50; .745; 4.5 G/1000ML; G/1000ML; G/1000ML
1000 INJECTION, SOLUTION INTRAVENOUS
Qty: 0 | Refills: 0 | Status: DISCONTINUED | OUTPATIENT
Start: 2017-03-08 | End: 2017-03-11

## 2017-03-08 RX ORDER — RISPERIDONE 4 MG/1
0.5 TABLET ORAL
Qty: 0 | Refills: 0 | Status: DISCONTINUED | OUTPATIENT
Start: 2017-03-08 | End: 2017-03-11

## 2017-03-08 RX ORDER — ALENDRONATE SODIUM 70 MG/1
70 TABLET ORAL
Qty: 0 | Refills: 0 | COMMUNITY

## 2017-03-08 RX ORDER — CHOLECALCIFEROL (VITAMIN D3) 125 MCG
0 CAPSULE ORAL
Qty: 0 | Refills: 0 | COMMUNITY

## 2017-03-08 RX ORDER — PIPERACILLIN AND TAZOBACTAM 4; .5 G/20ML; G/20ML
3.38 INJECTION, POWDER, LYOPHILIZED, FOR SOLUTION INTRAVENOUS ONCE
Qty: 0 | Refills: 0 | Status: COMPLETED | OUTPATIENT
Start: 2017-03-08 | End: 2017-03-08

## 2017-03-08 RX ORDER — DONEPEZIL HYDROCHLORIDE 10 MG/1
1 TABLET, FILM COATED ORAL
Qty: 0 | Refills: 0 | COMMUNITY
Start: 2017-03-08

## 2017-03-08 RX ORDER — OLANZAPINE 15 MG/1
2.5 TABLET, FILM COATED ORAL
Qty: 0 | Refills: 0 | COMMUNITY
Start: 2017-03-08

## 2017-03-08 RX ADMIN — PIPERACILLIN AND TAZOBACTAM 200 GRAM(S): 4; .5 INJECTION, POWDER, LYOPHILIZED, FOR SOLUTION INTRAVENOUS at 19:21

## 2017-03-08 RX ADMIN — SODIUM CHLORIDE 75 MILLILITER(S): 9 INJECTION INTRAMUSCULAR; INTRAVENOUS; SUBCUTANEOUS at 11:00

## 2017-03-08 RX ADMIN — RISPERIDONE 0.5 MILLIGRAM(S): 4 TABLET ORAL at 06:18

## 2017-03-08 RX ADMIN — RISPERIDONE 0.5 MILLIGRAM(S): 4 TABLET ORAL at 19:50

## 2017-03-08 RX ADMIN — Medication 250 MILLIGRAM(S): at 23:17

## 2017-03-08 RX ADMIN — QUETIAPINE FUMARATE 25 MILLIGRAM(S): 200 TABLET, FILM COATED ORAL at 07:55

## 2017-03-08 RX ADMIN — Medication 2: at 10:02

## 2017-03-08 RX ADMIN — Medication 100 MICROGRAM(S): at 06:18

## 2017-03-08 RX ADMIN — Medication 250 MILLIGRAM(S): at 06:10

## 2017-03-08 RX ADMIN — DONEPEZIL HYDROCHLORIDE 5 MILLIGRAM(S): 10 TABLET, FILM COATED ORAL at 21:53

## 2017-03-08 RX ADMIN — FLUCONAZOLE 100 MILLIGRAM(S): 150 TABLET ORAL at 21:54

## 2017-03-08 RX ADMIN — Medication 40 MILLIEQUIVALENT(S): at 14:35

## 2017-03-08 RX ADMIN — POLYETHYLENE GLYCOL 3350 17 GRAM(S): 17 POWDER, FOR SOLUTION ORAL at 23:16

## 2017-03-08 RX ADMIN — Medication 40 MILLIEQUIVALENT(S): at 23:16

## 2017-03-08 RX ADMIN — CEFEPIME 100 MILLIGRAM(S): 1 INJECTION, POWDER, FOR SOLUTION INTRAMUSCULAR; INTRAVENOUS at 11:30

## 2017-03-08 NOTE — PROVIDER CONTACT NOTE (CRITICAL VALUE NOTIFICATION) - BACKGROUND
81 year old female patient with pmhx of HTN, T2DM, hypothyroidism, Alzheimer's sent to the ED from PMD with pain and swelling to the right leg x2 days.

## 2017-03-08 NOTE — H&P ADULT. - HISTORY OF PRESENT ILLNESS
81 y female with history of R renal mass admitted to [facility] on 3/3 for lower ext swelling and found to have a DVT 81 y female with history of R renal mass, R hydroureteronephrosis, and R atrophic kidney admitted to [facility] on 3/3 for lower ext swelling and found to have a DVT.    In addition, Pt was found to have UTI and underwent a R nephrostomy tube placement via IR on 3/7.  After Nephrostomy tube placement pt developed fevers and chills.  Pt transferred to [facility] under the care of Dr. Kurtz 81 y female was admitted to [facility] 2/13/17 for weakness, confusion, agitation. treated for sepsis 2/2 UTI. renal US showed R hydronephrosis w/ mass in R renal pelvis extending to R ureter. CT A/P showed R hydroureteronephrosis w/ obstruction at level of iliac vessels, heterogenous non-enhancing mass-like density at R renal pelvis may represent inflammatory mass, incidental phlegmon in R psoas muscle adjacent to renal pelvis detected. IR consulted, opted to defer drainage.  Pt had abnormal diuretic renal scan w/ decreased flow to and function of R kidney w/ evidence of obstruction. Pt recovered and was treated as an outpatient  w/ 14d course keflex for UTI w/ pan-sensitive Ecoli.  She and her family followed up with Dr. Kurtz and discussed potential options for the renal mass including placing a drain, a nephrectomy, and continuing treatment with antibiotics. While pt.and family  were seeking a second opinion she admitted to [facility] on 3/3 for lower ext swelling and found to have a DVT.    In addition, Pt was found to have UTI with persistent R hydronephrosis and underwent a R nephrostomy tube placement via IR on 3/7.  After Nephrostomy tube placement pt developed fevers and chills. Pt seen by Psych and started on risperidone and zyprexa.    Pt transferred to [facility] under the care of Dr. Kurtz.

## 2017-03-08 NOTE — PATIENT PROFILE ADULT. - PAIN COMMENT, PROFILE
patient has a history of dementia, pt currently confused and agitate, unable to assess acceptable pain level

## 2017-03-08 NOTE — H&P ADULT. - PMH
Alzheimer disease    HTN (hypertension)    Hypothyroid    Renal mass, right    T2DM (type 2 diabetes mellitus)

## 2017-03-09 LAB
BASOPHILS # BLD AUTO: 0.04 K/UL — SIGNIFICANT CHANGE UP (ref 0–0.2)
BASOPHILS NFR BLD AUTO: 0.4 % — SIGNIFICANT CHANGE UP (ref 0–2)
BUN SERPL-MCNC: 5 MG/DL — LOW (ref 7–23)
CALCIUM SERPL-MCNC: 8.9 MG/DL — SIGNIFICANT CHANGE UP (ref 8.4–10.5)
CHLORIDE SERPL-SCNC: 102 MMOL/L — SIGNIFICANT CHANGE UP (ref 98–107)
CO2 SERPL-SCNC: 25 MMOL/L — SIGNIFICANT CHANGE UP (ref 22–31)
CREAT SERPL-MCNC: 0.62 MG/DL — SIGNIFICANT CHANGE UP (ref 0.5–1.3)
CULTURE RESULTS: SIGNIFICANT CHANGE UP
EOSINOPHIL # BLD AUTO: 0.23 K/UL — SIGNIFICANT CHANGE UP (ref 0–0.5)
EOSINOPHIL NFR BLD AUTO: 2.1 % — SIGNIFICANT CHANGE UP (ref 0–6)
GLUCOSE SERPL-MCNC: 198 MG/DL — HIGH (ref 70–99)
HCT VFR BLD CALC: 29.9 % — LOW (ref 34.5–45)
HGB BLD-MCNC: 9.8 G/DL — LOW (ref 11.5–15.5)
IMM GRANULOCYTES NFR BLD AUTO: 0.5 % — SIGNIFICANT CHANGE UP (ref 0–1.5)
LYMPHOCYTES # BLD AUTO: 2.7 K/UL — SIGNIFICANT CHANGE UP (ref 1–3.3)
LYMPHOCYTES # BLD AUTO: 25 % — SIGNIFICANT CHANGE UP (ref 13–44)
MCHC RBC-ENTMCNC: 30 PG — SIGNIFICANT CHANGE UP (ref 27–34)
MCHC RBC-ENTMCNC: 32.8 % — SIGNIFICANT CHANGE UP (ref 32–36)
MCV RBC AUTO: 91.4 FL — SIGNIFICANT CHANGE UP (ref 80–100)
MONOCYTES # BLD AUTO: 0.71 K/UL — SIGNIFICANT CHANGE UP (ref 0–0.9)
MONOCYTES NFR BLD AUTO: 6.6 % — SIGNIFICANT CHANGE UP (ref 2–14)
NEUTROPHILS # BLD AUTO: 7.08 K/UL — SIGNIFICANT CHANGE UP (ref 1.8–7.4)
NEUTROPHILS NFR BLD AUTO: 65.4 % — SIGNIFICANT CHANGE UP (ref 43–77)
NON-GYNECOLOGICAL CYTOLOGY STUDY: SIGNIFICANT CHANGE UP
PLATELET # BLD AUTO: 585 K/UL — HIGH (ref 150–400)
PMV BLD: 10.4 FL — SIGNIFICANT CHANGE UP (ref 7–13)
POTASSIUM SERPL-MCNC: 3.3 MMOL/L — LOW (ref 3.5–5.3)
POTASSIUM SERPL-SCNC: 3.3 MMOL/L — LOW (ref 3.5–5.3)
RBC # BLD: 3.27 M/UL — LOW (ref 3.8–5.2)
RBC # FLD: 13.8 % — SIGNIFICANT CHANGE UP (ref 10.3–14.5)
SODIUM SERPL-SCNC: 140 MMOL/L — SIGNIFICANT CHANGE UP (ref 135–145)
SPECIMEN SOURCE: SIGNIFICANT CHANGE UP
WBC # BLD: 10.81 K/UL — HIGH (ref 3.8–10.5)
WBC # FLD AUTO: 10.81 K/UL — HIGH (ref 3.8–10.5)

## 2017-03-09 PROCEDURE — 99223 1ST HOSP IP/OBS HIGH 75: CPT

## 2017-03-09 PROCEDURE — 93010 ELECTROCARDIOGRAM REPORT: CPT

## 2017-03-09 PROCEDURE — 71010: CPT | Mod: 26

## 2017-03-09 RX ORDER — LACTOBACILLUS ACIDOPHILUS 100MM CELL
1 CAPSULE ORAL
Qty: 0 | Refills: 0 | Status: DISCONTINUED | OUTPATIENT
Start: 2017-03-09 | End: 2017-03-11

## 2017-03-09 RX ORDER — ENOXAPARIN SODIUM 100 MG/ML
100 INJECTION SUBCUTANEOUS ONCE
Qty: 0 | Refills: 0 | Status: DISCONTINUED | OUTPATIENT
Start: 2017-03-09 | End: 2017-03-09

## 2017-03-09 RX ORDER — POTASSIUM CHLORIDE 20 MEQ
40 PACKET (EA) ORAL ONCE
Qty: 0 | Refills: 0 | Status: COMPLETED | OUTPATIENT
Start: 2017-03-09 | End: 2017-03-09

## 2017-03-09 RX ORDER — HEPARIN SODIUM 5000 [USP'U]/ML
5000 INJECTION INTRAVENOUS; SUBCUTANEOUS EVERY 8 HOURS
Qty: 0 | Refills: 0 | Status: DISCONTINUED | OUTPATIENT
Start: 2017-03-09 | End: 2017-03-11

## 2017-03-09 RX ADMIN — Medication 100 MILLIEQUIVALENT(S): at 00:31

## 2017-03-09 RX ADMIN — Medication 250 MILLIGRAM(S): at 12:53

## 2017-03-09 RX ADMIN — FLUCONAZOLE 100 MILLIGRAM(S): 150 TABLET ORAL at 18:26

## 2017-03-09 RX ADMIN — DONEPEZIL HYDROCHLORIDE 5 MILLIGRAM(S): 10 TABLET, FILM COATED ORAL at 21:32

## 2017-03-09 RX ADMIN — PIPERACILLIN AND TAZOBACTAM 25 GRAM(S): 4; .5 INJECTION, POWDER, LYOPHILIZED, FOR SOLUTION INTRAVENOUS at 19:17

## 2017-03-09 RX ADMIN — Medication 1: at 08:49

## 2017-03-09 RX ADMIN — OLANZAPINE 2.5 MILLIGRAM(S): 15 TABLET, FILM COATED ORAL at 18:31

## 2017-03-09 RX ADMIN — PIPERACILLIN AND TAZOBACTAM 25 GRAM(S): 4; .5 INJECTION, POWDER, LYOPHILIZED, FOR SOLUTION INTRAVENOUS at 11:48

## 2017-03-09 RX ADMIN — RISPERIDONE 0.5 MILLIGRAM(S): 4 TABLET ORAL at 18:29

## 2017-03-09 RX ADMIN — Medication 40 MILLIEQUIVALENT(S): at 12:55

## 2017-03-09 RX ADMIN — PIPERACILLIN AND TAZOBACTAM 25 GRAM(S): 4; .5 INJECTION, POWDER, LYOPHILIZED, FOR SOLUTION INTRAVENOUS at 03:00

## 2017-03-09 RX ADMIN — Medication 100 MICROGRAM(S): at 08:18

## 2017-03-09 RX ADMIN — DEXTROSE MONOHYDRATE, SODIUM CHLORIDE, AND POTASSIUM CHLORIDE 100 MILLILITER(S): 50; .745; 4.5 INJECTION, SOLUTION INTRAVENOUS at 08:22

## 2017-03-10 LAB
APPEARANCE: ABNORMAL
BACTERIA UR CULT: NORMAL
BACTERIA: ABNORMAL
BILIRUBIN URINE: NEGATIVE
BLOOD URINE: ABNORMAL
COLOR: ABNORMAL
GLUCOSE QUALITATIVE U: NORMAL MG/DL
GRANULAR CASTS: 2 /LPF
HYALINE CASTS: 2 /LPF
KETONES URINE: NEGATIVE
LEUKOCYTE ESTERASE URINE: ABNORMAL
MICROSCOPIC-UA: NORMAL
NITRITE URINE: NEGATIVE
PH URINE: 5
PROTEIN URINE: 30 MG/DL
RED BLOOD CELLS URINE: 152 /HPF
SPECIFIC GRAVITY URINE: 1.03
SQUAMOUS EPITHELIAL CELLS: 17 /HPF
URINE COMMENTS: NORMAL
UROBILINOGEN URINE: NORMAL MG/DL
WHITE BLOOD CELLS URINE: >720 /HPF

## 2017-03-10 PROCEDURE — 99233 SBSQ HOSP IP/OBS HIGH 50: CPT

## 2017-03-10 PROCEDURE — 50693 PLMT URETERAL STENT PRQ: CPT | Mod: RT

## 2017-03-10 PROCEDURE — 99232 SBSQ HOSP IP/OBS MODERATE 35: CPT

## 2017-03-10 RX ORDER — RISPERIDONE 4 MG/1
1 TABLET ORAL
Qty: 0 | Refills: 0 | COMMUNITY
Start: 2017-03-10

## 2017-03-10 RX ORDER — CEPHALEXIN 500 MG
1 CAPSULE ORAL
Qty: 20 | Refills: 0 | OUTPATIENT
Start: 2017-03-10 | End: 2017-03-20

## 2017-03-10 RX ORDER — FLUCONAZOLE 150 MG/1
1 TABLET ORAL
Qty: 10 | Refills: 0 | OUTPATIENT
Start: 2017-03-10 | End: 2017-03-20

## 2017-03-10 RX ADMIN — PIPERACILLIN AND TAZOBACTAM 25 GRAM(S): 4; .5 INJECTION, POWDER, LYOPHILIZED, FOR SOLUTION INTRAVENOUS at 12:00

## 2017-03-10 RX ADMIN — HEPARIN SODIUM 5000 UNIT(S): 5000 INJECTION INTRAVENOUS; SUBCUTANEOUS at 22:58

## 2017-03-10 RX ADMIN — Medication 2: at 09:17

## 2017-03-10 RX ADMIN — Medication 100 MICROGRAM(S): at 08:02

## 2017-03-10 RX ADMIN — HEPARIN SODIUM 5000 UNIT(S): 5000 INJECTION INTRAVENOUS; SUBCUTANEOUS at 06:28

## 2017-03-10 RX ADMIN — OLANZAPINE 2.5 MILLIGRAM(S): 15 TABLET, FILM COATED ORAL at 14:54

## 2017-03-10 RX ADMIN — DEXTROSE MONOHYDRATE, SODIUM CHLORIDE, AND POTASSIUM CHLORIDE 100 MILLILITER(S): 50; .745; 4.5 INJECTION, SOLUTION INTRAVENOUS at 23:07

## 2017-03-10 RX ADMIN — DEXTROSE MONOHYDRATE, SODIUM CHLORIDE, AND POTASSIUM CHLORIDE 100 MILLILITER(S): 50; .745; 4.5 INJECTION, SOLUTION INTRAVENOUS at 09:17

## 2017-03-10 RX ADMIN — PIPERACILLIN AND TAZOBACTAM 25 GRAM(S): 4; .5 INJECTION, POWDER, LYOPHILIZED, FOR SOLUTION INTRAVENOUS at 02:32

## 2017-03-10 RX ADMIN — FLUCONAZOLE 100 MILLIGRAM(S): 150 TABLET ORAL at 18:45

## 2017-03-10 RX ADMIN — RISPERIDONE 0.5 MILLIGRAM(S): 4 TABLET ORAL at 08:03

## 2017-03-10 RX ADMIN — PIPERACILLIN AND TAZOBACTAM 25 GRAM(S): 4; .5 INJECTION, POWDER, LYOPHILIZED, FOR SOLUTION INTRAVENOUS at 18:46

## 2017-03-10 RX ADMIN — DONEPEZIL HYDROCHLORIDE 5 MILLIGRAM(S): 10 TABLET, FILM COATED ORAL at 22:59

## 2017-03-10 NOTE — DISCHARGE NOTE ADULT - CARE PROVIDER_API CALL
Kee Kurtz), Urology  [address] [place] [id number]  Phone: [phone]  Fax: [phone]

## 2017-03-10 NOTE — DISCHARGE NOTE ADULT - INSTRUCTIONS
notify MD of temp 101, chills, nausea/vomiting, or increased pain on R side  keep dressing clean and dry

## 2017-03-10 NOTE — DISCHARGE NOTE ADULT - CONDITIONS AT DISCHARGE
pt alert and oriented x2, pt agitated at times, VSS, afebrile, no acute distress noted, denies pain, pt resting comfortably between care, right flank dressing CDI and capped, MAD noted on right abdominal pannus, interdry applied, voiding adequate amounts of urine, tolerating diet

## 2017-03-10 NOTE — DISCHARGE NOTE ADULT - SECONDARY DIAGNOSIS.
Alzheimer's disease of other onset Hydronephrosis, right Type 2 diabetes mellitus with other skin complication Secondary hypertension

## 2017-03-10 NOTE — DIETITIAN INITIAL EVALUATION ADULT. - DIET TYPE
Glucerna Shake 8oz. 3x daily (will provide additional ~660kcals, 30g protein)/consistent carbohydrate (evening snack)/supplement (specify)

## 2017-03-10 NOTE — DISCHARGE NOTE ADULT - PLAN OF CARE
FOLLOW UP FOLLOW UP: Please follow up with Dr. Meza in 1-2 weeks.  Call [phone] to schedule/confirm your appointment.  Call or follow up sooner with fevers, chills, nausea, vomiting, increasing pain, or with other concerns.  You may wash the site of tube insertion with soap and water  ACTIVITY: Ambulate as tolerated. Continue home risperidal. You may wash tube site with soap and water. Continue home metformin and glipizide. Continue home Diovan.

## 2017-03-10 NOTE — DISCHARGE NOTE ADULT - HOSPITAL COURSE
81 y female was admitted to [facility] 2/13/17 for weakness, confusion, agitation. treated for sepsis 2/2 UTI. renal US showed R hydronephrosis w/ mass in R renal pelvis extending to R ureter. CT A/P showed R hydroureteronephrosis w/ obstruction at level of iliac vessels, heterogenous non-enhancing mass-like density at R renal pelvis may represent inflammatory mass, incidental phlegmon in R psoas muscle adjacent to renal pelvis detected. IR consulted, opted to defer drainage.  Pt had abnormal diuretic renal scan w/ decreased flow to and function of R kidney w/ evidence of obstruction. Pt recovered and was treated as an outpatient  w/ 14d course keflex for UTI w/ pan-sensitive Ecoli.  She and her family followed up with Dr. Kurtz and discussed potential options for the renal mass including placing a drain, a nephrectomy, and continuing treatment with antibiotics. While patient.and family  were seeking a second opinion she [facility]3 for lower extremity swelling and found to have a DVT.      In addition, Pt was found to have UTI with persistent R hydronephrosis and underwent a R nephrostomy tube placement via IR on 3/7.  After Nephrostomy tube placement pt developed fevers and chills. Pt seen by Psych and started on risperidone and Zyprexa.    Pt transferred to [facility] under the care of Dr. Kurtz.    The patient was taken by IR for internalization of her R nephrostomy tube. _The patient tolerated the procedure well. Postoperatively the patient was sent to the PACU. The patient was hemodynamically stable and was transferred to a surgical floor.The patient's pain was controlled by IV pain medications and then by PO pain medications. The patient was advanced to a regular diet and tolerated it well. The patient was placed on home medications.     At the time of discharge, the patient was hemodynamically stable, was tolerating PO diet, was voiding urine and passing stool, was ambulating, and was comfortable with adequate pain control. The patient was instructed to follow up with Dr. Meza within 1-2 weeks after discharge from the hospital. The patient/family felt comfortable with discharge. The patient was discharged with a prescription for Keflex and Diflucan x 10 days. The patient had no other issues.

## 2017-03-10 NOTE — DISCHARGE NOTE ADULT - PATIENT PORTAL LINK FT
“You can access the [facility] Patient Portal, offered by [facility], by registering with the following website: [email or web address]”

## 2017-03-10 NOTE — DIETITIAN INITIAL EVALUATION ADULT. - NS AS NUTRI INTERV MEALS SNACK
Other (specify)/1. When/if clinically indicated resume PO diet;          2. Suggest: PO diet rx: Consistent Carbohydrate (no snacks), Low Sodium; PO supplement: Glucerna Shake 8oz. 3x daily (will provide additional ~660kcals, 30g protein);              3. Monitor PO diet tolerance;               4. Monitor labs, weights, hydration status;/Diets modified for specific foods and ingredients

## 2017-03-10 NOTE — DIETITIAN INITIAL EVALUATION ADULT. - OTHER INFO
Nutrition Consult X hemoglobin A1C greater than 7%. Pt 80 yo female asleep @ time of visit. Family members @ bed side. Per family Pt's appetite/PO intake poor for almost a year. Family also stated sometimes Pt forgets to swallow her food. Of note Pt with history of Alzheimer's.  Food preferences discussed with family. Pt's diet order includes Glucerna shake - TID. RDN recommended Pt's family to encourage Pt to drink Glucerna shake 3 cans daily as ordered. No chew/swallow problem voiced; no nausea/vomiting/diarrhea reported @ present. Pt's HbA1c level 8.6% (2/13) noted. Consistent Carbohydrate diet discussed with family including better food choices, foods to avoid; RDN answered questions/concerns related to diet. Written materials on diet also provided. RDN remains available, family made aware.

## 2017-03-10 NOTE — DISCHARGE NOTE ADULT - MEDICATION SUMMARY - MEDICATIONS TO TAKE
I will START or STAY ON the medications listed below when I get home from the hospital:    aspirin 81 mg oral delayed release tablet  -- 1 tab(s) by mouth once a day  -- Indication: For CAD    Diovan 80 mg oral tablet  -- 1 tab(s) by mouth once a day  -- Indication: For HTN    metFORMIN 1000 mg oral tablet, extended release  -- 1 tab(s) by mouth once a day  -- Indication: For DM    glipiZIDE 2.5 mg oral tablet, extended release  -- 1 tab(s) by mouth once a day  -- Indication: For DM    Diflucan 200 mg oral tablet  -- 1 tab(s) by mouth once a day  -- Do not take this drug if you are pregnant.  Finish all this medication unless otherwise directed by prescriber.    -- Indication: For UTI (urinary tract infection)    risperiDONE 0.5 mg oral tablet  -- 1 tab(s) by mouth 2 times a day  -- Indication: For ALZHEIMER'S    risperiDONE 0.5 mg oral tablet  -- 1 tab(s) by mouth every 6 hours, As needed, Agitation  -- Indication: For ALZHEIMER'S    alendronate 70 mg oral tablet  -- 1 tab(s) by mouth once a week  -- Indication: For OSTEOPENIA    Keflex 500 mg oral capsule  -- 1 cap(s) by mouth 2 times a day MDD:two tablets daily  -- Finish all this medication unless otherwise directed by prescriber.    -- Indication: For UTI (urinary tract infection)    donepezil 5 mg oral tablet  -- 1 tab(s) by mouth once a day (at bedtime)  -- Indication: For ALZHEIMER'S    Synthroid 100 mcg (0.1 mg) oral tablet  -- 1 tab(s) by mouth once a day  -- Indication: For HyPOTHYROIDISM    Os-Esdras Calcium+D3 oral tablet  -- 1 tab(s) by mouth 3 times a day  -- Indication: For SUPPLEMENT

## 2017-03-10 NOTE — DISCHARGE NOTE ADULT - CARE PLAN
Principal Discharge DX:	Urinary tract infection associated with catheterization of urinary tract, unspecified indwelling urinary catheter type, subsequent encounter  Goal:	FOLLOW UP  Instructions for follow-up, activity and diet:	FOLLOW UP: Please follow up with Dr. Meza in 1-2 weeks.  Call [phone] to schedule/confirm your appointment.  Call or follow up sooner with fevers, chills, nausea, vomiting, increasing pain, or with other concerns.  You may wash the site of tube insertion with soap and water  ACTIVITY: Ambulate as tolerated.  Secondary Diagnosis:	Alzheimer's disease of other onset  Instructions for follow-up, activity and diet:	Continue home risperidal.  Secondary Diagnosis:	Hydronephrosis, right  Instructions for follow-up, activity and diet:	You may wash tube site with soap and water.  Secondary Diagnosis:	Type 2 diabetes mellitus with other skin complication  Instructions for follow-up, activity and diet:	Continue home metformin and glipizide.  Secondary Diagnosis:	Secondary hypertension  Instructions for follow-up, activity and diet:	Continue home Diovan. Principal Discharge DX:	Urinary tract infection associated with catheterization of urinary tract, unspecified indwelling urinary catheter type, subsequent encounter  Goal:	FOLLOW UP  Instructions for follow-up, activity and diet:	FOLLOW UP: Please follow up with Dr. Meza in 1-2 weeks.  Call [phone] to schedule/confirm your appointment.  Call or follow up sooner with fevers, chills, nausea, vomiting, increasing pain, or with other concerns.  You may wash the site of tube insertion with soap and water  ACTIVITY: Ambulate as tolerated.  Secondary Diagnosis:	Alzheimer's disease of other onset  Instructions for follow-up, activity and diet:	Continue home risperidal.  Secondary Diagnosis:	Hydronephrosis, right  Instructions for follow-up, activity and diet:	You may wash tube site with soap and water.  Secondary Diagnosis:	Type 2 diabetes mellitus with other skin complication  Instructions for follow-up, activity and diet:	Continue home metformin and glipizide.  Secondary Diagnosis:	Secondary hypertension  Instructions for follow-up, activity and diet:	Continue home Diovan. Principal Discharge DX:	Urinary tract infection associated with catheterization of urinary tract, unspecified indwelling urinary catheter type, subsequent encounter  Goal:	FOLLOW UP  Instructions for follow-up, activity and diet:	FOLLOW UP: Please follow up with Dr. Meza in 1-2 weeks.  Call [phone] to schedule/confirm your appointment.  Call or follow up sooner with fevers, chills, nausea, vomiting, increasing pain, or with other concerns.  You may wash the site of tube insertion with soap and water  ACTIVITY: Ambulate as tolerated.  Secondary Diagnosis:	Alzheimer's disease of other onset  Instructions for follow-up, activity and diet:	Continue home risperidal.  Secondary Diagnosis:	Hydronephrosis, right  Instructions for follow-up, activity and diet:	You may wash tube site with soap and water.  Secondary Diagnosis:	Type 2 diabetes mellitus with other skin complication  Instructions for follow-up, activity and diet:	Continue home metformin and glipizide.  Secondary Diagnosis:	Secondary hypertension  Instructions for follow-up, activity and diet:	Continue home Diovan. Principal Discharge DX:	Urinary tract infection associated with catheterization of urinary tract, unspecified indwelling urinary catheter type, subsequent encounter  Goal:	FOLLOW UP  Instructions for follow-up, activity and diet:	FOLLOW UP: Please follow up with Dr. Meza in 1-2 weeks.  Call [phone] to schedule/confirm your appointment.  Call or follow up sooner with fevers, chills, nausea, vomiting, increasing pain, or with other concerns.  You may wash the site of tube insertion with soap and water  ACTIVITY: Ambulate as tolerated.  Secondary Diagnosis:	Alzheimer's disease of other onset  Instructions for follow-up, activity and diet:	Continue home risperidal.  Secondary Diagnosis:	Hydronephrosis, right  Instructions for follow-up, activity and diet:	You may wash tube site with soap and water.  Secondary Diagnosis:	Type 2 diabetes mellitus with other skin complication  Instructions for follow-up, activity and diet:	Continue home metformin and glipizide.  Secondary Diagnosis:	Secondary hypertension  Instructions for follow-up, activity and diet:	Continue home Diovan. Principal Discharge DX:	Urinary tract infection associated with catheterization of urinary tract, unspecified indwelling urinary catheter type, subsequent encounter  Goal:	FOLLOW UP  Instructions for follow-up, activity and diet:	FOLLOW UP: Please follow up with Dr. Meza in 1-2 weeks.  Call [phone] to schedule/confirm your appointment.  Call or follow up sooner with fevers, chills, nausea, vomiting, increasing pain, or with other concerns.  You may wash the site of tube insertion with soap and water  ACTIVITY: Ambulate as tolerated.  Secondary Diagnosis:	Alzheimer's disease of other onset  Instructions for follow-up, activity and diet:	Continue home risperidal.  Secondary Diagnosis:	Hydronephrosis, right  Instructions for follow-up, activity and diet:	You may wash tube site with soap and water.  Secondary Diagnosis:	Type 2 diabetes mellitus with other skin complication  Instructions for follow-up, activity and diet:	Continue home metformin and glipizide.  Secondary Diagnosis:	Secondary hypertension  Instructions for follow-up, activity and diet:	Continue home Diovan.

## 2017-03-10 NOTE — PROVIDER CONTACT NOTE (MEDICATION) - ASSESSMENT
Patient is currently sleeping, hx of alzheimers  Patient is combative and uncooperative, patient family requesting to let patient sleep and check blood glucose when patient awakes

## 2017-03-10 NOTE — DIETITIAN INITIAL EVALUATION ADULT. - PERTINENT LABORATORY DATA
(3/9) WBC 10.81 H, H/H 9.8/29.9 H,  H, K 3.3 L, BUN 5 L, Glu 198 H;    (2/13) HbA1c 8.6% H;      (2/12) Albumin 3.2 L

## 2017-03-10 NOTE — DISCHARGE NOTE ADULT - CARE PROVIDERS DIRECT ADDRESSES
[email or web address]

## 2017-03-11 ENCOUNTER — MOBILE ON CALL (OUTPATIENT)
Age: 82
End: 2017-03-11

## 2017-03-11 ENCOUNTER — CLINICAL ADVICE (OUTPATIENT)
Age: 82
End: 2017-03-11

## 2017-03-11 VITALS
OXYGEN SATURATION: 94 % | RESPIRATION RATE: 17 BRPM | HEART RATE: 66 BPM | SYSTOLIC BLOOD PRESSURE: 122 MMHG | DIASTOLIC BLOOD PRESSURE: 65 MMHG | TEMPERATURE: 97 F

## 2017-03-11 PROCEDURE — 99233 SBSQ HOSP IP/OBS HIGH 50: CPT

## 2017-03-11 RX ORDER — RISPERIDONE 4 MG/1
1 TABLET ORAL
Qty: 60 | Refills: 0 | OUTPATIENT
Start: 2017-03-11 | End: 2017-04-10

## 2017-03-11 RX ORDER — APIXABAN 2.5 MG/1
1 TABLET, FILM COATED ORAL
Qty: 60 | Refills: 0 | OUTPATIENT
Start: 2017-03-11 | End: 2017-04-10

## 2017-03-11 RX ADMIN — Medication 100 MICROGRAM(S): at 07:10

## 2017-03-11 RX ADMIN — RISPERIDONE 0.5 MILLIGRAM(S): 4 TABLET ORAL at 07:10

## 2017-03-11 RX ADMIN — Medication 1: at 08:54

## 2017-03-11 RX ADMIN — PIPERACILLIN AND TAZOBACTAM 25 GRAM(S): 4; .5 INJECTION, POWDER, LYOPHILIZED, FOR SOLUTION INTRAVENOUS at 03:02

## 2017-03-11 RX ADMIN — HEPARIN SODIUM 5000 UNIT(S): 5000 INJECTION INTRAVENOUS; SUBCUTANEOUS at 07:10

## 2017-03-11 RX ADMIN — Medication 1 TABLET(S): at 07:11

## 2017-03-11 RX ADMIN — HEPARIN SODIUM 5000 UNIT(S): 5000 INJECTION INTRAVENOUS; SUBCUTANEOUS at 13:14

## 2017-03-11 RX ADMIN — Medication 1: at 13:14

## 2017-03-12 ENCOUNTER — MESSAGE (OUTPATIENT)
Age: 82
End: 2017-03-12

## 2017-03-12 LAB
CULTURE RESULTS: SIGNIFICANT CHANGE UP
CULTURE RESULTS: SIGNIFICANT CHANGE UP
SPECIMEN SOURCE: SIGNIFICANT CHANGE UP
SPECIMEN SOURCE: SIGNIFICANT CHANGE UP

## 2017-03-13 ENCOUNTER — APPOINTMENT (OUTPATIENT)
Dept: INTERNAL MEDICINE | Facility: CLINIC | Age: 82
End: 2017-03-13

## 2017-03-13 VITALS — HEART RATE: 70 BPM | SYSTOLIC BLOOD PRESSURE: 138 MMHG | DIASTOLIC BLOOD PRESSURE: 70 MMHG

## 2017-03-13 PROBLEM — N28.89 OTHER SPECIFIED DISORDERS OF KIDNEY AND URETER: Chronic | Status: ACTIVE | Noted: 2017-03-03

## 2017-03-13 PROBLEM — I10 ESSENTIAL (PRIMARY) HYPERTENSION: Chronic | Status: ACTIVE | Noted: 2017-03-03

## 2017-03-13 PROBLEM — G30.8 OTHER ALZHEIMER'S DISEASE: Chronic | Status: ACTIVE | Noted: 2017-03-03

## 2017-03-13 PROBLEM — E11.9 TYPE 2 DIABETES MELLITUS WITHOUT COMPLICATIONS: Chronic | Status: ACTIVE | Noted: 2017-03-03

## 2017-03-16 ENCOUNTER — RX RENEWAL (OUTPATIENT)
Age: 82
End: 2017-03-16

## 2017-03-16 ENCOUNTER — APPOINTMENT (OUTPATIENT)
Dept: UROLOGY | Facility: CLINIC | Age: 82
End: 2017-03-16

## 2017-03-27 ENCOUNTER — APPOINTMENT (OUTPATIENT)
Dept: INTERNAL MEDICINE | Facility: CLINIC | Age: 82
End: 2017-03-27

## 2017-03-27 DIAGNOSIS — D64.9 ANEMIA, UNSPECIFIED: ICD-10-CM

## 2017-03-27 DIAGNOSIS — J06.9 ACUTE UPPER RESPIRATORY INFECTION, UNSPECIFIED: ICD-10-CM

## 2017-03-29 ENCOUNTER — FORM ENCOUNTER (OUTPATIENT)
Age: 82
End: 2017-03-29

## 2017-03-29 PROBLEM — D64.9 ANEMIA: Status: ACTIVE | Noted: 2017-03-29

## 2017-03-30 ENCOUNTER — OUTPATIENT (OUTPATIENT)
Dept: OUTPATIENT SERVICES | Facility: HOSPITAL | Age: 82
LOS: 1 days | End: 2017-03-30
Payer: MEDICAID

## 2017-03-30 ENCOUNTER — APPOINTMENT (OUTPATIENT)
Dept: RADIOLOGY | Facility: IMAGING CENTER | Age: 82
End: 2017-03-30

## 2017-03-30 DIAGNOSIS — Z87.442 PERSONAL HISTORY OF URINARY CALCULI: Chronic | ICD-10-CM

## 2017-03-30 DIAGNOSIS — Z90.49 ACQUIRED ABSENCE OF OTHER SPECIFIED PARTS OF DIGESTIVE TRACT: Chronic | ICD-10-CM

## 2017-03-30 DIAGNOSIS — K80.20 CALCULUS OF GALLBLADDER WITHOUT CHOLECYSTITIS WITHOUT OBSTRUCTION: Chronic | ICD-10-CM

## 2017-03-30 DIAGNOSIS — Z00.8 ENCOUNTER FOR OTHER GENERAL EXAMINATION: ICD-10-CM

## 2017-03-30 PROCEDURE — 71046 X-RAY EXAM CHEST 2 VIEWS: CPT

## 2017-03-31 ENCOUNTER — MEDICATION RENEWAL (OUTPATIENT)
Age: 82
End: 2017-03-31

## 2017-03-31 LAB
ALBUMIN SERPL ELPH-MCNC: 3.8 G/DL
ALP BLD-CCNC: 134 U/L
ALT SERPL-CCNC: 13 U/L
ANION GAP SERPL CALC-SCNC: 19 MMOL/L
AST SERPL-CCNC: 17 U/L
BASOPHILS # BLD AUTO: 0.07 K/UL
BASOPHILS NFR BLD AUTO: 0.6 %
BILIRUB SERPL-MCNC: 0.3 MG/DL
BUN SERPL-MCNC: 21 MG/DL
CALCIUM SERPL-MCNC: 10.8 MG/DL
CHLORIDE SERPL-SCNC: 95 MMOL/L
CO2 SERPL-SCNC: 22 MMOL/L
CREAT SERPL-MCNC: 0.77 MG/DL
EOSINOPHIL # BLD AUTO: 0.52 K/UL
EOSINOPHIL NFR BLD AUTO: 4.2 %
GLUCOSE SERPL-MCNC: 222 MG/DL
HCT VFR BLD CALC: 34.6 %
HGB BLD-MCNC: 10.8 G/DL
IMM GRANULOCYTES NFR BLD AUTO: 0.2 %
LYMPHOCYTES # BLD AUTO: 4.37 K/UL
LYMPHOCYTES NFR BLD AUTO: 35.3 %
MAN DIFF?: NORMAL
MCHC RBC-ENTMCNC: 29.3 PG
MCHC RBC-ENTMCNC: 31.2 GM/DL
MCV RBC AUTO: 93.8 FL
MONOCYTES # BLD AUTO: 1.23 K/UL
MONOCYTES NFR BLD AUTO: 9.9 %
NEUTROPHILS # BLD AUTO: 6.16 K/UL
NEUTROPHILS NFR BLD AUTO: 49.8 %
PLATELET # BLD AUTO: 805 K/UL
POTASSIUM SERPL-SCNC: 4.8 MMOL/L
PROT SERPL-MCNC: 8.5 G/DL
RBC # BLD: 3.69 M/UL
RBC # FLD: 15.4 %
SODIUM SERPL-SCNC: 136 MMOL/L
TSH SERPL-ACNC: 0.4 UIU/ML
WBC # FLD AUTO: 12.38 K/UL

## 2017-03-31 RX ORDER — METFORMIN HYDROCHLORIDE 500 MG/5ML
500 SOLUTION ORAL TWICE DAILY
Qty: 1 | Refills: 3 | Status: ACTIVE | COMMUNITY
Start: 2017-03-31 | End: 1900-01-01

## 2017-04-03 ENCOUNTER — RESULT REVIEW (OUTPATIENT)
Age: 82
End: 2017-04-03

## 2017-04-03 LAB — BACTERIA UR CULT: ABNORMAL

## 2017-04-04 ENCOUNTER — FORM ENCOUNTER (OUTPATIENT)
Age: 82
End: 2017-04-04

## 2017-04-05 ENCOUNTER — APPOINTMENT (OUTPATIENT)
Dept: UROLOGY | Facility: CLINIC | Age: 82
End: 2017-04-05

## 2017-04-05 ENCOUNTER — APPOINTMENT (OUTPATIENT)
Dept: RADIOLOGY | Facility: IMAGING CENTER | Age: 82
End: 2017-04-05

## 2017-04-05 ENCOUNTER — INPATIENT (INPATIENT)
Facility: HOSPITAL | Age: 82
LOS: 2 days | Discharge: HOME HEALTH | End: 2017-04-08
Attending: UROLOGY | Admitting: UROLOGY
Payer: MEDICAID

## 2017-04-05 ENCOUNTER — RX RENEWAL (OUTPATIENT)
Age: 82
End: 2017-04-05

## 2017-04-05 ENCOUNTER — OUTPATIENT (OUTPATIENT)
Dept: OUTPATIENT SERVICES | Facility: HOSPITAL | Age: 82
LOS: 1 days | End: 2017-04-05
Payer: MEDICAID

## 2017-04-05 VITALS
DIASTOLIC BLOOD PRESSURE: 56 MMHG | SYSTOLIC BLOOD PRESSURE: 90 MMHG | OXYGEN SATURATION: 96 % | HEART RATE: 83 BPM | RESPIRATION RATE: 19 BRPM

## 2017-04-05 DIAGNOSIS — Z90.49 ACQUIRED ABSENCE OF OTHER SPECIFIED PARTS OF DIGESTIVE TRACT: Chronic | ICD-10-CM

## 2017-04-05 DIAGNOSIS — T83.022A DISPLACEMENT OF NEPHROSTOMY CATHETER, INITIAL ENCOUNTER: ICD-10-CM

## 2017-04-05 DIAGNOSIS — N13.30 UNSPECIFIED HYDRONEPHROSIS: ICD-10-CM

## 2017-04-05 DIAGNOSIS — Z87.442 PERSONAL HISTORY OF URINARY CALCULI: Chronic | ICD-10-CM

## 2017-04-05 DIAGNOSIS — K80.20 CALCULUS OF GALLBLADDER WITHOUT CHOLECYSTITIS WITHOUT OBSTRUCTION: Chronic | ICD-10-CM

## 2017-04-05 PROBLEM — G30.9 ALZHEIMER'S DISEASE, UNSPECIFIED: Chronic | Status: ACTIVE | Noted: 2017-02-12

## 2017-04-05 PROBLEM — I10 ESSENTIAL (PRIMARY) HYPERTENSION: Chronic | Status: ACTIVE | Noted: 2017-02-12

## 2017-04-05 PROBLEM — E03.9 HYPOTHYROIDISM, UNSPECIFIED: Chronic | Status: ACTIVE | Noted: 2017-02-12

## 2017-04-05 PROBLEM — E11.9 TYPE 2 DIABETES MELLITUS WITHOUT COMPLICATIONS: Chronic | Status: ACTIVE | Noted: 2017-02-12

## 2017-04-05 LAB
ALBUMIN SERPL ELPH-MCNC: 3.3 G/DL — SIGNIFICANT CHANGE UP (ref 3.3–5)
ALP SERPL-CCNC: 87 U/L — SIGNIFICANT CHANGE UP (ref 40–120)
ALT FLD-CCNC: 16 U/L — SIGNIFICANT CHANGE UP (ref 4–33)
APTT BLD: 35 SEC — SIGNIFICANT CHANGE UP (ref 27.5–37.4)
AST SERPL-CCNC: 18 U/L — SIGNIFICANT CHANGE UP (ref 4–32)
BASE EXCESS BLDV CALC-SCNC: 3.9 MMOL/L — SIGNIFICANT CHANGE UP
BASOPHILS # BLD AUTO: 0.07 K/UL — SIGNIFICANT CHANGE UP (ref 0–0.2)
BASOPHILS NFR BLD AUTO: 0.5 % — SIGNIFICANT CHANGE UP (ref 0–2)
BILIRUB SERPL-MCNC: 0.2 MG/DL — SIGNIFICANT CHANGE UP (ref 0.2–1.2)
BLD GP AB SCN SERPL QL: NEGATIVE — SIGNIFICANT CHANGE UP
BLOOD GAS VENOUS - CREATININE: 0.74 MG/DL — SIGNIFICANT CHANGE UP (ref 0.5–1.3)
BUN SERPL-MCNC: 35 MG/DL — HIGH (ref 7–23)
CALCIUM SERPL-MCNC: 9.8 MG/DL — SIGNIFICANT CHANGE UP (ref 8.4–10.5)
CHLORIDE BLDV-SCNC: 103 MMOL/L — SIGNIFICANT CHANGE UP (ref 96–108)
CHLORIDE SERPL-SCNC: 96 MMOL/L — LOW (ref 98–107)
CO2 SERPL-SCNC: 23 MMOL/L — SIGNIFICANT CHANGE UP (ref 22–31)
CREAT SERPL-MCNC: 0.88 MG/DL — SIGNIFICANT CHANGE UP (ref 0.5–1.3)
EOSINOPHIL # BLD AUTO: 0.41 K/UL — SIGNIFICANT CHANGE UP (ref 0–0.5)
EOSINOPHIL NFR BLD AUTO: 3 % — SIGNIFICANT CHANGE UP (ref 0–6)
GAS PNL BLDV: 131 MMOL/L — LOW (ref 136–146)
GLUCOSE BLDV-MCNC: 169 — HIGH (ref 70–99)
GLUCOSE SERPL-MCNC: 185 MG/DL — HIGH (ref 70–99)
HCO3 BLDV-SCNC: 27 MMOL/L — SIGNIFICANT CHANGE UP (ref 20–27)
HCT VFR BLD CALC: 30.6 % — LOW (ref 34.5–45)
HCT VFR BLDV CALC: 30 % — LOW (ref 34.5–45)
HGB BLD-MCNC: 9.9 G/DL — LOW (ref 11.5–15.5)
HGB BLDV-MCNC: 9.7 G/DL — LOW (ref 11.5–15.5)
IMM GRANULOCYTES NFR BLD AUTO: 0.2 % — SIGNIFICANT CHANGE UP (ref 0–1.5)
INR BLD: 1.23 — HIGH (ref 0.88–1.17)
LACTATE BLDV-MCNC: 1.7 MMOL/L — SIGNIFICANT CHANGE UP (ref 0.5–2)
LYMPHOCYTES # BLD AUTO: 46.7 % — HIGH (ref 13–44)
LYMPHOCYTES # BLD AUTO: 6.43 K/UL — HIGH (ref 1–3.3)
MANUAL SMEAR VERIFICATION: SIGNIFICANT CHANGE UP
MCHC RBC-ENTMCNC: 29.6 PG — SIGNIFICANT CHANGE UP (ref 27–34)
MCHC RBC-ENTMCNC: 32.4 % — SIGNIFICANT CHANGE UP (ref 32–36)
MCV RBC AUTO: 91.6 FL — SIGNIFICANT CHANGE UP (ref 80–100)
MONOCYTES # BLD AUTO: 1.03 K/UL — HIGH (ref 0–0.9)
MONOCYTES NFR BLD AUTO: 7.5 % — SIGNIFICANT CHANGE UP (ref 2–14)
MORPHOLOGY BLD-IMP: NORMAL — SIGNIFICANT CHANGE UP
NEUTROPHILS # BLD AUTO: 5.8 K/UL — SIGNIFICANT CHANGE UP (ref 1.8–7.4)
NEUTROPHILS NFR BLD AUTO: 42.1 % — LOW (ref 43–77)
PCO2 BLDV: 49 MMHG — SIGNIFICANT CHANGE UP (ref 41–51)
PH BLDV: 7.39 PH — SIGNIFICANT CHANGE UP (ref 7.32–7.43)
PLATELET # BLD AUTO: 599 K/UL — HIGH (ref 150–400)
PLATELET COUNT - ESTIMATE: SIGNIFICANT CHANGE UP
PMV BLD: 10.6 FL — SIGNIFICANT CHANGE UP (ref 7–13)
PO2 BLDV: 36 MMHG — SIGNIFICANT CHANGE UP (ref 35–40)
POTASSIUM BLDV-SCNC: 6.7 MMOL/L — CRITICAL HIGH (ref 3.4–4.5)
POTASSIUM SERPL-MCNC: 4.3 MMOL/L — SIGNIFICANT CHANGE UP (ref 3.5–5.3)
POTASSIUM SERPL-SCNC: 4.3 MMOL/L — SIGNIFICANT CHANGE UP (ref 3.5–5.3)
PROT SERPL-MCNC: 8 G/DL — SIGNIFICANT CHANGE UP (ref 6–8.3)
PROTHROM AB SERPL-ACNC: 13.8 SEC — HIGH (ref 9.8–13.1)
RBC # BLD: 3.34 M/UL — LOW (ref 3.8–5.2)
RBC # FLD: 15 % — HIGH (ref 10.3–14.5)
RH IG SCN BLD-IMP: POSITIVE — SIGNIFICANT CHANGE UP
RH IG SCN BLD-IMP: POSITIVE — SIGNIFICANT CHANGE UP
SAO2 % BLDV: 60 % — SIGNIFICANT CHANGE UP (ref 60–85)
SODIUM SERPL-SCNC: 137 MMOL/L — SIGNIFICANT CHANGE UP (ref 135–145)
WBC # BLD: 13.77 K/UL — HIGH (ref 3.8–10.5)
WBC # FLD AUTO: 13.77 K/UL — HIGH (ref 3.8–10.5)

## 2017-04-05 PROCEDURE — 74018 RADEX ABDOMEN 1 VIEW: CPT

## 2017-04-05 RX ORDER — DONEPEZIL HYDROCHLORIDE 10 MG/1
5 TABLET, FILM COATED ORAL AT BEDTIME
Refills: 0 | Status: DISCONTINUED | OUTPATIENT
Start: 2017-04-05 | End: 2017-04-07

## 2017-04-05 RX ORDER — RISPERIDONE 4 MG/1
0.5 TABLET ORAL EVERY 6 HOURS
Refills: 0 | Status: DISCONTINUED | OUTPATIENT
Start: 2017-04-05 | End: 2017-04-07

## 2017-04-05 RX ORDER — IBUPROFEN 200 MG
400 TABLET ORAL ONCE
Refills: 0 | Status: COMPLETED | OUTPATIENT
Start: 2017-04-05 | End: 2017-04-05

## 2017-04-05 RX ORDER — LEVOTHYROXINE SODIUM 125 MCG
100 TABLET ORAL DAILY
Refills: 0 | Status: DISCONTINUED | OUTPATIENT
Start: 2017-04-05 | End: 2017-04-07

## 2017-04-05 RX ORDER — SODIUM CHLORIDE 9 MG/ML
1000 INJECTION, SOLUTION INTRAVENOUS
Refills: 0 | Status: DISCONTINUED | OUTPATIENT
Start: 2017-04-05 | End: 2017-04-06

## 2017-04-05 RX ORDER — CEFTRIAXONE 500 MG/1
1 INJECTION, POWDER, FOR SOLUTION INTRAMUSCULAR; INTRAVENOUS EVERY 24 HOURS
Refills: 0 | Status: DISCONTINUED | OUTPATIENT
Start: 2017-04-05 | End: 2017-04-08

## 2017-04-05 RX ORDER — RISPERIDONE 4 MG/1
0.5 TABLET ORAL
Refills: 0 | Status: DISCONTINUED | OUTPATIENT
Start: 2017-04-05 | End: 2017-04-07

## 2017-04-05 RX ORDER — INSULIN LISPRO 100/ML
VIAL (ML) SUBCUTANEOUS
Refills: 0 | Status: DISCONTINUED | OUTPATIENT
Start: 2017-04-05 | End: 2017-04-08

## 2017-04-05 RX ORDER — VALSARTAN 80 MG/1
80 TABLET ORAL DAILY
Refills: 0 | Status: DISCONTINUED | OUTPATIENT
Start: 2017-04-05 | End: 2017-04-06

## 2017-04-05 RX ORDER — ASPIRIN/CALCIUM CARB/MAGNESIUM 324 MG
81 TABLET ORAL DAILY
Refills: 0 | Status: DISCONTINUED | OUTPATIENT
Start: 2017-04-05 | End: 2017-04-06

## 2017-04-05 RX ORDER — INSULIN LISPRO 100/ML
VIAL (ML) SUBCUTANEOUS AT BEDTIME
Refills: 0 | Status: DISCONTINUED | OUTPATIENT
Start: 2017-04-05 | End: 2017-04-08

## 2017-04-05 RX ORDER — HEPARIN SODIUM 5000 [USP'U]/ML
5000 INJECTION INTRAVENOUS; SUBCUTANEOUS EVERY 8 HOURS
Refills: 0 | Status: DISCONTINUED | OUTPATIENT
Start: 2017-04-05 | End: 2017-04-08

## 2017-04-05 RX ADMIN — Medication 400 MILLIGRAM(S): at 18:07

## 2017-04-05 RX ADMIN — Medication 400 MILLIGRAM(S): at 19:30

## 2017-04-05 RX ADMIN — Medication 1 TABLET(S): at 21:37

## 2017-04-05 RX ADMIN — CEFTRIAXONE 100 GRAM(S): 500 INJECTION, POWDER, FOR SOLUTION INTRAMUSCULAR; INTRAVENOUS at 18:07

## 2017-04-05 RX ADMIN — RISPERIDONE 0.5 MILLIGRAM(S): 4 TABLET ORAL at 20:22

## 2017-04-05 NOTE — ED PROVIDER NOTE - GASTROINTESTINAL NEGATIVE STATEMENT, MLM
+nephrostomy tube, no abdominal pain, no bloating, no constipation, no diarrhea, no nausea and no vomiting.

## 2017-04-05 NOTE — ED PROVIDER NOTE - OBJECTIVE STATEMENT
82 yo F pmhx of DM, HTN, dementia, nephrostomy here for dislodged nephrostomy. Pt with family at baseline. At baseline pt minimally verbal. Family giving history, three weeks ago went to [facility] and was found to be uroseptic with an obstruction and stent was placed on R side with R nephrostomy. Came to ED today because pt pulled out nephrostomy tube at home today. In ED urology at bedside. Of note patient dx'ed with UTI and currently on Vantin. Denies fever, vomiting, diarrhea, cough, SOB, CP as per family. Of note, family mentions that patient is confused AOx0 at baseline 2/2 to her dementia and that nothing has change there,  not any more confused than baseline.

## 2017-04-05 NOTE — ED PROVIDER NOTE - ATTENDING CONTRIBUTION TO CARE
attest AJM: Patient seen with PA and agree with above note. 81 year old female sent in for replacement of nephrostomy tube. + UTI on abx. Seen by uro at bedside who will admit to their service.

## 2017-04-05 NOTE — ED PROVIDER NOTE - MEDICAL DECISION MAKING DETAILS
80 yo F here for dislodged nephrostomy, seen by urology in ED. Plan for basic labs, coags, CT pending Cr/BUN. Admit to viro for replacement of nephrostomy.

## 2017-04-05 NOTE — H&P ADULT. - HISTORY OF PRESENT ILLNESS
81F with dementia recently admitted to [facility] and [facility] with R hydro and obstruction in the setting of infection. Was treated with nephrostomy tube placement. Pt. has been doing well since then. This past weekend began having pain from NT site. NO feves, chills, nausea, vomiting. Pt. was altered/worsened dementia during acute infection, that is not happening currently. She had some pain secondary to constipation as well, for which the family is giving miralax. Pt. comes in today b/c family noted that there was no output from nephrostomy. ON exam, the nephrostomy appears dislodged with the first proximal perforation on the cope loop outside the body. Pt. is voiding without issues.     Patient is currently experiencing no nausea and no anorexia flank pain, but no urinary incontinence, no dysuria and no fever. Pain Level: 5 Pain is located in the right flank.     KUB obtained prior to admission revealed partially dislodged nephrostomy tube

## 2017-04-05 NOTE — ED ADULT NURSE NOTE - OBJECTIVE STATEMENT
pt not oriented and alert, ambulatory with assist, came here for pulling out her nephrostomy tube yesterday. Also c/o moderate abdominal pain. PMH of Alzheimer. Pt is confusing at her baseline. Especially in hospital pt getting more aggressive. Pending evaluation by provider. pt's family member refused blood works until MD evaluates the pt. Awaiting further study.

## 2017-04-05 NOTE — ED PROVIDER NOTE - CHPI ED SYMPTOMS NEG
no abdominal distension/no blood in stool/no diarrhea/no dysuria/no fever/no hematuria/no nausea/no vomiting/no burning urination/no chills

## 2017-04-05 NOTE — H&P ADULT. - PMH
Diabetes    Hyperlipidemia    Hypertension    Hypothyroid Alzheimer disease    Diabetes    Hyperlipidemia    Hypertension    Hypothyroid    Renal mass

## 2017-04-05 NOTE — H&P ADULT. - ASSESSMENT
81yF with dementia and R nephrostomy for obstuction, now with dislodged nephrostomy  - CBC, BMP, coags  - NPOpMN for IR nephrostomy tube exchange tomorrow  - ADA diet

## 2017-04-06 PROCEDURE — 50435 EXCHANGE NEPHROSTOMY CATH: CPT | Mod: RT

## 2017-04-06 PROCEDURE — 99223 1ST HOSP IP/OBS HIGH 75: CPT

## 2017-04-06 RX ORDER — DIVALPROEX SODIUM 500 MG/1
250 TABLET, DELAYED RELEASE ORAL DAILY
Refills: 0 | Status: DISCONTINUED | OUTPATIENT
Start: 2017-04-06 | End: 2017-04-07

## 2017-04-06 RX ORDER — ACETAMINOPHEN 500 MG
325 TABLET ORAL EVERY 4 HOURS
Refills: 0 | Status: DISCONTINUED | OUTPATIENT
Start: 2017-04-06 | End: 2017-04-08

## 2017-04-06 RX ORDER — QUETIAPINE FUMARATE 200 MG/1
25 TABLET, FILM COATED ORAL AT BEDTIME
Refills: 0 | Status: DISCONTINUED | OUTPATIENT
Start: 2017-04-06 | End: 2017-04-08

## 2017-04-06 RX ORDER — SODIUM CHLORIDE 9 MG/ML
1000 INJECTION, SOLUTION INTRAVENOUS
Refills: 0 | Status: DISCONTINUED | OUTPATIENT
Start: 2017-04-06 | End: 2017-04-08

## 2017-04-06 RX ORDER — QUETIAPINE FUMARATE 200 MG/1
25 TABLET, FILM COATED ORAL DAILY
Refills: 0 | Status: DISCONTINUED | OUTPATIENT
Start: 2017-04-06 | End: 2017-04-08

## 2017-04-06 RX ADMIN — DONEPEZIL HYDROCHLORIDE 5 MILLIGRAM(S): 10 TABLET, FILM COATED ORAL at 23:12

## 2017-04-06 RX ADMIN — HEPARIN SODIUM 5000 UNIT(S): 5000 INJECTION INTRAVENOUS; SUBCUTANEOUS at 06:36

## 2017-04-06 RX ADMIN — Medication 325 MILLIGRAM(S): at 13:00

## 2017-04-06 RX ADMIN — Medication 325 MILLIGRAM(S): at 19:02

## 2017-04-06 RX ADMIN — Medication 325 MILLIGRAM(S): at 02:30

## 2017-04-06 RX ADMIN — SODIUM CHLORIDE 75 MILLILITER(S): 9 INJECTION, SOLUTION INTRAVENOUS at 06:59

## 2017-04-06 RX ADMIN — Medication 325 MILLIGRAM(S): at 19:45

## 2017-04-06 RX ADMIN — DIVALPROEX SODIUM 250 MILLIGRAM(S): 500 TABLET, DELAYED RELEASE ORAL at 12:13

## 2017-04-06 RX ADMIN — QUETIAPINE FUMARATE 25 MILLIGRAM(S): 200 TABLET, FILM COATED ORAL at 12:13

## 2017-04-06 RX ADMIN — SODIUM CHLORIDE 110 MILLILITER(S): 9 INJECTION, SOLUTION INTRAVENOUS at 02:26

## 2017-04-06 RX ADMIN — Medication 325 MILLIGRAM(S): at 12:13

## 2017-04-06 RX ADMIN — Medication 325 MILLIGRAM(S): at 02:31

## 2017-04-06 RX ADMIN — CEFTRIAXONE 100 GRAM(S): 500 INJECTION, POWDER, FOR SOLUTION INTRAMUSCULAR; INTRAVENOUS at 19:09

## 2017-04-06 RX ADMIN — Medication 325 MILLIGRAM(S): at 07:20

## 2017-04-06 RX ADMIN — SODIUM CHLORIDE 75 MILLILITER(S): 9 INJECTION, SOLUTION INTRAVENOUS at 07:29

## 2017-04-06 RX ADMIN — HEPARIN SODIUM 5000 UNIT(S): 5000 INJECTION INTRAVENOUS; SUBCUTANEOUS at 23:13

## 2017-04-06 RX ADMIN — RISPERIDONE 0.5 MILLIGRAM(S): 4 TABLET ORAL at 06:37

## 2017-04-06 RX ADMIN — Medication 100 MICROGRAM(S): at 06:37

## 2017-04-06 RX ADMIN — Medication 325 MILLIGRAM(S): at 07:18

## 2017-04-06 RX ADMIN — RISPERIDONE 0.5 MILLIGRAM(S): 4 TABLET ORAL at 19:02

## 2017-04-06 RX ADMIN — QUETIAPINE FUMARATE 25 MILLIGRAM(S): 200 TABLET, FILM COATED ORAL at 23:12

## 2017-04-07 ENCOUNTER — TRANSCRIPTION ENCOUNTER (OUTPATIENT)
Age: 82
End: 2017-04-07

## 2017-04-07 LAB
BACTERIA UR CULT: SIGNIFICANT CHANGE UP
SPECIMEN SOURCE: SIGNIFICANT CHANGE UP

## 2017-04-07 PROCEDURE — 93010 ELECTROCARDIOGRAM REPORT: CPT

## 2017-04-07 RX ORDER — DONEPEZIL HYDROCHLORIDE 10 MG/1
10 TABLET, FILM COATED ORAL AT BEDTIME
Refills: 0 | Status: DISCONTINUED | OUTPATIENT
Start: 2017-04-07 | End: 2017-04-08

## 2017-04-07 RX ORDER — DIVALPROEX SODIUM 500 MG/1
1 TABLET, DELAYED RELEASE ORAL
Qty: 0 | Refills: 0 | DISCHARGE
Start: 2017-04-07

## 2017-04-07 RX ORDER — QUETIAPINE FUMARATE 200 MG/1
1 TABLET, FILM COATED ORAL
Qty: 0 | Refills: 0 | DISCHARGE
Start: 2017-04-07

## 2017-04-07 RX ORDER — APIXABAN 2.5 MG/1
1 TABLET, FILM COATED ORAL
Qty: 60 | Refills: 0
Start: 2017-04-07 | End: 2017-05-07

## 2017-04-07 RX ORDER — FLUCONAZOLE 150 MG/1
100 TABLET ORAL DAILY
Refills: 0 | Status: DISCONTINUED | OUTPATIENT
Start: 2017-04-07 | End: 2017-04-08

## 2017-04-07 RX ORDER — FLUCONAZOLE 150 MG/1
1 TABLET ORAL
Qty: 14 | Refills: 0
Start: 2017-04-07 | End: 2017-04-21

## 2017-04-07 RX ORDER — DIVALPROEX SODIUM 500 MG/1
250 TABLET, DELAYED RELEASE ORAL
Refills: 0 | Status: DISCONTINUED | OUTPATIENT
Start: 2017-04-07 | End: 2017-04-08

## 2017-04-07 RX ORDER — LEVOTHYROXINE SODIUM 125 MCG
75 TABLET ORAL DAILY
Refills: 0 | Status: DISCONTINUED | OUTPATIENT
Start: 2017-04-07 | End: 2017-04-08

## 2017-04-07 RX ORDER — ACETAMINOPHEN 500 MG
1 TABLET ORAL
Qty: 0 | Refills: 0 | DISCHARGE
Start: 2017-04-07

## 2017-04-07 RX ORDER — POLYETHYLENE GLYCOL 3350 17 G/17G
17 POWDER, FOR SOLUTION ORAL DAILY
Refills: 0 | Status: DISCONTINUED | OUTPATIENT
Start: 2017-04-07 | End: 2017-04-08

## 2017-04-07 RX ORDER — CEFPODOXIME PROXETIL 100 MG
1 TABLET ORAL
Qty: 10 | Refills: 0
Start: 2017-04-07 | End: 2017-04-12

## 2017-04-07 RX ORDER — POLYETHYLENE GLYCOL 3350 17 G/17G
17 POWDER, FOR SOLUTION ORAL
Qty: 0 | Refills: 0 | DISCHARGE
Start: 2017-04-07

## 2017-04-07 RX ADMIN — Medication 1 TABLET(S): at 22:24

## 2017-04-07 RX ADMIN — Medication 4: at 17:28

## 2017-04-07 RX ADMIN — HEPARIN SODIUM 5000 UNIT(S): 5000 INJECTION INTRAVENOUS; SUBCUTANEOUS at 07:30

## 2017-04-07 RX ADMIN — QUETIAPINE FUMARATE 25 MILLIGRAM(S): 200 TABLET, FILM COATED ORAL at 22:24

## 2017-04-07 RX ADMIN — HEPARIN SODIUM 5000 UNIT(S): 5000 INJECTION INTRAVENOUS; SUBCUTANEOUS at 22:23

## 2017-04-07 RX ADMIN — Medication 2: at 22:23

## 2017-04-07 RX ADMIN — Medication 4: at 09:09

## 2017-04-07 RX ADMIN — FLUCONAZOLE 100 MILLIGRAM(S): 150 TABLET ORAL at 22:24

## 2017-04-07 RX ADMIN — RISPERIDONE 0.5 MILLIGRAM(S): 4 TABLET ORAL at 07:29

## 2017-04-07 RX ADMIN — DIVALPROEX SODIUM 250 MILLIGRAM(S): 500 TABLET, DELAYED RELEASE ORAL at 12:47

## 2017-04-07 RX ADMIN — Medication 2: at 12:47

## 2017-04-07 RX ADMIN — QUETIAPINE FUMARATE 25 MILLIGRAM(S): 200 TABLET, FILM COATED ORAL at 12:47

## 2017-04-07 RX ADMIN — Medication 325 MILLIGRAM(S): at 02:19

## 2017-04-07 RX ADMIN — CEFTRIAXONE 100 GRAM(S): 500 INJECTION, POWDER, FOR SOLUTION INTRAMUSCULAR; INTRAVENOUS at 17:28

## 2017-04-07 RX ADMIN — POLYETHYLENE GLYCOL 3350 17 GRAM(S): 17 POWDER, FOR SOLUTION ORAL at 15:29

## 2017-04-07 RX ADMIN — DONEPEZIL HYDROCHLORIDE 10 MILLIGRAM(S): 10 TABLET, FILM COATED ORAL at 22:35

## 2017-04-07 RX ADMIN — Medication 100 MICROGRAM(S): at 07:29

## 2017-04-07 RX ADMIN — Medication 325 MILLIGRAM(S): at 03:00

## 2017-04-07 NOTE — DISCHARGE NOTE ADULT - HOSPITAL COURSE
81 F with dementia, DVT recently admitted to [facility] with R hydro and obstruction in the setting of infection, treated with nephrostomy tube placement, brought to ED by family who noted no output from NT, pt otherwise at baseline, afebrile.  KUB revealed NT to be partially dislodged.  Pt admitted and underwent exchange of right NT by IR on 4/6/17.  Postprocedure pt remained afebrile with good urine output. Pt d/c on 4/7/17 with NT to drainage to complete 5 more days of Vantin and to restart Eliquis 81 F with dementia, DVT recently admitted to [facility] with R hydro and obstruction in the setting of infection, treated with nephrostomy tube placement, brought to ED by family who noted no output from NT, pt otherwise at baseline, afebrile.  KUB revealed NT to be partially dislodged.  Pt admitted and underwent exchange of right NT by IR on 4/6/17.  Postprocedure pt remained afebrile with good urine output. Pt d/c on 4/8/17 with NT to drainage to complete 5 more days of Vantin, 14 days of diflucan and to restart Eliquis. Pt to f/u with Dr. Kurtz

## 2017-04-07 NOTE — DISCHARGE NOTE ADULT - CONDITIONS AT DISCHARGE
Pt awake, alert, confused R/T hx dementia. Safety maintained. Patient with NT in place with dry sterile dressing, anchored with tape. Pt voiding without difficulty. passing flatus and zain po diet well. VSS Afebrile.

## 2017-04-07 NOTE — DISCHARGE NOTE ADULT - INSTRUCTIONS
Maintain NT in place with dry sterile dressing, anchored with tape to prevent dislodging at home. . Call MD with any signs of infection ie fever/shaking chills, redness at incisional drain site, or with signs of bleeding, persistent nausea or difficulty urinating. continue to drink plenty of fluids and avoid straining and constipation which can be a side effect from taking narcotic medications. No heavy lifting greater than 10 pounds. Follow-up with MD in office as instructed for post-op check and continuity of care.

## 2017-04-07 NOTE — DISCHARGE NOTE ADULT - CARE PROVIDERS DIRECT ADDRESSES
[email or web address]

## 2017-04-07 NOTE — DISCHARGE NOTE ADULT - MEDICATION SUMMARY - MEDICATIONS TO TAKE
I will START or STAY ON the medications listed below when I get home from the hospital:    aspirin 81 mg oral tablet  -- 1 tab(s) by mouth once a day  -- Indication: For Home med    acetaminophen 325 mg oral tablet  -- 1 tab(s) by mouth every 4 hours, As needed, For Pain  -- Indication: For Pain    Eliquis 2.5 mg oral tablet  -- 1 tab(s) by mouth 2 times a day  -- Check with your doctor before becoming pregnant.  It is very important that you take or use this exactly as directed.  Do not skip doses or discontinue unless directed by your doctor.  Obtain medical advice before taking any non-prescription drugs as some may affect the action of this medication.    -- Indication: For HOme med    divalproex sodium 250 mg oral tablet, extended release  -- 1 tab(s) by mouth 2 times a day  -- Indication: For Home med    glipiZIDE 5 mg oral tablet  -- 1 tab(s) by mouth once a day  -- Indication: For Home med    metFORMIN 1000 mg oral tablet, extended release  -- 1 tab(s) by mouth once a day  -- Indication: For Home med    QUEtiapine 25 mg oral tablet  -- 1 tab(s) by mouth once a day (at bedtime)  -- Indication: For Home med    QUEtiapine 25 mg oral tablet  -- 1 tab(s) by mouth once a day  -- Indication: For Home med    alendronate 70 mg oral tablet  -- 1 tab(s) by mouth once a week  -- Indication: For Home med    cefpodoxime 100 mg oral tablet  -- 1 tab(s) by mouth 2 times a day  -- Finish all this medication unless otherwise directed by prescriber.  Take with food or milk.    -- Indication: For Antibiotic    donepezil 10 mg oral tablet  -- 1 tab(s) by mouth once a day (at bedtime)  -- Indication: For Home med    levothyroxine 75 mcg (0.075 mg) oral capsule  -- 1 cap(s) by mouth once a day  -- Indication: For Home med    Os-Esdras Calcium+D3 oral tablet  -- 1 tab(s) by mouth 3 times a day  -- Indication: For Home med I will START or STAY ON the medications listed below when I get home from the hospital:    aspirin 81 mg oral tablet  -- 1 tab(s) by mouth once a day  -- Indication: For Home med    acetaminophen 325 mg oral tablet  -- 1 tab(s) by mouth every 4 hours, As needed, For Pain  -- Indication: For Pain    Eliquis 2.5 mg oral tablet  -- 1 tab(s) by mouth 2 times a day  -- Check with your doctor before becoming pregnant.  It is very important that you take or use this exactly as directed.  Do not skip doses or discontinue unless directed by your doctor.  Obtain medical advice before taking any non-prescription drugs as some may affect the action of this medication.    -- Indication: For HOme med    divalproex sodium 250 mg oral tablet, extended release  -- 1 tab(s) by mouth 2 times a day  -- Indication: For Home med    glipiZIDE 5 mg oral tablet  -- 1 tab(s) by mouth once a day  -- Indication: For Home med    metFORMIN 1000 mg oral tablet, extended release  -- 1 tab(s) by mouth once a day  -- Indication: For Home med    QUEtiapine 25 mg oral tablet  -- 1 tab(s) by mouth once a day (at bedtime)  -- Indication: For Home med    QUEtiapine 25 mg oral tablet  -- 1 tab(s) by mouth once a day  -- Indication: For Home med    alendronate 70 mg oral tablet  -- 1 tab(s) by mouth once a week  -- Indication: For Home med    cefpodoxime 100 mg oral tablet  -- 1 tab(s) by mouth 2 times a day  -- Finish all this medication unless otherwise directed by prescriber.  Take with food or milk.    -- Indication: For Antibiotic    donepezil 10 mg oral tablet  -- 1 tab(s) by mouth once a day (at bedtime)  -- Indication: For Home med    polyethylene glycol 3350 oral powder for reconstitution  -- 17 gram(s) by mouth once a day  -- Indication: For Constipation    levothyroxine 75 mcg (0.075 mg) oral capsule  -- 1 cap(s) by mouth once a day  -- Indication: For Home med    Os-Esdras Calcium+D3 oral tablet  -- 1 tab(s) by mouth 3 times a day  -- Indication: For Home med I will START or STAY ON the medications listed below when I get home from the hospital:    aspirin 81 mg oral tablet  -- 1 tab(s) by mouth once a day  -- Indication: For Home med    acetaminophen 325 mg oral tablet  -- 1 tab(s) by mouth every 4 hours, As needed, For Pain  -- Indication: For Pain    Eliquis 2.5 mg oral tablet  -- 1 tab(s) by mouth 2 times a day  -- Check with your doctor before becoming pregnant.  It is very important that you take or use this exactly as directed.  Do not skip doses or discontinue unless directed by your doctor.  Obtain medical advice before taking any non-prescription drugs as some may affect the action of this medication.    -- Indication: For Home med    divalproex sodium 250 mg oral tablet, extended release  -- 1 tab(s) by mouth 2 times a day  -- Indication: For Home med    glipiZIDE 5 mg oral tablet  -- 1 tab(s) by mouth once a day  -- Indication: For Home med    metFORMIN 1000 mg oral tablet, extended release  -- 1 tab(s) by mouth once a day  -- Indication: For Home med    Diflucan 100 mg oral tablet  -- 1 tab(s) by mouth once a day  -- Do not take this drug if you are pregnant.  Finish all this medication unless otherwise directed by prescriber.    -- Indication: For Antifungal    QUEtiapine 25 mg oral tablet  -- 1 tab(s) by mouth once a day (at bedtime)  -- Indication: For Home med    QUEtiapine 25 mg oral tablet  -- 1 tab(s) by mouth once a day  -- Indication: For Home med    alendronate 70 mg oral tablet  -- 1 tab(s) by mouth once a week  -- Indication: For Home med    cefpodoxime 100 mg oral tablet  -- 1 tab(s) by mouth 2 times a day  -- Finish all this medication unless otherwise directed by prescriber.  Take with food or milk.    -- Indication: For Antibiotic    donepezil 10 mg oral tablet  -- 1 tab(s) by mouth once a day (at bedtime)  -- Indication: For Home med    polyethylene glycol 3350 oral powder for reconstitution  -- 17 gram(s) by mouth once a day  -- Indication: For Constipation    levothyroxine 75 mcg (0.075 mg) oral capsule  -- 1 cap(s) by mouth once a day  -- Indication: For Home med    Os-Esdras Calcium+D3 oral tablet  -- 1 tab(s) by mouth 3 times a day  -- Indication: For Home med

## 2017-04-07 NOTE — DISCHARGE NOTE ADULT - PLAN OF CARE
Pain control Empty nephrostomy tube bag as needed.  Call Dr. Kurtz's office to schedule a follow up appointment.  Call the office if you have fever greater than 101, difficulty urinating, no urine in bag, pain not relieved with pain medication, nausea/vomiting. Continue home medications as documented on discharge paperwork Do not restart Diovan until instructed to do so by Ms Garcia's primary care provider  BP in hospital without medication was 120/60

## 2017-04-07 NOTE — DISCHARGE NOTE ADULT - ADDITIONAL INSTRUCTIONS
Please follow up with her primary care physician regarding her hospitalization within 1 week after discharge.

## 2017-04-07 NOTE — DISCHARGE NOTE ADULT - CARE PLAN
Principal Discharge DX:	Renal mass  Goal:	Pain control  Instructions for follow-up, activity and diet:	Empty nephrostomy tube bag as needed.  Call Dr. Kurtz's office to schedule a follow up appointment.  Call the office if you have fever greater than 101, difficulty urinating, no urine in bag, pain not relieved with pain medication, nausea/vomiting.  Secondary Diagnosis:	Alzheimer disease  Instructions for follow-up, activity and diet:	Continue home medications as documented on discharge paperwork  Secondary Diagnosis:	Diabetes  Instructions for follow-up, activity and diet:	Continue home medications as documented on discharge paperwork  Secondary Diagnosis:	Hypertension  Instructions for follow-up, activity and diet:	Do not restart Diovan until instructed to do so by Ms Garcia's primary care provider  BP in hospital without medication was 120/60  Secondary Diagnosis:	Hypothyroid  Instructions for follow-up, activity and diet:	Continue home medications as documented on discharge paperwork

## 2017-04-07 NOTE — DISCHARGE NOTE ADULT - CARE PROVIDER_API CALL
Kee Kurtz), Urology  [address] [place] [id number]  Phone: [phone]  Fax: [phone]

## 2017-04-07 NOTE — DISCHARGE NOTE ADULT - MEDICATION SUMMARY - MEDICATIONS TO STOP TAKING
I will STOP taking the medications listed below when I get home from the hospital:    Diovan 80 mg oral tablet  -- 1 tab(s) by mouth once a day    Diflucan 200 mg oral tablet  -- 1 tab(s) by mouth once a day    risperiDONE 0.5 mg oral tablet  -- 1 tab(s) by mouth 2 times a day    risperiDONE 0.5 mg oral tablet  -- 1 tab(s) by mouth every 6 hours, As Needed agitation I will STOP taking the medications listed below when I get home from the hospital:    Diovan 80 mg oral tablet  -- 1 tab(s) by mouth once a day    risperiDONE 0.5 mg oral tablet  -- 1 tab(s) by mouth 2 times a day    risperiDONE 0.5 mg oral tablet  -- 1 tab(s) by mouth every 6 hours, As Needed agitation

## 2017-04-07 NOTE — DISCHARGE NOTE ADULT - PATIENT PORTAL LINK FT
“You can access the Follow[facility] Patient Portal, offered by [facility], by registering with the following website: [email or web address]”

## 2017-04-07 NOTE — DISCHARGE NOTE ADULT - MEDICATION SUMMARY - MEDICATIONS TO CHANGE
I will SWITCH the dose or number of times a day I take the medications listed below when I get home from the hospital:    glipiZIDE 2.5 mg oral tablet, extended release  -- 1 tab(s) by mouth once a day    donepezil 5 mg oral tablet  -- 1 tab(s) by mouth once a day (at bedtime)    Synthroid 100 mcg (0.1 mg) oral tablet  -- 1 tab(s) by mouth once a day I will SWITCH the dose or number of times a day I take the medications listed below when I get home from the hospital:    glipiZIDE 2.5 mg oral tablet, extended release  -- 1 tab(s) by mouth once a day    Diflucan 200 mg oral tablet  -- 1 tab(s) by mouth once a day    donepezil 5 mg oral tablet  -- 1 tab(s) by mouth once a day (at bedtime)    Synthroid 100 mcg (0.1 mg) oral tablet  -- 1 tab(s) by mouth once a day

## 2017-04-08 VITALS
OXYGEN SATURATION: 98 % | TEMPERATURE: 98 F | DIASTOLIC BLOOD PRESSURE: 66 MMHG | RESPIRATION RATE: 18 BRPM | HEART RATE: 70 BPM | SYSTOLIC BLOOD PRESSURE: 133 MMHG

## 2017-04-08 PROCEDURE — 99233 SBSQ HOSP IP/OBS HIGH 50: CPT

## 2017-04-08 RX ORDER — FLUCONAZOLE 150 MG/1
1 TABLET ORAL
Qty: 14 | Refills: 0
Start: 2017-04-08 | End: 2017-04-22

## 2017-04-08 RX ADMIN — Medication 1 TABLET(S): at 07:14

## 2017-04-08 RX ADMIN — Medication 75 MICROGRAM(S): at 07:15

## 2017-04-08 RX ADMIN — HEPARIN SODIUM 5000 UNIT(S): 5000 INJECTION INTRAVENOUS; SUBCUTANEOUS at 07:14

## 2017-04-08 RX ADMIN — Medication 4: at 08:32

## 2017-04-08 RX ADMIN — DIVALPROEX SODIUM 250 MILLIGRAM(S): 500 TABLET, DELAYED RELEASE ORAL at 07:15

## 2017-04-11 ENCOUNTER — RX RENEWAL (OUTPATIENT)
Age: 82
End: 2017-04-11

## 2017-04-11 ENCOUNTER — APPOINTMENT (OUTPATIENT)
Dept: INTERNAL MEDICINE | Facility: CLINIC | Age: 82
End: 2017-04-11

## 2017-04-11 DIAGNOSIS — I10 ESSENTIAL (PRIMARY) HYPERTENSION: ICD-10-CM

## 2017-04-13 LAB
APPEARANCE: ABNORMAL
BACTERIA UR CULT: NORMAL
BACTERIA: NEGATIVE
BILIRUBIN URINE: NEGATIVE
BLOOD URINE: ABNORMAL
COLOR: YELLOW
GLUCOSE QUALITATIVE U: NORMAL MG/DL
HYALINE CASTS: 8 /LPF
KETONES URINE: NEGATIVE
LEUKOCYTE ESTERASE URINE: ABNORMAL
MICROSCOPIC-UA: NORMAL
NITRITE URINE: NEGATIVE
PH URINE: 6
PROTEIN URINE: 100 MG/DL
RED BLOOD CELLS URINE: 61 /HPF
SPECIFIC GRAVITY URINE: 1.02
SQUAMOUS EPITHELIAL CELLS: 6 /HPF
UROBILINOGEN URINE: NORMAL MG/DL
WHITE BLOOD CELLS URINE: 188 /HPF

## 2017-04-14 ENCOUNTER — APPOINTMENT (OUTPATIENT)
Dept: GERIATRICS | Facility: CLINIC | Age: 82
End: 2017-04-14

## 2017-04-14 VITALS — WEIGHT: 150 LBS | BODY MASS INDEX: 30.24 KG/M2 | HEIGHT: 59 IN

## 2017-04-14 RX ORDER — NUT.TX.IMPAIRED DIGESTIVE FXN 0.035-1/ML
LIQUID (ML) ORAL
Qty: 90 | Refills: 3 | Status: ACTIVE | COMMUNITY
Start: 2017-04-14 | End: 1900-01-01

## 2017-04-17 ENCOUNTER — RX RENEWAL (OUTPATIENT)
Age: 82
End: 2017-04-17

## 2017-04-20 ENCOUNTER — MESSAGE (OUTPATIENT)
Age: 82
End: 2017-04-20

## 2017-04-24 ENCOUNTER — OUTPATIENT (OUTPATIENT)
Dept: OUTPATIENT SERVICES | Facility: HOSPITAL | Age: 82
LOS: 1 days | Discharge: HOME | End: 2017-04-24
Payer: COMMERCIAL

## 2017-04-24 DIAGNOSIS — Z87.442 PERSONAL HISTORY OF URINARY CALCULI: Chronic | ICD-10-CM

## 2017-04-24 DIAGNOSIS — F02.81 DEMENTIA IN OTHER DISEASES CLASSIFIED ELSEWHERE, UNSPECIFIED SEVERITY, WITH BEHAVIORAL DISTURBANCE: ICD-10-CM

## 2017-04-24 DIAGNOSIS — K80.20 CALCULUS OF GALLBLADDER WITHOUT CHOLECYSTITIS WITHOUT OBSTRUCTION: Chronic | ICD-10-CM

## 2017-04-24 DIAGNOSIS — F31.0 BIPOLAR DISORDER, CURRENT EPISODE HYPOMANIC: ICD-10-CM

## 2017-04-24 DIAGNOSIS — Z90.49 ACQUIRED ABSENCE OF OTHER SPECIFIED PARTS OF DIGESTIVE TRACT: Chronic | ICD-10-CM

## 2017-04-25 DIAGNOSIS — G30.8 OTHER ALZHEIMER'S DISEASE: ICD-10-CM

## 2017-04-25 LAB — BACTERIA UR CULT: ABNORMAL

## 2017-05-01 ENCOUNTER — APPOINTMENT (OUTPATIENT)
Dept: INTERNAL MEDICINE | Facility: CLINIC | Age: 82
End: 2017-05-01

## 2017-05-01 DIAGNOSIS — R46.89 OTHER SYMPTOMS AND SIGNS INVOLVING APPEARANCE AND BEHAVIOR: ICD-10-CM

## 2017-05-01 DIAGNOSIS — R19.00 INTRA-ABDOMINAL AND PELVIC SWELLING, MASS AND LUMP, UNSPECIFIED SITE: ICD-10-CM

## 2017-05-01 RX ORDER — RISPERIDONE 0.5 MG/1
0.5 TABLET, FILM COATED ORAL TWICE DAILY
Refills: 0 | Status: DISCONTINUED | COMMUNITY
Start: 2017-03-13 | End: 2017-05-01

## 2017-05-01 RX ORDER — LEVOFLOXACIN 500 MG/1
500 TABLET, FILM COATED ORAL DAILY
Qty: 7 | Refills: 0 | Status: DISCONTINUED | COMMUNITY
Start: 2017-03-29 | End: 2017-05-01

## 2017-05-01 RX ORDER — NITROFURANTOIN MACROCRYSTALS 100 MG/1
100 CAPSULE ORAL
Qty: 90 | Refills: 0 | Status: DISCONTINUED | COMMUNITY
Start: 2017-02-21 | End: 2017-05-01

## 2017-05-01 RX ORDER — CEFPODOXIME PROXETIL 100 MG/1
100 TABLET, FILM COATED ORAL
Qty: 28 | Refills: 0 | Status: DISCONTINUED | COMMUNITY
Start: 2017-04-03 | End: 2017-05-01

## 2017-05-01 RX ORDER — QUETIAPINE FUMARATE 25 MG/1
25 TABLET ORAL
Qty: 30 | Refills: 1 | Status: DISCONTINUED | COMMUNITY
Start: 2017-03-04 | End: 2017-05-01

## 2017-05-08 ENCOUNTER — MEDICATION RENEWAL (OUTPATIENT)
Age: 82
End: 2017-05-08

## 2017-05-09 ENCOUNTER — RX RENEWAL (OUTPATIENT)
Age: 82
End: 2017-05-09

## 2017-05-09 PROBLEM — R46.89 BEHAVIORAL CHANGE: Status: ACTIVE | Noted: 2017-03-04

## 2017-05-09 PROBLEM — R19.00 PELVIC MASS: Status: ACTIVE | Noted: 2017-03-03

## 2017-05-12 ENCOUNTER — MEDICATION RENEWAL (OUTPATIENT)
Age: 82
End: 2017-05-12

## 2017-05-12 RX ORDER — SERTRALINE HYDROCHLORIDE 50 MG/1
50 TABLET, FILM COATED ORAL DAILY
Qty: 30 | Refills: 1 | Status: ACTIVE | COMMUNITY
Start: 2017-05-01 | End: 1900-01-01

## 2017-05-15 ENCOUNTER — MEDICATION RENEWAL (OUTPATIENT)
Age: 82
End: 2017-05-15

## 2017-05-15 RX ORDER — CHLORHEXIDINE GLUCONATE 4 %
325 (65 FE) LIQUID (ML) TOPICAL DAILY
Qty: 30 | Refills: 2 | Status: ACTIVE | COMMUNITY
Start: 2017-03-29 | End: 1900-01-01

## 2017-05-24 ENCOUNTER — MESSAGE (OUTPATIENT)
Age: 82
End: 2017-05-24

## 2017-05-30 LAB — BACTERIA UR CULT: ABNORMAL

## 2017-06-09 ENCOUNTER — RX RENEWAL (OUTPATIENT)
Age: 82
End: 2017-06-09

## 2017-06-09 RX ORDER — APIXABAN 2.5 MG/1
2.5 TABLET, FILM COATED ORAL TWICE DAILY
Qty: 60 | Refills: 5 | Status: ACTIVE | COMMUNITY
Start: 2017-05-12 | End: 1900-01-01

## 2017-06-16 ENCOUNTER — APPOINTMENT (OUTPATIENT)
Dept: UROLOGY | Facility: CLINIC | Age: 82
End: 2017-06-16

## 2017-06-16 DIAGNOSIS — N13.5 CROSSING VESSEL AND STRICTURE OF URETER W/OUT HYDRONEPHROSIS: ICD-10-CM

## 2017-06-16 DIAGNOSIS — E11.9 TYPE 2 DIABETES MELLITUS W/OUT COMPLICATIONS: ICD-10-CM

## 2017-06-16 DIAGNOSIS — I82.409 ACUTE EMBOLISM AND THROMBOSIS OF UNSPECIFIED DEEP VEINS OF UNSPECIFIED LOWER EXTREMITY: ICD-10-CM

## 2017-06-16 DIAGNOSIS — N13.30 UNSPECIFIED HYDRONEPHROSIS: ICD-10-CM

## 2017-06-16 DIAGNOSIS — Z87.440 PERSONAL HISTORY OF URINARY (TRACT) INFECTIONS: ICD-10-CM

## 2017-06-16 DIAGNOSIS — E78.5 HYPERLIPIDEMIA, UNSPECIFIED: ICD-10-CM

## 2017-06-16 DIAGNOSIS — R41.3 OTHER AMNESIA: ICD-10-CM

## 2017-06-16 RX ORDER — DIVALPROEX SODIUM 250 MG/1
250 TABLET, DELAYED RELEASE ORAL
Qty: 60 | Refills: 0 | Status: ACTIVE | COMMUNITY
Start: 2017-02-28

## 2017-06-16 RX ORDER — DONEPEZIL HYDROCHLORIDE 10 MG/1
10 TABLET ORAL
Qty: 30 | Refills: 0 | Status: ACTIVE | COMMUNITY
Start: 2017-02-24

## 2017-06-16 RX ORDER — FLUCONAZOLE 100 MG/1
100 TABLET ORAL
Qty: 14 | Refills: 0 | Status: ACTIVE | COMMUNITY
Start: 2017-04-07

## 2017-06-16 RX ORDER — PREDNISOLONE ACETATE 10 MG/ML
1 SUSPENSION/ DROPS OPHTHALMIC
Qty: 5 | Refills: 0 | Status: ACTIVE | COMMUNITY
Start: 2017-05-09

## 2017-06-16 RX ORDER — FLUCONAZOLE 200 MG/1
200 TABLET ORAL
Qty: 10 | Refills: 0 | Status: ACTIVE | COMMUNITY
Start: 2017-03-10

## 2017-06-19 LAB — BACTERIA UR CULT: ABNORMAL

## 2017-06-19 RX ORDER — LEVOFLOXACIN 500 MG/1
500 TABLET, FILM COATED ORAL DAILY
Qty: 5 | Refills: 0 | Status: ACTIVE | COMMUNITY
Start: 2017-06-19 | End: 1900-01-01

## 2017-06-19 RX ORDER — FLUCONAZOLE 150 MG/1
150 TABLET ORAL DAILY
Qty: 3 | Refills: 0 | Status: ACTIVE | COMMUNITY
Start: 2017-06-19 | End: 1900-01-01

## 2017-06-21 ENCOUNTER — MESSAGE (OUTPATIENT)
Age: 82
End: 2017-06-21

## 2017-06-21 RX ORDER — FLUCONAZOLE 150 MG/1
1 TABLET ORAL
Qty: 0 | Refills: 0 | COMMUNITY
Start: 2017-06-21 | End: 2017-06-24

## 2017-06-23 ENCOUNTER — APPOINTMENT (OUTPATIENT)
Dept: INTERNAL MEDICINE | Facility: CLINIC | Age: 82
End: 2017-06-23

## 2017-06-23 DIAGNOSIS — R26.81 UNSTEADINESS ON FEET: ICD-10-CM

## 2017-06-23 DIAGNOSIS — F03.90 UNSPECIFIED DEMENTIA W/OUT BEHAVIORAL DISTURBANCE: ICD-10-CM

## 2017-06-23 RX ORDER — WHEELCHAIR
EACH MISCELLANEOUS
Qty: 1 | Refills: 0 | Status: ACTIVE | OUTPATIENT
Start: 2017-06-23

## 2017-06-24 ENCOUNTER — MESSAGE (OUTPATIENT)
Age: 82
End: 2017-06-24

## 2017-06-24 PROBLEM — R26.81 GAIT INSTABILITY: Status: ACTIVE | Noted: 2017-06-21

## 2017-06-24 RX ORDER — CLONAZEPAM 0.25 MG/1
0.25 TABLET, ORALLY DISINTEGRATING ORAL TWICE DAILY
Qty: 60 | Refills: 0 | Status: DISCONTINUED | COMMUNITY
Start: 2017-05-01 | End: 2017-06-24

## 2017-06-24 RX ORDER — CEPHALEXIN 500 MG/1
500 CAPSULE ORAL
Qty: 22 | Refills: 0 | Status: DISCONTINUED | COMMUNITY
Start: 2017-02-16 | End: 2017-06-24

## 2017-06-24 RX ORDER — SERTRALINE 25 MG/1
25 TABLET, FILM COATED ORAL
Qty: 30 | Refills: 0 | Status: DISCONTINUED | COMMUNITY
Start: 2017-05-01 | End: 2017-06-24

## 2017-06-24 RX ORDER — METFORMIN HYDROCHLORIDE 1000 MG/1
1000 TABLET, EXTENDED RELEASE ORAL
Qty: 60 | Refills: 0 | Status: DISCONTINUED | COMMUNITY
Start: 2016-02-29 | End: 2017-06-24

## 2017-06-29 ENCOUNTER — MESSAGE (OUTPATIENT)
Age: 82
End: 2017-06-29

## 2017-07-02 ENCOUNTER — INPATIENT (INPATIENT)
Facility: HOSPITAL | Age: 82
LOS: 33 days | Discharge: HOSPICE | End: 2017-08-05
Attending: INTERNAL MEDICINE | Admitting: INTERNAL MEDICINE
Payer: MEDICAID

## 2017-07-02 VITALS — DIASTOLIC BLOOD PRESSURE: 96 MMHG | RESPIRATION RATE: 24 BRPM | SYSTOLIC BLOOD PRESSURE: 119 MMHG | TEMPERATURE: 98 F

## 2017-07-02 DIAGNOSIS — R19.7 DIARRHEA, UNSPECIFIED: ICD-10-CM

## 2017-07-02 DIAGNOSIS — K80.20 CALCULUS OF GALLBLADDER WITHOUT CHOLECYSTITIS WITHOUT OBSTRUCTION: Chronic | ICD-10-CM

## 2017-07-02 DIAGNOSIS — Z90.49 ACQUIRED ABSENCE OF OTHER SPECIFIED PARTS OF DIGESTIVE TRACT: Chronic | ICD-10-CM

## 2017-07-02 DIAGNOSIS — Z87.442 PERSONAL HISTORY OF URINARY CALCULI: Chronic | ICD-10-CM

## 2017-07-02 LAB
ALBUMIN SERPL ELPH-MCNC: 2.6 G/DL — LOW (ref 3.3–5)
ALP SERPL-CCNC: 96 U/L — SIGNIFICANT CHANGE UP (ref 40–120)
ALT FLD-CCNC: 16 U/L — SIGNIFICANT CHANGE UP (ref 4–33)
APPEARANCE UR: SIGNIFICANT CHANGE UP
APTT BLD: 31 SEC — SIGNIFICANT CHANGE UP (ref 27.5–37.4)
AST SERPL-CCNC: 40 U/L — HIGH (ref 4–32)
BACTERIA # UR AUTO: SIGNIFICANT CHANGE UP
BASE EXCESS BLDV CALC-SCNC: 5.4 MMOL/L — SIGNIFICANT CHANGE UP
BASOPHILS # BLD AUTO: 0.07 K/UL — SIGNIFICANT CHANGE UP (ref 0–0.2)
BASOPHILS NFR BLD AUTO: 0.3 % — SIGNIFICANT CHANGE UP (ref 0–2)
BILIRUB SERPL-MCNC: 0.4 MG/DL — SIGNIFICANT CHANGE UP (ref 0.2–1.2)
BILIRUB UR-MCNC: NEGATIVE — SIGNIFICANT CHANGE UP
BLOOD GAS VENOUS - CREATININE: 0.6 MG/DL — SIGNIFICANT CHANGE UP (ref 0.5–1.3)
BLOOD UR QL VISUAL: HIGH
BUN SERPL-MCNC: 14 MG/DL — SIGNIFICANT CHANGE UP (ref 7–23)
CALCIUM SERPL-MCNC: 9.7 MG/DL — SIGNIFICANT CHANGE UP (ref 8.4–10.5)
CHLORIDE BLDV-SCNC: 95 MMOL/L — LOW (ref 96–108)
CHLORIDE SERPL-SCNC: 91 MMOL/L — LOW (ref 98–107)
CO2 SERPL-SCNC: 21 MMOL/L — LOW (ref 22–31)
COLOR SPEC: YELLOW — SIGNIFICANT CHANGE UP
CREAT SERPL-MCNC: 0.61 MG/DL — SIGNIFICANT CHANGE UP (ref 0.5–1.3)
EOSINOPHIL # BLD AUTO: 0.13 K/UL — SIGNIFICANT CHANGE UP (ref 0–0.5)
EOSINOPHIL NFR BLD AUTO: 0.6 % — SIGNIFICANT CHANGE UP (ref 0–6)
GAS PNL BLDV: 128 MMOL/L — LOW (ref 136–146)
GLUCOSE BLDV-MCNC: 145 — HIGH (ref 70–99)
GLUCOSE SERPL-MCNC: 139 MG/DL — HIGH (ref 70–99)
GLUCOSE UR-MCNC: NEGATIVE — SIGNIFICANT CHANGE UP
HCO3 BLDV-SCNC: 29 MMOL/L — HIGH (ref 20–27)
HCT VFR BLD CALC: 26.3 % — LOW (ref 34.5–45)
HCT VFR BLDV CALC: 26.2 % — LOW (ref 34.5–45)
HGB BLD-MCNC: 8.8 G/DL — LOW (ref 11.5–15.5)
HGB BLDV-MCNC: 8.4 G/DL — LOW (ref 11.5–15.5)
IMM GRANULOCYTES # BLD AUTO: 0.15 # — SIGNIFICANT CHANGE UP
IMM GRANULOCYTES NFR BLD AUTO: 0.7 % — SIGNIFICANT CHANGE UP (ref 0–1.5)
INR BLD: 1.26 — HIGH (ref 0.88–1.17)
KETONES UR-MCNC: NEGATIVE — SIGNIFICANT CHANGE UP
LACTATE BLDV-MCNC: 2.6 MMOL/L — HIGH (ref 0.5–2)
LEUKOCYTE ESTERASE UR-ACNC: HIGH
LIDOCAIN IGE QN: 7.9 U/L — SIGNIFICANT CHANGE UP (ref 7–60)
LYMPHOCYTES # BLD AUTO: 18 % — SIGNIFICANT CHANGE UP (ref 13–44)
LYMPHOCYTES # BLD AUTO: 3.76 K/UL — HIGH (ref 1–3.3)
MCHC RBC-ENTMCNC: 28 PG — SIGNIFICANT CHANGE UP (ref 27–34)
MCHC RBC-ENTMCNC: 33.5 % — SIGNIFICANT CHANGE UP (ref 32–36)
MCV RBC AUTO: 83.8 FL — SIGNIFICANT CHANGE UP (ref 80–100)
MONOCYTES # BLD AUTO: 1.72 K/UL — HIGH (ref 0–0.9)
MONOCYTES NFR BLD AUTO: 8.2 % — SIGNIFICANT CHANGE UP (ref 2–14)
NEUTROPHILS # BLD AUTO: 15.11 K/UL — HIGH (ref 1.8–7.4)
NEUTROPHILS NFR BLD AUTO: 72.2 % — SIGNIFICANT CHANGE UP (ref 43–77)
NITRITE UR-MCNC: NEGATIVE — SIGNIFICANT CHANGE UP
NRBC # FLD: 0 — SIGNIFICANT CHANGE UP
PCO2 BLDV: 42 MMHG — SIGNIFICANT CHANGE UP (ref 41–51)
PH BLDV: 7.46 PH — HIGH (ref 7.32–7.43)
PH UR: 6 — SIGNIFICANT CHANGE UP (ref 4.6–8)
PLATELET # BLD AUTO: 855 K/UL — HIGH (ref 150–400)
PMV BLD: 10.1 FL — SIGNIFICANT CHANGE UP (ref 7–13)
PO2 BLDV: 37 MMHG — SIGNIFICANT CHANGE UP (ref 35–40)
POTASSIUM BLDV-SCNC: 6.4 MMOL/L — CRITICAL HIGH (ref 3.4–4.5)
POTASSIUM SERPL-MCNC: 5.5 MMOL/L — HIGH (ref 3.5–5.3)
POTASSIUM SERPL-SCNC: 5.5 MMOL/L — HIGH (ref 3.5–5.3)
PROT SERPL-MCNC: 8 G/DL — SIGNIFICANT CHANGE UP (ref 6–8.3)
PROT UR-MCNC: 100 — HIGH
PROTHROM AB SERPL-ACNC: 14.2 SEC — HIGH (ref 9.8–13.1)
RBC # BLD: 3.14 M/UL — LOW (ref 3.8–5.2)
RBC # FLD: 15.9 % — HIGH (ref 10.3–14.5)
RBC CASTS # UR COMP ASSIST: SIGNIFICANT CHANGE UP (ref 0–?)
SAO2 % BLDV: 68.1 % — SIGNIFICANT CHANGE UP (ref 60–85)
SODIUM SERPL-SCNC: 132 MMOL/L — LOW (ref 135–145)
SP GR SPEC: 1.01 — SIGNIFICANT CHANGE UP (ref 1–1.03)
UROBILINOGEN FLD QL: NORMAL E.U. — SIGNIFICANT CHANGE UP (ref 0.1–0.2)
WBC # BLD: 20.94 K/UL — HIGH (ref 3.8–10.5)
WBC # FLD AUTO: 20.94 K/UL — HIGH (ref 3.8–10.5)
WBC UR QL: HIGH (ref 0–?)

## 2017-07-02 PROCEDURE — 99223 1ST HOSP IP/OBS HIGH 75: CPT

## 2017-07-02 RX ORDER — METRONIDAZOLE 500 MG
500 TABLET ORAL EVERY 8 HOURS
Qty: 0 | Refills: 0 | Status: DISCONTINUED | OUTPATIENT
Start: 2017-07-02 | End: 2017-07-12

## 2017-07-02 RX ORDER — DEXTROSE 50 % IN WATER 50 %
50 SYRINGE (ML) INTRAVENOUS ONCE
Qty: 0 | Refills: 0 | Status: DISCONTINUED | OUTPATIENT
Start: 2017-07-02 | End: 2017-07-02

## 2017-07-02 RX ORDER — INSULIN HUMAN 100 [IU]/ML
10 INJECTION, SOLUTION SUBCUTANEOUS ONCE
Qty: 0 | Refills: 0 | Status: DISCONTINUED | OUTPATIENT
Start: 2017-07-02 | End: 2017-07-02

## 2017-07-02 RX ORDER — SODIUM CHLORIDE 9 MG/ML
1000 INJECTION INTRAMUSCULAR; INTRAVENOUS; SUBCUTANEOUS ONCE
Qty: 0 | Refills: 0 | Status: COMPLETED | OUTPATIENT
Start: 2017-07-02 | End: 2017-07-02

## 2017-07-02 RX ADMIN — SODIUM CHLORIDE 1000 MILLILITER(S): 9 INJECTION INTRAMUSCULAR; INTRAVENOUS; SUBCUTANEOUS at 18:48

## 2017-07-02 RX ADMIN — Medication 1 MILLIGRAM(S): at 18:48

## 2017-07-02 RX ADMIN — Medication 500 MILLIGRAM(S): at 20:34

## 2017-07-02 NOTE — ED ADULT NURSE NOTE - OBJECTIVE STATEMENT
pt presents to ED with adult daughter c/o diarrhea. Patient has a PMH of Dementia and is baseline not alert. patient has a right nephrostomy that the gamily states is "connected to her bladder" and that the urologist empties. Family states that she urinates from her urethra. Patient been having diarrhea over the past 2-3 days and has been becoming weaker. IV 22 G L hand, labs sent, TQ removed no s/s of infiltration.

## 2017-07-02 NOTE — ED PROVIDER NOTE - MEDICAL DECISION MAKING DETAILS
Yesenia: 81 F, dementia, nephrostomy, on Abx recently for UTI, p/w watery diarrhea 4 x/day, no F or abd pain. Appears chronically ill. NAD. Abd NT/ND. No LE edema. Concern for C. diff. Plan: Flagyl, labs, IVF, admit.

## 2017-07-02 NOTE — ED PROVIDER NOTE - PMH
Alzheimer disease    Alzheimer's dementia with behavioral disturbance    Diabetes    HTN (hypertension)    HTN (hypertension)    Hypothyroid    Kidney mass    Renal mass, right    T2DM (type 2 diabetes mellitus)

## 2017-07-02 NOTE — ED ADULT TRIAGE NOTE - CHIEF COMPLAINT QUOTE
pt sent to ED by daughter.  as per daughter, pt has fever and diarrhea x a few days.  pt has PMH of dementia,  unable to obtain HR and SpO2 due to patient hitting.  pt is aggressive in triage

## 2017-07-02 NOTE — ED PROVIDER NOTE - ENMT, MLM
Airway patent, Nasal mucosa clear. Dry mucus membranes.  Throat has no vesicles, no oropharyngeal exudates and uvula is midline.

## 2017-07-02 NOTE — ED PROVIDER NOTE - OBJECTIVE STATEMENT
81F PMH dementia, minimally verbal at baseline, presents with diarrhea x 4 days.  Had recent UTI and treated with antibiotics. 81F PMH dementia, minimally verbal at baseline, DM2 presents with diarrhea x 4 days.  Had recent UTI and treated with antibiotics.  For the past 4 days has had watery diarrhea 2-4x daily, decreased PO intake, generalized weakness.  No melena/hematochezia.  No cough, dysuria.  Has R nephrostomy tube in place. 81F PMH dementia, minimally verbal at baseline, DM2 presents with diarrhea x 4 days.  Had recent UTI and treated with antibiotics.  For the past 4 days has had watery diarrhea 2-4x daily, decreased PO intake, generalized weakness.  No melena/hematochezia.  No cough, dysuria.  Has R nephrostomy tube in place.  Meds: per list from daughter - levothyroxine 0.75 mcg qd, eliquis 2.5 bid, seroquel 25 mg qid PRN, glipizide ER 5mg qd, riomet 10mg qd, sertraline 50mg qd,

## 2017-07-02 NOTE — ED ADULT NURSE REASSESSMENT NOTE - NS ED NURSE REASSESS COMMENT FT1
Report received from break coverage and facilitator RN's. Patient straight catheterized for urine. Cleaned, turned and positioned. Noted with stage 2 pressure ulcer measuring approximately 2x2 to sacrum. Pending lab results. Daughters remain at bedside. NAD at time.

## 2017-07-03 ENCOUNTER — MOBILE ON CALL (OUTPATIENT)
Age: 82
End: 2017-07-03

## 2017-07-03 DIAGNOSIS — I82.409 ACUTE EMBOLISM AND THROMBOSIS OF UNSPECIFIED DEEP VEINS OF UNSPECIFIED LOWER EXTREMITY: ICD-10-CM

## 2017-07-03 DIAGNOSIS — E03.9 HYPOTHYROIDISM, UNSPECIFIED: ICD-10-CM

## 2017-07-03 DIAGNOSIS — E11.9 TYPE 2 DIABETES MELLITUS WITHOUT COMPLICATIONS: ICD-10-CM

## 2017-07-03 DIAGNOSIS — A09 INFECTIOUS GASTROENTERITIS AND COLITIS, UNSPECIFIED: ICD-10-CM

## 2017-07-03 DIAGNOSIS — G30.1 ALZHEIMER'S DISEASE WITH LATE ONSET: ICD-10-CM

## 2017-07-03 LAB
BUN SERPL-MCNC: 14 MG/DL — SIGNIFICANT CHANGE UP (ref 7–23)
CALCIUM SERPL-MCNC: 9.1 MG/DL — SIGNIFICANT CHANGE UP (ref 8.4–10.5)
CHLORIDE SERPL-SCNC: 93 MMOL/L — LOW (ref 98–107)
CO2 SERPL-SCNC: 23 MMOL/L — SIGNIFICANT CHANGE UP (ref 22–31)
CREAT SERPL-MCNC: 0.6 MG/DL — SIGNIFICANT CHANGE UP (ref 0.5–1.3)
GLUCOSE SERPL-MCNC: 135 MG/DL — HIGH (ref 70–99)
HCT VFR BLD CALC: 25.2 % — LOW (ref 34.5–45)
HGB BLD-MCNC: 8.2 G/DL — LOW (ref 11.5–15.5)
MAGNESIUM SERPL-MCNC: 1.7 MG/DL — SIGNIFICANT CHANGE UP (ref 1.6–2.6)
MCHC RBC-ENTMCNC: 27.4 PG — SIGNIFICANT CHANGE UP (ref 27–34)
MCHC RBC-ENTMCNC: 32.5 % — SIGNIFICANT CHANGE UP (ref 32–36)
MCV RBC AUTO: 84.3 FL — SIGNIFICANT CHANGE UP (ref 80–100)
NRBC # FLD: 0 — SIGNIFICANT CHANGE UP
PLATELET # BLD AUTO: 840 K/UL — HIGH (ref 150–400)
PMV BLD: 10.3 FL — SIGNIFICANT CHANGE UP (ref 7–13)
POTASSIUM SERPL-MCNC: 4.2 MMOL/L — SIGNIFICANT CHANGE UP (ref 3.5–5.3)
POTASSIUM SERPL-SCNC: 4.2 MMOL/L — SIGNIFICANT CHANGE UP (ref 3.5–5.3)
RBC # BLD: 2.99 M/UL — LOW (ref 3.8–5.2)
RBC # FLD: 15.9 % — HIGH (ref 10.3–14.5)
SODIUM SERPL-SCNC: 133 MMOL/L — LOW (ref 135–145)
SPECIMEN SOURCE: SIGNIFICANT CHANGE UP
SPECIMEN SOURCE: SIGNIFICANT CHANGE UP
WBC # BLD: 19.59 K/UL — HIGH (ref 3.8–10.5)
WBC # FLD AUTO: 19.59 K/UL — HIGH (ref 3.8–10.5)

## 2017-07-03 PROCEDURE — 99233 SBSQ HOSP IP/OBS HIGH 50: CPT

## 2017-07-03 RX ORDER — DEXTROSE 50 % IN WATER 50 %
25 SYRINGE (ML) INTRAVENOUS ONCE
Qty: 0 | Refills: 0 | Status: DISCONTINUED | OUTPATIENT
Start: 2017-07-03 | End: 2017-08-05

## 2017-07-03 RX ORDER — DEXTROSE 50 % IN WATER 50 %
12.5 SYRINGE (ML) INTRAVENOUS ONCE
Qty: 0 | Refills: 0 | Status: DISCONTINUED | OUTPATIENT
Start: 2017-07-03 | End: 2017-08-05

## 2017-07-03 RX ORDER — SERTRALINE 25 MG/1
1 TABLET, FILM COATED ORAL
Qty: 0 | Refills: 0 | COMMUNITY

## 2017-07-03 RX ORDER — METFORMIN HYDROCHLORIDE 850 MG/1
10 TABLET ORAL
Qty: 0 | Refills: 0 | COMMUNITY

## 2017-07-03 RX ORDER — DIVALPROEX SODIUM 500 MG/1
2 TABLET, DELAYED RELEASE ORAL
Qty: 0 | Refills: 0 | COMMUNITY

## 2017-07-03 RX ORDER — VALSARTAN 80 MG/1
1 TABLET ORAL
Qty: 0 | Refills: 0 | COMMUNITY

## 2017-07-03 RX ORDER — LEVOTHYROXINE SODIUM 125 MCG
75 TABLET ORAL DAILY
Qty: 0 | Refills: 0 | Status: DISCONTINUED | OUTPATIENT
Start: 2017-07-03 | End: 2017-08-05

## 2017-07-03 RX ORDER — ATORVASTATIN CALCIUM 80 MG/1
10 TABLET, FILM COATED ORAL AT BEDTIME
Qty: 0 | Refills: 0 | Status: DISCONTINUED | OUTPATIENT
Start: 2017-07-03 | End: 2017-08-05

## 2017-07-03 RX ORDER — LEVOTHYROXINE SODIUM 125 MCG
1 TABLET ORAL
Qty: 0 | Refills: 0 | COMMUNITY

## 2017-07-03 RX ORDER — QUETIAPINE FUMARATE 200 MG/1
25 TABLET, FILM COATED ORAL EVERY 4 HOURS
Qty: 0 | Refills: 0 | Status: DISCONTINUED | OUTPATIENT
Start: 2017-07-03 | End: 2017-08-05

## 2017-07-03 RX ORDER — FERROUS SULFATE 325(65) MG
325 TABLET ORAL DAILY
Qty: 0 | Refills: 0 | Status: DISCONTINUED | OUTPATIENT
Start: 2017-07-03 | End: 2017-08-05

## 2017-07-03 RX ORDER — ATORVASTATIN CALCIUM 80 MG/1
1 TABLET, FILM COATED ORAL
Qty: 0 | Refills: 0 | COMMUNITY

## 2017-07-03 RX ORDER — DIVALPROEX SODIUM 500 MG/1
1 TABLET, DELAYED RELEASE ORAL
Qty: 0 | Refills: 0 | COMMUNITY

## 2017-07-03 RX ORDER — DONEPEZIL HYDROCHLORIDE 10 MG/1
1 TABLET, FILM COATED ORAL
Qty: 0 | Refills: 0 | COMMUNITY

## 2017-07-03 RX ORDER — SODIUM CHLORIDE 9 MG/ML
1000 INJECTION INTRAMUSCULAR; INTRAVENOUS; SUBCUTANEOUS
Qty: 0 | Refills: 0 | Status: DISCONTINUED | OUTPATIENT
Start: 2017-07-03 | End: 2017-07-07

## 2017-07-03 RX ORDER — ASPIRIN/CALCIUM CARB/MAGNESIUM 324 MG
1 TABLET ORAL
Qty: 0 | Refills: 0 | COMMUNITY

## 2017-07-03 RX ORDER — DIVALPROEX SODIUM 500 MG/1
250 TABLET, DELAYED RELEASE ORAL THREE TIMES A DAY
Qty: 0 | Refills: 0 | Status: DISCONTINUED | OUTPATIENT
Start: 2017-07-03 | End: 2017-07-08

## 2017-07-03 RX ORDER — SODIUM CHLORIDE 9 MG/ML
1000 INJECTION, SOLUTION INTRAVENOUS
Qty: 0 | Refills: 0 | Status: DISCONTINUED | OUTPATIENT
Start: 2017-07-03 | End: 2017-08-05

## 2017-07-03 RX ORDER — INSULIN LISPRO 100/ML
VIAL (ML) SUBCUTANEOUS AT BEDTIME
Qty: 0 | Refills: 0 | Status: DISCONTINUED | OUTPATIENT
Start: 2017-07-03 | End: 2017-08-05

## 2017-07-03 RX ORDER — FERROUS SULFATE 325(65) MG
1 TABLET ORAL
Qty: 0 | Refills: 0 | COMMUNITY

## 2017-07-03 RX ORDER — ALENDRONATE SODIUM 70 MG/1
1 TABLET ORAL
Qty: 0 | Refills: 0 | COMMUNITY

## 2017-07-03 RX ORDER — APIXABAN 2.5 MG/1
2.5 TABLET, FILM COATED ORAL
Qty: 0 | Refills: 0 | Status: DISCONTINUED | OUTPATIENT
Start: 2017-07-03 | End: 2017-07-16

## 2017-07-03 RX ORDER — INSULIN LISPRO 100/ML
VIAL (ML) SUBCUTANEOUS
Qty: 0 | Refills: 0 | Status: DISCONTINUED | OUTPATIENT
Start: 2017-07-03 | End: 2017-08-05

## 2017-07-03 RX ORDER — GLUCAGON INJECTION, SOLUTION 0.5 MG/.1ML
1 INJECTION, SOLUTION SUBCUTANEOUS ONCE
Qty: 0 | Refills: 0 | Status: DISCONTINUED | OUTPATIENT
Start: 2017-07-03 | End: 2017-08-05

## 2017-07-03 RX ORDER — DEXTROSE 50 % IN WATER 50 %
1 SYRINGE (ML) INTRAVENOUS ONCE
Qty: 0 | Refills: 0 | Status: DISCONTINUED | OUTPATIENT
Start: 2017-07-03 | End: 2017-08-05

## 2017-07-03 RX ORDER — METFORMIN HYDROCHLORIDE 850 MG/1
1 TABLET ORAL
Qty: 0 | Refills: 0 | COMMUNITY

## 2017-07-03 RX ORDER — SERTRALINE 25 MG/1
50 TABLET, FILM COATED ORAL DAILY
Qty: 0 | Refills: 0 | Status: DISCONTINUED | OUTPATIENT
Start: 2017-07-03 | End: 2017-08-05

## 2017-07-03 RX ADMIN — ATORVASTATIN CALCIUM 10 MILLIGRAM(S): 80 TABLET, FILM COATED ORAL at 22:25

## 2017-07-03 RX ADMIN — DIVALPROEX SODIUM 250 MILLIGRAM(S): 500 TABLET, DELAYED RELEASE ORAL at 22:25

## 2017-07-03 RX ADMIN — Medication 500 MILLIGRAM(S): at 05:53

## 2017-07-03 RX ADMIN — Medication 75 MICROGRAM(S): at 05:53

## 2017-07-03 RX ADMIN — QUETIAPINE FUMARATE 25 MILLIGRAM(S): 200 TABLET, FILM COATED ORAL at 22:25

## 2017-07-03 RX ADMIN — SODIUM CHLORIDE 75 MILLILITER(S): 9 INJECTION INTRAMUSCULAR; INTRAVENOUS; SUBCUTANEOUS at 09:25

## 2017-07-03 RX ADMIN — Medication 1: at 08:37

## 2017-07-03 RX ADMIN — Medication 1: at 17:34

## 2017-07-03 RX ADMIN — Medication 325 MILLIGRAM(S): at 11:40

## 2017-07-03 RX ADMIN — DIVALPROEX SODIUM 250 MILLIGRAM(S): 500 TABLET, DELAYED RELEASE ORAL at 14:46

## 2017-07-03 RX ADMIN — Medication 500 MILLIGRAM(S): at 22:25

## 2017-07-03 RX ADMIN — Medication 500 MILLIGRAM(S): at 14:46

## 2017-07-03 RX ADMIN — APIXABAN 2.5 MILLIGRAM(S): 2.5 TABLET, FILM COATED ORAL at 05:53

## 2017-07-03 RX ADMIN — QUETIAPINE FUMARATE 25 MILLIGRAM(S): 200 TABLET, FILM COATED ORAL at 17:03

## 2017-07-03 RX ADMIN — APIXABAN 2.5 MILLIGRAM(S): 2.5 TABLET, FILM COATED ORAL at 17:03

## 2017-07-03 RX ADMIN — SERTRALINE 50 MILLIGRAM(S): 25 TABLET, FILM COATED ORAL at 11:41

## 2017-07-03 RX ADMIN — DIVALPROEX SODIUM 250 MILLIGRAM(S): 500 TABLET, DELAYED RELEASE ORAL at 05:53

## 2017-07-03 NOTE — PATIENT PROFILE ADULT. - FALL HARM RISK TYPE OF ASSESSMENT
Ham was in the ED on 11/27/17 for constipation, ED did a rectal temperature and he had a bowel movement there.      Called mom to see how Ham is doing, LM on mom's voicemail to call back with worsening symptoms, questions, or concerns, otherwise if he is doing well and stooling normally mom does not need to return the call.   Admission

## 2017-07-03 NOTE — PROGRESS NOTE ADULT - SUBJECTIVE AND OBJECTIVE BOX
Patient is a 81y old  Female who presents with a chief complaint of Diarrhea (2017 00:19)      SUBJECTIVE / OVERNIGHT EVENTS : patient seen and examined by bedside, no acute distress noted, no diarrhea at this time           MEDICATIONS  (STANDING):  metroNIDAZOLE    Tablet 500 milliGRAM(s) Oral every 8 hours  apixaban 2.5 milliGRAM(s) Oral two times a day  diVALproex  milliGRAM(s) Oral three times a day  sertraline 50 milliGRAM(s) Oral daily  atorvastatin 10 milliGRAM(s) Oral at bedtime  ferrous    sulfate 325 milliGRAM(s) Oral daily  levothyroxine 75 MICROGram(s) Oral daily  insulin lispro (HumaLOG) corrective regimen sliding scale   SubCutaneous three times a day before meals  insulin lispro (HumaLOG) corrective regimen sliding scale   SubCutaneous at bedtime  dextrose 5%. 1000 milliLiter(s) (50 mL/Hr) IV Continuous <Continuous>  dextrose 50% Injectable 12.5 Gram(s) IV Push once  dextrose 50% Injectable 25 Gram(s) IV Push once  dextrose 50% Injectable 25 Gram(s) IV Push once  sodium chloride 0.9%. 1000 milliLiter(s) (75 mL/Hr) IV Continuous <Continuous>    MEDICATIONS  (PRN):  dextrose Gel 1 Dose(s) Oral once PRN Blood Glucose LESS THAN 70 milliGRAM(s)/deciliter  glucagon  Injectable 1 milliGRAM(s) IntraMuscular once PRN Glucose LESS THAN 70 milligrams/deciliter  QUEtiapine 25 milliGRAM(s) Oral every 4 hours PRN For agitation      Vital Signs Last 24 Hrs  T(C): 37.1 (17 @ 14:25), Max: 37.8 (17 @ 19:15)  HR: 82 (17 @ 14:25) (80 - 102)  BP: 118/70 (17 @ 14:25) (106/64 - 132/68)  RR: 17 (17 @ 14:25) (17 - 20)  SpO2: 98% (17 @ 14:25) (92% - 99%)  CAPILLARY BLOOD GLUCOSE  172 (2017 16:52)  147 (2017 11:58)  154 (2017 08:14)        I&O's Summary      PHYSICAL EXAM:  GENERAL: NAD,   HEAD:  Atraumatic, Normocephalic  EYES: EOMI, PERRLA, conjunctiva and sclera clear  NECK: Supple, No JVD  CHEST/LUNG: Clear to auscultation bilaterally; No wheeze  HEART: Regular rate and rhythm; No murmurs, rubs, or gallops  ABDOMEN: Soft, Nontender, Nondistended; Bowel sounds present  EXTREMITIES:  2+ Peripheral Pulses, No clubbing, cyanosis, or edema  PSYCH: AAOx3  NEUROLOGY: non-focal  SKIN: No rashes or lesions    LABS:                        8.2    19.59 )-----------( 840      ( 2017 06:10 )             25.2     07-03    133<L>  |  93<L>  |  14  ----------------------------<  135<H>  4.2   |  23  |  0.60    Ca    9.1      2017 06:10  Mg     1.7     07-03    TPro  8.0  /  Alb  2.6<L>  /  TBili  0.4  /  DBili  x   /  AST  40<H>  /  ALT  16  /  AlkPhos  96  07-02    PT/INR - ( 2017 17:53 )   PT: 14.2 SEC;   INR: 1.26          PTT - ( 2017 17:53 )  PTT:31.0 SEC      Urinalysis Basic - ( 2017 19:00 )    Color: YELLOW / Appearance: HAZY / S.013 / pH: 6.0  Gluc: NEGATIVE / Ketone: NEGATIVE  / Bili: NEGATIVE / Urobili: NORMAL E.U.   Blood: SMALL / Protein: 100 / Nitrite: NEGATIVE   Leuk Esterase: LARGE / RBC: 0-2 / WBC 5-10   Sq Epi: x / Non Sq Epi: x / Bacteria: FEW        RADIOLOGY & ADDITIONAL TESTS:    Imaging Personally Reviewed:    Consultant(s) Notes Reviewed:      Care Discussed with Consultants/Other Providers: Patient is a 81y old  Female who presents with a chief complaint of Diarrhea (2017 00:19)      SUBJECTIVE / OVERNIGHT EVENTS : patient seen and examined by bedside, no acute distress noted, no diarrhea at this time .H&P from this morning reviewed           MEDICATIONS  (STANDING):  metroNIDAZOLE    Tablet 500 milliGRAM(s) Oral every 8 hours  apixaban 2.5 milliGRAM(s) Oral two times a day  diVALproex  milliGRAM(s) Oral three times a day  sertraline 50 milliGRAM(s) Oral daily  atorvastatin 10 milliGRAM(s) Oral at bedtime  ferrous    sulfate 325 milliGRAM(s) Oral daily  levothyroxine 75 MICROGram(s) Oral daily  insulin lispro (HumaLOG) corrective regimen sliding scale   SubCutaneous three times a day before meals  insulin lispro (HumaLOG) corrective regimen sliding scale   SubCutaneous at bedtime  dextrose 5%. 1000 milliLiter(s) (50 mL/Hr) IV Continuous <Continuous>  dextrose 50% Injectable 12.5 Gram(s) IV Push once  dextrose 50% Injectable 25 Gram(s) IV Push once  dextrose 50% Injectable 25 Gram(s) IV Push once  sodium chloride 0.9%. 1000 milliLiter(s) (75 mL/Hr) IV Continuous <Continuous>    MEDICATIONS  (PRN):  dextrose Gel 1 Dose(s) Oral once PRN Blood Glucose LESS THAN 70 milliGRAM(s)/deciliter  glucagon  Injectable 1 milliGRAM(s) IntraMuscular once PRN Glucose LESS THAN 70 milligrams/deciliter  QUEtiapine 25 milliGRAM(s) Oral every 4 hours PRN For agitation      Vital Signs Last 24 Hrs  T(C): 37.1 (17 @ 14:25), Max: 37.8 (17 @ 19:15)  HR: 82 (17 @ 14:25) (80 - 102)  BP: 118/70 (17 @ 14:25) (106/64 - 132/68)  RR: 17 (17 @ 14:25) (17 - 20)  SpO2: 98% (17 @ 14:25) (92% - 99%)  CAPILLARY BLOOD GLUCOSE  172 (2017 16:52)  147 (2017 11:58)  154 (2017 08:14)        I&O's Summary      PHYSICAL EXAM:  GENERAL: NAD,   HEAD:  Atraumatic, Normocephalic  EYES: EOMI, PERRLA, conjunctiva and sclera clear  NECK: Supple, No JVD  CHEST/LUNG: Clear to auscultation bilaterally; No wheeze  HEART: Regular rate and rhythm; No murmurs, rubs, or gallops  ABDOMEN: Soft, Nontender, Nondistended; Bowel sounds present  EXTREMITIES:  Pulses, No clubbing, cyanosis, or edema  PSYCH: non verbal   NEUROLOGY: functional quadriplegia   SKIN: No rashes or lesions    LABS:                        8.2    19.59 )-----------( 840      ( 2017 06:10 )             25.2     07-03    133<L>  |  93<L>  |  14  ----------------------------<  135<H>  4.2   |  23  |  0.60    Ca    9.1      2017 06:10  Mg     1.7     07-03    TPro  8.0  /  Alb  2.6<L>  /  TBili  0.4  /  DBili  x   /  AST  40<H>  /  ALT  16  /  AlkPhos  96  07-02    PT/INR - ( 2017 17:53 )   PT: 14.2 SEC;   INR: 1.26          PTT - ( 2017 17:53 )  PTT:31.0 SEC      Urinalysis Basic - ( 2017 19:00 )    Color: YELLOW / Appearance: HAZY / S.013 / pH: 6.0  Gluc: NEGATIVE / Ketone: NEGATIVE  / Bili: NEGATIVE / Urobili: NORMAL E.U.   Blood: SMALL / Protein: 100 / Nitrite: NEGATIVE   Leuk Esterase: LARGE / RBC: 0-2 / WBC 5-10   Sq Epi: x / Non Sq Epi: x / Bacteria: FEW        RADIOLOGY & ADDITIONAL TESTS:    Imaging Personally Reviewed:    Consultant(s) Notes Reviewed:      Care Discussed with Consultants/Other Providers:

## 2017-07-03 NOTE — H&P ADULT - ASSESSMENT
80 y/o M with advanced dementia, DVT on eliquis, hypothyroidism, R ureteral obstruction 2/2 mass s/p nephrostomy tube p/w diarrhea ~2 weeks after treatment with levofloxacin for UTI.

## 2017-07-03 NOTE — PROGRESS NOTE ADULT - ASSESSMENT
82 y/o M with advanced dementia, DVT on eliquis, hypothyroidism, R ureteral obstruction 2/2 mass s/p nephrostomy tube p/w diarrhea ~2 weeks after treatment with levofloxacin for UTI.

## 2017-07-03 NOTE — H&P ADULT - HISTORY OF PRESENT ILLNESS
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  Pt's daughter reports that about 2 weeks ago, pt was treated for UTI with Levaquin and Diflucan.  4 Days ago, she developed green, watery diarrhea.  3 days ago, pt's daughter gave Imodium with resolution of diarrhea yesterday.  Diarrhea again returned today, and daughter gave Imodium this AM.  Pt had low grade temp.  No indication of abdominal pain per daughter.   No sick contacts or recent travel.      In the ED, pt was given metronidazole.  She has had no BM since arrival to ED.

## 2017-07-03 NOTE — H&P ADULT - PROBLEM SELECTOR PLAN 1
High suspicion for C diff given recent antibiotic use and leukocytosis.  Decreased frequency of diarrhea possibly 2/2 imodium use. No signs of toxic megacolon.  - will continue empiric Flagyl   - C diff PCR once pt passes stool

## 2017-07-03 NOTE — H&P ADULT - NSHPPHYSICALEXAM_GEN_ALL_CORE
Vital Signs Last 24 Hrs  T(C): 37.3 (02 Jul 2017 22:00), Max: 37.8 (02 Jul 2017 19:15)  T(F): 99.2 (02 Jul 2017 22:00), Max: 100.1 (02 Jul 2017 19:15)  HR: 80 (02 Jul 2017 22:00) (80 - 102)  BP: 106/64 (02 Jul 2017 22:00) (106/64 - 132/68)  BP(mean): --  RR: 17 (02 Jul 2017 22:00) (17 - 24)  SpO2: 92% (02 Jul 2017 22:00) (92% - 99%)    PHYSICAL EXAM:  GENERAL: No Acute Distress  HEAD:  Atraumatic, Normocephalic  EYES: conjunctiva and sclera clear  ENMT: Moist mucous membranes   NECK: Supple  CHEST/LUNG: Clear to auscultation bilaterally  HEART: Regular rate and rhythm; 3/6 systolic murmur.  ABDOMEN: Soft, Nontender, Nondistended; Bowel sounds normal  EXTREMITIES:   No clubbing, cyanosis, or pedal edema  VASCULAR: Normal DP pulses  PSYCH: Normal Affect, Normal Behavior  NEUROLOGY:   - Mental status nonverbal  SKIN: No rashes or lesions  MUSCULOSKELETAL: No joint swelling

## 2017-07-03 NOTE — H&P ADULT - NSHPLABSRESULTS_GEN_ALL_CORE
132<L>  |  91<L>  |  14  ----------------------------<  139<H>  5.5<H>   |  21<L>  |  0.61    Ca    9.7      2017 17:53    TPro  8.0  /  Alb  2.6<L>  /  TBili  0.4  /  DBili  x   /  AST  40<H>  /  ALT  16  /  AlkPhos  96                              8.8    20.94 )-----------( 855      ( 2017 17:53 )             26.3       PT/INR - ( 2017 17:53 )   PT: 14.2 SEC;   INR: 1.26          PTT - ( 2017 17:53 )  PTT:31.0 SEC    Urinalysis Basic - ( 2017 19:00 )    Color: YELLOW / Appearance: HAZY / S.013 / pH: 6.0  Gluc: NEGATIVE / Ketone: NEGATIVE  / Bili: NEGATIVE / Urobili: NORMAL E.U.   Blood: SMALL / Protein: 100 / Nitrite: NEGATIVE   Leuk Esterase: LARGE / RBC: 0-2 / WBC 5-10   Sq Epi: x / Non Sq Epi: x / Bacteria: FEW

## 2017-07-04 DIAGNOSIS — D72.829 ELEVATED WHITE BLOOD CELL COUNT, UNSPECIFIED: ICD-10-CM

## 2017-07-04 LAB
BACTERIA UR CULT: SIGNIFICANT CHANGE UP
BUN SERPL-MCNC: 10 MG/DL — SIGNIFICANT CHANGE UP (ref 7–23)
CALCIUM SERPL-MCNC: 9.1 MG/DL — SIGNIFICANT CHANGE UP (ref 8.4–10.5)
CHLORIDE SERPL-SCNC: 97 MMOL/L — LOW (ref 98–107)
CO2 SERPL-SCNC: 25 MMOL/L — SIGNIFICANT CHANGE UP (ref 22–31)
CREAT SERPL-MCNC: 0.51 MG/DL — SIGNIFICANT CHANGE UP (ref 0.5–1.3)
GLUCOSE SERPL-MCNC: 162 MG/DL — HIGH (ref 70–99)
HBA1C BLD-MCNC: 7.6 % — HIGH (ref 4–5.6)
HCT VFR BLD CALC: 24.5 % — LOW (ref 34.5–45)
HGB BLD-MCNC: 7.9 G/DL — LOW (ref 11.5–15.5)
MAGNESIUM SERPL-MCNC: 1.7 MG/DL — SIGNIFICANT CHANGE UP (ref 1.6–2.6)
MCHC RBC-ENTMCNC: 27.4 PG — SIGNIFICANT CHANGE UP (ref 27–34)
MCHC RBC-ENTMCNC: 32.2 % — SIGNIFICANT CHANGE UP (ref 32–36)
MCV RBC AUTO: 85.1 FL — SIGNIFICANT CHANGE UP (ref 80–100)
NRBC # FLD: 0 — SIGNIFICANT CHANGE UP
PLATELET # BLD AUTO: 788 K/UL — HIGH (ref 150–400)
PMV BLD: 10 FL — SIGNIFICANT CHANGE UP (ref 7–13)
POTASSIUM SERPL-MCNC: 3.5 MMOL/L — SIGNIFICANT CHANGE UP (ref 3.5–5.3)
POTASSIUM SERPL-SCNC: 3.5 MMOL/L — SIGNIFICANT CHANGE UP (ref 3.5–5.3)
RBC # BLD: 2.88 M/UL — LOW (ref 3.8–5.2)
RBC # FLD: 15.9 % — HIGH (ref 10.3–14.5)
SODIUM SERPL-SCNC: 134 MMOL/L — LOW (ref 135–145)
SPECIMEN SOURCE: SIGNIFICANT CHANGE UP
WBC # BLD: 17.02 K/UL — HIGH (ref 3.8–10.5)
WBC # FLD AUTO: 17.02 K/UL — HIGH (ref 3.8–10.5)

## 2017-07-04 PROCEDURE — 99233 SBSQ HOSP IP/OBS HIGH 50: CPT

## 2017-07-04 PROCEDURE — 74010: CPT | Mod: 26

## 2017-07-04 RX ADMIN — ATORVASTATIN CALCIUM 10 MILLIGRAM(S): 80 TABLET, FILM COATED ORAL at 22:59

## 2017-07-04 RX ADMIN — Medication 75 MICROGRAM(S): at 06:01

## 2017-07-04 RX ADMIN — Medication 500 MILLIGRAM(S): at 12:00

## 2017-07-04 RX ADMIN — Medication 1: at 17:04

## 2017-07-04 RX ADMIN — Medication 1: at 11:59

## 2017-07-04 RX ADMIN — SERTRALINE 50 MILLIGRAM(S): 25 TABLET, FILM COATED ORAL at 12:00

## 2017-07-04 RX ADMIN — Medication 500 MILLIGRAM(S): at 06:01

## 2017-07-04 RX ADMIN — Medication 500 MILLIGRAM(S): at 22:59

## 2017-07-04 RX ADMIN — QUETIAPINE FUMARATE 25 MILLIGRAM(S): 200 TABLET, FILM COATED ORAL at 22:58

## 2017-07-04 RX ADMIN — DIVALPROEX SODIUM 250 MILLIGRAM(S): 500 TABLET, DELAYED RELEASE ORAL at 06:03

## 2017-07-04 RX ADMIN — QUETIAPINE FUMARATE 25 MILLIGRAM(S): 200 TABLET, FILM COATED ORAL at 14:38

## 2017-07-04 RX ADMIN — QUETIAPINE FUMARATE 25 MILLIGRAM(S): 200 TABLET, FILM COATED ORAL at 05:59

## 2017-07-04 RX ADMIN — DIVALPROEX SODIUM 250 MILLIGRAM(S): 500 TABLET, DELAYED RELEASE ORAL at 12:01

## 2017-07-04 RX ADMIN — APIXABAN 2.5 MILLIGRAM(S): 2.5 TABLET, FILM COATED ORAL at 06:01

## 2017-07-04 RX ADMIN — QUETIAPINE FUMARATE 25 MILLIGRAM(S): 200 TABLET, FILM COATED ORAL at 18:39

## 2017-07-04 RX ADMIN — DIVALPROEX SODIUM 250 MILLIGRAM(S): 500 TABLET, DELAYED RELEASE ORAL at 22:58

## 2017-07-04 RX ADMIN — APIXABAN 2.5 MILLIGRAM(S): 2.5 TABLET, FILM COATED ORAL at 17:04

## 2017-07-04 RX ADMIN — Medication 325 MILLIGRAM(S): at 12:00

## 2017-07-04 RX ADMIN — Medication 1: at 08:38

## 2017-07-04 NOTE — PROGRESS NOTE ADULT - SUBJECTIVE AND OBJECTIVE BOX
Patient is a 81y old  Female who presents with a chief complaint of Diarrhea (2017 00:19)      SUBJECTIVE / OVERNIGHT EVENTS: patient seen and examined by bedside, no acute distress noted,  no diarrhea pt still did not have bm         MEDICATIONS  (STANDING):  metroNIDAZOLE    Tablet 500 milliGRAM(s) Oral every 8 hours  apixaban 2.5 milliGRAM(s) Oral two times a day  diVALproex  milliGRAM(s) Oral three times a day  sertraline 50 milliGRAM(s) Oral daily  atorvastatin 10 milliGRAM(s) Oral at bedtime  ferrous    sulfate 325 milliGRAM(s) Oral daily  levothyroxine 75 MICROGram(s) Oral daily  insulin lispro (HumaLOG) corrective regimen sliding scale   SubCutaneous three times a day before meals  insulin lispro (HumaLOG) corrective regimen sliding scale   SubCutaneous at bedtime  dextrose 5%. 1000 milliLiter(s) (50 mL/Hr) IV Continuous <Continuous>  dextrose 50% Injectable 12.5 Gram(s) IV Push once  dextrose 50% Injectable 25 Gram(s) IV Push once  dextrose 50% Injectable 25 Gram(s) IV Push once  sodium chloride 0.9%. 1000 milliLiter(s) (75 mL/Hr) IV Continuous <Continuous>    MEDICATIONS  (PRN):  dextrose Gel 1 Dose(s) Oral once PRN Blood Glucose LESS THAN 70 milliGRAM(s)/deciliter  glucagon  Injectable 1 milliGRAM(s) IntraMuscular once PRN Glucose LESS THAN 70 milligrams/deciliter  QUEtiapine 25 milliGRAM(s) Oral every 4 hours PRN For agitation      Vital Signs Last 24 Hrs  T(C): 36.7 (17 @ 17:14), Max: 37.5 (17 @ 05:57)  HR: 70 (17 @ 17:14) (70 - 78)  BP: 120/70 (17 @ 17:14) (118/60 - 124/67)  RR: 18 (17 @ 17:14) (16 - 18)  SpO2: 96% (17 @ 17:14) (95% - 99%)  CAPILLARY BLOOD GLUCOSE  168 (2017 16:51)  168 (2017 11:35)  173 (2017 08:28)  163 (2017 21:35)      PHYSICAL EXAM:  GENERAL: NAD,   HEAD:  Atraumatic, Normocephalic  EYES: EOMI, PERRLA, conjunctiva and sclera clear  NECK: Supple, No JVD  CHEST/LUNG: Clear to auscultation bilaterally; No wheeze  HEART: Regular rate and rhythm; No murmurs, rubs, or gallops  ABDOMEN: Soft, Nontender, Nondistended; Bowel sounds present  EXTREMITIES:  Pulses, No clubbing, cyanosis, or edema  PSYCH: non verbal   NEUROLOGY: functional quadriplegia   SKIN: No rashes or lesions      LABS:                        7.9    17.02 )-----------( 788      ( 2017 07:39 )             24.5     07-04    134<L>  |  97<L>  |  10  ----------------------------<  162<H>  3.5   |  25  |  0.51    Ca    9.1      2017 07:39  Mg     1.7     07-    TPro  8.0  /  Alb  2.6<L>  /  TBili  0.4  /  DBili  x   /  AST  40<H>  /  ALT  16  /  AlkPhos  96  07-02    PT/INR - ( 2017 17:53 )   PT: 14.2 SEC;   INR: 1.26          PTT - ( 2017 17:53 )  PTT:31.0 SEC      Urinalysis Basic - ( 2017 19:00 )    Color: YELLOW / Appearance: HAZY / S.013 / pH: 6.0  Gluc: NEGATIVE / Ketone: NEGATIVE  / Bili: NEGATIVE / Urobili: NORMAL E.U.   Blood: SMALL / Protein: 100 / Nitrite: NEGATIVE   Leuk Esterase: LARGE / RBC: 0-2 / WBC 5-10   Sq Epi: x / Non Sq Epi: x / Bacteria: FEW        RADIOLOGY & ADDITIONAL TESTS:    Imaging Personally Reviewed:    Consultant(s) Notes Reviewed:      Care Discussed with Consultants/Other Providers:

## 2017-07-04 NOTE — PROGRESS NOTE ADULT - PROBLEM SELECTOR PLAN 1
High suspicion for C diff given recent antibiotic use and leukocytosis.  Decreased frequency of diarrhea possibly 2/2 imodium use. No signs of toxic megacolon.  - will continue empiric Flagyl   - C diff PCR once pt passes stool  -pt still with no bm, will check abdominal xray

## 2017-07-05 LAB
BUN SERPL-MCNC: 7 MG/DL — SIGNIFICANT CHANGE UP (ref 7–23)
CALCIUM SERPL-MCNC: 8.9 MG/DL — SIGNIFICANT CHANGE UP (ref 8.4–10.5)
CHLORIDE SERPL-SCNC: 98 MMOL/L — SIGNIFICANT CHANGE UP (ref 98–107)
CO2 SERPL-SCNC: 24 MMOL/L — SIGNIFICANT CHANGE UP (ref 22–31)
CREAT SERPL-MCNC: 0.45 MG/DL — LOW (ref 0.5–1.3)
GLUCOSE SERPL-MCNC: 140 MG/DL — HIGH (ref 70–99)
HCT VFR BLD CALC: 23.4 % — LOW (ref 34.5–45)
HGB BLD-MCNC: 7.6 G/DL — LOW (ref 11.5–15.5)
MAGNESIUM SERPL-MCNC: 1.6 MG/DL — SIGNIFICANT CHANGE UP (ref 1.6–2.6)
MCHC RBC-ENTMCNC: 27.7 PG — SIGNIFICANT CHANGE UP (ref 27–34)
MCHC RBC-ENTMCNC: 32.5 % — SIGNIFICANT CHANGE UP (ref 32–36)
MCV RBC AUTO: 85.4 FL — SIGNIFICANT CHANGE UP (ref 80–100)
NRBC # FLD: 0 — SIGNIFICANT CHANGE UP
PLATELET # BLD AUTO: 846 K/UL — HIGH (ref 150–400)
PMV BLD: 10.3 FL — SIGNIFICANT CHANGE UP (ref 7–13)
POTASSIUM SERPL-MCNC: 3.5 MMOL/L — SIGNIFICANT CHANGE UP (ref 3.5–5.3)
POTASSIUM SERPL-SCNC: 3.5 MMOL/L — SIGNIFICANT CHANGE UP (ref 3.5–5.3)
RBC # BLD: 2.74 M/UL — LOW (ref 3.8–5.2)
RBC # FLD: 15.9 % — HIGH (ref 10.3–14.5)
SODIUM SERPL-SCNC: 135 MMOL/L — SIGNIFICANT CHANGE UP (ref 135–145)
WBC # BLD: 18.09 K/UL — HIGH (ref 3.8–10.5)
WBC # FLD AUTO: 18.09 K/UL — HIGH (ref 3.8–10.5)

## 2017-07-05 PROCEDURE — 99222 1ST HOSP IP/OBS MODERATE 55: CPT

## 2017-07-05 PROCEDURE — 99233 SBSQ HOSP IP/OBS HIGH 50: CPT

## 2017-07-05 PROCEDURE — 74177 CT ABD & PELVIS W/CONTRAST: CPT | Mod: 26

## 2017-07-05 RX ADMIN — APIXABAN 2.5 MILLIGRAM(S): 2.5 TABLET, FILM COATED ORAL at 05:58

## 2017-07-05 RX ADMIN — DIVALPROEX SODIUM 250 MILLIGRAM(S): 500 TABLET, DELAYED RELEASE ORAL at 21:22

## 2017-07-05 RX ADMIN — APIXABAN 2.5 MILLIGRAM(S): 2.5 TABLET, FILM COATED ORAL at 17:31

## 2017-07-05 RX ADMIN — DIVALPROEX SODIUM 250 MILLIGRAM(S): 500 TABLET, DELAYED RELEASE ORAL at 15:07

## 2017-07-05 RX ADMIN — DIVALPROEX SODIUM 250 MILLIGRAM(S): 500 TABLET, DELAYED RELEASE ORAL at 05:58

## 2017-07-05 RX ADMIN — Medication 325 MILLIGRAM(S): at 12:26

## 2017-07-05 RX ADMIN — Medication 1: at 17:32

## 2017-07-05 RX ADMIN — QUETIAPINE FUMARATE 25 MILLIGRAM(S): 200 TABLET, FILM COATED ORAL at 21:22

## 2017-07-05 RX ADMIN — Medication 1: at 08:54

## 2017-07-05 RX ADMIN — Medication 500 MILLIGRAM(S): at 05:58

## 2017-07-05 RX ADMIN — Medication 500 MILLIGRAM(S): at 21:22

## 2017-07-05 RX ADMIN — Medication 500 MILLIGRAM(S): at 15:07

## 2017-07-05 RX ADMIN — QUETIAPINE FUMARATE 25 MILLIGRAM(S): 200 TABLET, FILM COATED ORAL at 06:08

## 2017-07-05 RX ADMIN — Medication 75 MICROGRAM(S): at 05:58

## 2017-07-05 RX ADMIN — QUETIAPINE FUMARATE 25 MILLIGRAM(S): 200 TABLET, FILM COATED ORAL at 15:07

## 2017-07-05 RX ADMIN — SERTRALINE 50 MILLIGRAM(S): 25 TABLET, FILM COATED ORAL at 12:27

## 2017-07-05 RX ADMIN — Medication 1: at 12:27

## 2017-07-05 RX ADMIN — ATORVASTATIN CALCIUM 10 MILLIGRAM(S): 80 TABLET, FILM COATED ORAL at 21:22

## 2017-07-05 NOTE — CONSULT NOTE ADULT - SUBJECTIVE AND OBJECTIVE BOX
HPI:  82 y/o F with HTN, DM, advanced dementia, DVT on Eliquis, hypothyroidism, hydronephrosis 2/2 R renal pelvis mass s/p nephrostomy tube p/w diarrhea.     She had nephrostomy tube place in march.    About two weeks ago, she was treated for  infection with fluconazole/ levaquin.     This weekend she developed diarrhea.  She took immodium. It got better.       Pt had low grade temp.  No indication of abdominal pain per daughter.   No sick contacts or recent travel.      In the ED, pt was given metronidazole.      Still has not had BM>     PAST MEDICAL & SURGICAL HISTORY:  Renal mass, right  Kidney mass  HTN (hypertension)  Diabetes  Alzheimer's dementia with behavioral disturbance  Alzheimer disease  T2DM (type 2 diabetes mellitus)  HTN (hypertension)  Hypothyroid  Gallstones  History of kidney stones  S/P cholecystectomy      Allergies    adhesives (Rash)  No Known Drug Allergies    Intolerances        ANTIMICROBIALS:  metroNIDAZOLE    Tablet 500 every 8 hours      OTHER MEDS:  apixaban 2.5 milliGRAM(s) Oral two times a day  diVALproex  milliGRAM(s) Oral three times a day  sertraline 50 milliGRAM(s) Oral daily  atorvastatin 10 milliGRAM(s) Oral at bedtime  ferrous    sulfate 325 milliGRAM(s) Oral daily  levothyroxine 75 MICROGram(s) Oral daily  insulin lispro (HumaLOG) corrective regimen sliding scale   SubCutaneous three times a day before meals  insulin lispro (HumaLOG) corrective regimen sliding scale   SubCutaneous at bedtime  dextrose 5%. 1000 milliLiter(s) IV Continuous <Continuous>  dextrose Gel 1 Dose(s) Oral once PRN  dextrose 50% Injectable 12.5 Gram(s) IV Push once  dextrose 50% Injectable 25 Gram(s) IV Push once  dextrose 50% Injectable 25 Gram(s) IV Push once  glucagon  Injectable 1 milliGRAM(s) IntraMuscular once PRN  sodium chloride 0.9%. 1000 milliLiter(s) IV Continuous <Continuous>  QUEtiapine 25 milliGRAM(s) Oral every 4 hours PRN      SOCIAL HISTORY: lives with daughter, no tobacco, no IDU    FAMILY HISTORY:  No pertinent family history in first degree relatives  No pertinent family history in first degree relatives      REVIEW OF SYSTEMS  [  ] ROS unobtainable because:    [ x ] All other systems negative except as noted below:	    Constitutional:  [x ] fever [ ] weight loss  Skin:  [ ] rash [ ] phlebitis	  Eyes: [ ] icterus [ ] inflammation	  ENMT: [ ] discharge [ ] thrush [ ] ulcers [ ] exudates  Respiratory: [ ] dyspnea [ ] hemoptysis [ ] cough [ ] sputum	  Cardiovascular:  [ ] chest pain [ ] palpitations [ ] edema	  Gastrointestinal:  [ ] nausea [ ] vomiting [x ] diarrhea [ ] constipation [ ] pain	  Genitourinary:  [ ] dysuria [ ] frequency [ ] hematuria [ ] discharge [ ] flank pain  Musculoskeletal:  [ ] myalgias [ ] arthralgias [ ] arthritis	  Neurological:  [ ] headache [ ] seizures	  Psychiatric:  [ ] anxiety [ ] depression	  Hematology/Lymphatics:  [ ] lymphadenopathy  Endocrine:  [ ] adrenal [ ] thyroid  Allergic/Immunologic:	 [ ] transplant [ ] seasonal    PHYSICAL EXAM:  General: [x ] non-toxic  HEAD/EYES: [ ] PERRL [x ] white sclera [ ] icterus  ENT:  [ ] normal [ x] supple [ ] thrush [ ] pharyngeal exudate  Cardiovascular:   [ ] murmur [x ] normal [ ] PPM/AICD  Respiratory:  [ x] clear to ausculation bilaterally  GI:  [x ] soft, non-tender, normal bowel sounds  :  [ ] sanchez [x ] no CVA tenderness   Musculoskeletal:  [ ] no synovitis  Neurologic:  [ ] non-focal exam   Skin:  [x ] no rash  Lymph: [ ] no lymphadenopathy  Psychiatric:  [ ] appropriate affect [ ] alert & oriented  Lines:  [x ] no phlebitis [ ] central line          Drug Dosing Weight  Height (cm): 157.48 (05 Jul 2017 08:53)  Weight (kg): 60.9 (05 Jul 2017 08:53)  BMI (kg/m2): 24.6 (05 Jul 2017 08:53)  BSA (m2): 1.61 (05 Jul 2017 08:53)    Vital Signs Last 24 Hrs  T(F): 98.3 (07-05-17 @ 05:55), Max: 100.1 (07-02-17 @ 19:15)    Vital Signs Last 24 Hrs  HR: 77 (07-05-17 @ 05:55) (70 - 78)  BP: 109/60 (07-05-17 @ 05:55) (109/60 - 127/68)  RR: 18 (07-05-17 @ 05:55)  SpO2: 95% (07-05-17 @ 05:55) (95% - 97%)  Wt(kg): --                          7.6    18.09 )-----------( 846      ( 05 Jul 2017 06:51 )             23.4       07-05    135  |  98  |  7   ----------------------------<  140<H>  3.5   |  24  |  0.45<L>    Ca    8.9      05 Jul 2017 06:51  Mg     1.6     07-05            MICROBIOLOGY:  Urine cx 7/2: NGTD  Blood culture 7/2: NGTD  RADIOLOGY:

## 2017-07-05 NOTE — PROGRESS NOTE ADULT - SUBJECTIVE AND OBJECTIVE BOX
Patient is a 81y old  Female who presents with a chief complaint of Diarrhea (03 Jul 2017 00:19)      SUBJECTIVE / OVERNIGHT EVENTS: patient seen and examined by bedside, no acute distress noted, still no stool  sample obtained         MEDICATIONS  (STANDING):  metroNIDAZOLE    Tablet 500 milliGRAM(s) Oral every 8 hours  apixaban 2.5 milliGRAM(s) Oral two times a day  diVALproex  milliGRAM(s) Oral three times a day  sertraline 50 milliGRAM(s) Oral daily  atorvastatin 10 milliGRAM(s) Oral at bedtime  ferrous    sulfate 325 milliGRAM(s) Oral daily  levothyroxine 75 MICROGram(s) Oral daily  insulin lispro (HumaLOG) corrective regimen sliding scale   SubCutaneous three times a day before meals  insulin lispro (HumaLOG) corrective regimen sliding scale   SubCutaneous at bedtime  dextrose 5%. 1000 milliLiter(s) (50 mL/Hr) IV Continuous <Continuous>  dextrose 50% Injectable 12.5 Gram(s) IV Push once  dextrose 50% Injectable 25 Gram(s) IV Push once  dextrose 50% Injectable 25 Gram(s) IV Push once  sodium chloride 0.9%. 1000 milliLiter(s) (75 mL/Hr) IV Continuous <Continuous>    MEDICATIONS  (PRN):  dextrose Gel 1 Dose(s) Oral once PRN Blood Glucose LESS THAN 70 milliGRAM(s)/deciliter  glucagon  Injectable 1 milliGRAM(s) IntraMuscular once PRN Glucose LESS THAN 70 milligrams/deciliter  QUEtiapine 25 milliGRAM(s) Oral every 4 hours PRN For agitation      Vital Signs Last 24 Hrs  T(C): 36.8 (07-05-17 @ 05:55), Max: 36.8 (07-05-17 @ 05:55)  HR: 77 (07-05-17 @ 05:55) (70 - 78)  BP: 109/60 (07-05-17 @ 05:55) (109/60 - 127/68)  RR: 18 (07-05-17 @ 05:55) (16 - 18)  SpO2: 95% (07-05-17 @ 05:55) (95% - 97%)  CAPILLARY BLOOD GLUCOSE  169 (05 Jul 2017 16:24)  152 (05 Jul 2017 12:25)  162 (05 Jul 2017 08:33)  162 (04 Jul 2017 22:20)        I&O's Summary          PHYSICAL EXAM:  GENERAL: NAD,   HEAD:  Atraumatic, Normocephalic  EYES: EOMI, PERRLA, conjunctiva and sclera clear  NECK: Supple, No JVD  CHEST/LUNG: Clear to auscultation bilaterally; No wheeze  HEART: Regular rate and rhythm; No murmurs, rubs, or gallops  ABDOMEN: Soft, Nontender, Nondistended; Bowel sounds present  EXTREMITIES:  Pulses, No clubbing, cyanosis, or edema  PSYCH: non verbal   NEUROLOGY: functional quadriplegia   SKIN: No rashes or lesions      LABS:                        7.6    18.09 )-----------( 846      ( 05 Jul 2017 06:51 )             23.4     07-05    135  |  98  |  7   ----------------------------<  140<H>  3.5   |  24  |  0.45<L>    Ca    8.9      05 Jul 2017 06:51  Mg     1.6     07-05                RADIOLOGY & ADDITIONAL TESTS:< from: Xray Abdomen 2 Views (07.04.17 @ 18:22) >  EXAM:  RAD ABDOMEN (2 VIEWS)          < end of copied text >  < from: Xray Abdomen 2 Views (07.04.17 @ 18:22) >  Gaseous distention of the colon particularly the transverse colon but no   obstruction. Right-sided nephroureteral stent is seen with the tip   overlying the bladder. No masses or pathologic calcifications.      COMPARISON:  None available      IMPRESSION:  Nephroureteral stents in place with no focal abdominal   disease identified on plain radiograph.            < end of copied text >      Imaging Personally Reviewed:   Consultant(s) Notes Reviewed:   ID     Care Discussed with Consultants/Other Providers:ID

## 2017-07-05 NOTE — PROGRESS NOTE ADULT - PROBLEM SELECTOR PLAN 2
trending down, Ucx and blood cx negative   continue flagyl  monitor CBC etiology not clear   Ucx and blood cx negative   continue flagyl  -Ct A/p as pt had h/o renal mass on prior ct  monitor CBC

## 2017-07-05 NOTE — PROGRESS NOTE ADULT - PROBLEM SELECTOR PLAN 1
High suspicion for C diff given recent antibiotic use and leukocytosis.  Decreased frequency of diarrhea possibly 2/2 imodium use. No signs of toxic megacolon.  - will continue empiric Flagyl   - C diff PCR once pt passes stool  -pt still with no bm,  abdominal xray unremarkable   -ID consult requested for persistent leucocytosis  - recommend  if loose stool recurrs, check c diff, stool culture, giardia, Otherwise finish 10 days of empiric flagyl. High suspicion for C diff given recent antibiotic use and leukocytosis.  Decreased frequency of diarrhea possibly 2/2 imodium use. No signs of toxic megacolon.  - will continue empiric Flagyl   - C diff PCR once pt passes stool  -pt still with no bm,  abdominal xray unremarkable   -ID consult requested for persistent leucocytosis  - recommend  if loose stool recurrs, check c diff, stool culture, giardia, Otherwise finish 10 days of empiric flagyl.  -Ct A/p as pt had h/o renal mass on prior ct

## 2017-07-06 DIAGNOSIS — N28.89 OTHER SPECIFIED DISORDERS OF KIDNEY AND URETER: ICD-10-CM

## 2017-07-06 PROCEDURE — 99232 SBSQ HOSP IP/OBS MODERATE 35: CPT

## 2017-07-06 PROCEDURE — 99233 SBSQ HOSP IP/OBS HIGH 50: CPT

## 2017-07-06 RX ADMIN — Medication 500 MILLIGRAM(S): at 05:15

## 2017-07-06 RX ADMIN — SERTRALINE 50 MILLIGRAM(S): 25 TABLET, FILM COATED ORAL at 12:59

## 2017-07-06 RX ADMIN — QUETIAPINE FUMARATE 25 MILLIGRAM(S): 200 TABLET, FILM COATED ORAL at 13:00

## 2017-07-06 RX ADMIN — Medication 500 MILLIGRAM(S): at 13:30

## 2017-07-06 RX ADMIN — Medication: at 09:05

## 2017-07-06 RX ADMIN — APIXABAN 2.5 MILLIGRAM(S): 2.5 TABLET, FILM COATED ORAL at 17:22

## 2017-07-06 RX ADMIN — QUETIAPINE FUMARATE 25 MILLIGRAM(S): 200 TABLET, FILM COATED ORAL at 21:51

## 2017-07-06 RX ADMIN — Medication 2: at 17:22

## 2017-07-06 RX ADMIN — DIVALPROEX SODIUM 250 MILLIGRAM(S): 500 TABLET, DELAYED RELEASE ORAL at 05:15

## 2017-07-06 RX ADMIN — Medication 75 MICROGRAM(S): at 05:15

## 2017-07-06 RX ADMIN — Medication 500 MILLIGRAM(S): at 21:51

## 2017-07-06 RX ADMIN — DIVALPROEX SODIUM 250 MILLIGRAM(S): 500 TABLET, DELAYED RELEASE ORAL at 21:51

## 2017-07-06 RX ADMIN — Medication: at 12:59

## 2017-07-06 RX ADMIN — ATORVASTATIN CALCIUM 10 MILLIGRAM(S): 80 TABLET, FILM COATED ORAL at 21:51

## 2017-07-06 RX ADMIN — DIVALPROEX SODIUM 250 MILLIGRAM(S): 500 TABLET, DELAYED RELEASE ORAL at 13:30

## 2017-07-06 RX ADMIN — APIXABAN 2.5 MILLIGRAM(S): 2.5 TABLET, FILM COATED ORAL at 05:15

## 2017-07-06 RX ADMIN — Medication 325 MILLIGRAM(S): at 12:59

## 2017-07-06 NOTE — SWALLOW BEDSIDE ASSESSMENT ADULT - SWALLOW EVAL: RECOMMENDED FEEDING/EATING TECHNIQUES
position upright (90 degrees)/crush medication (when feasible)/Feed when alert, encourage oral intake; verbal cues to swallow/allow for swallow between intakes/oral hygiene/maintain upright posture during/after eating for 30 mins

## 2017-07-06 NOTE — PROGRESS NOTE ADULT - PROBLEM SELECTOR PLAN 1
High suspicion for C diff given recent antibiotic use and leukocytosis.  Decreased frequency of diarrhea possibly 2/2 imodium use. No signs of toxic megacolon.  - will continue empiric Flagyl   - C diff PCR once pt passes stool  -pt still with no bm,  abdominal xray unremarkable   -ID consult requested for persistent leucocytosis  - recommend  if loose stool recurrs, check c diff, stool culture, giardia, Otherwise finish 10 days of empiric flagyl.  -Ct A/p noted , Marked improvement in hydronephrosis on the right.   However, ill-defined low density collection in the right posterior   pararenal space has progressed in the interim.   urology consult requested

## 2017-07-06 NOTE — SWALLOW BEDSIDE ASSESSMENT ADULT - SWALLOW EVAL: DIAGNOSIS
Patient presents with functional oral and pharyngeal stage swallowing mechanism characterized by intermittent anterior loss out of the oral cavity with cup sip drinking likely exacerbated by decline/refusal to continue with oral intake; adequate bolus manipulation and transport for liquids with adequate oral clearance. There is laryngeal elevation upon palpation, initiation of the pharyngeal swallow. There were no overt signs of impaired airway protection.  It should be noted that patient will turn away or expectorate PO trials.

## 2017-07-06 NOTE — PROGRESS NOTE ADULT - SUBJECTIVE AND OBJECTIVE BOX
Follow Up:      Inverval History/ROS:Patient is a 81y old  Female who presents with a chief complaint of Diarrhea (03 Jul 2017 00:19)  Her diarrhea has resolved.    She has been afebrile.      Allergies    adhesives (Rash)  No Known Drug Allergies    Intolerances        ANTIMICROBIALS:  metroNIDAZOLE    Tablet 500 every 8 hours      OTHER MEDS:  apixaban 2.5 milliGRAM(s) Oral two times a day  diVALproex  milliGRAM(s) Oral three times a day  sertraline 50 milliGRAM(s) Oral daily  atorvastatin 10 milliGRAM(s) Oral at bedtime  ferrous    sulfate 325 milliGRAM(s) Oral daily  levothyroxine 75 MICROGram(s) Oral daily  insulin lispro (HumaLOG) corrective regimen sliding scale   SubCutaneous three times a day before meals  insulin lispro (HumaLOG) corrective regimen sliding scale   SubCutaneous at bedtime  dextrose 5%. 1000 milliLiter(s) IV Continuous <Continuous>  dextrose Gel 1 Dose(s) Oral once PRN  dextrose 50% Injectable 12.5 Gram(s) IV Push once  dextrose 50% Injectable 25 Gram(s) IV Push once  dextrose 50% Injectable 25 Gram(s) IV Push once  glucagon  Injectable 1 milliGRAM(s) IntraMuscular once PRN  sodium chloride 0.9%. 1000 milliLiter(s) IV Continuous <Continuous>  QUEtiapine 25 milliGRAM(s) Oral every 4 hours PRN      Vital Signs Last 24 Hrs  T(C): 36.9 (06 Jul 2017 05:13), Max: 36.9 (06 Jul 2017 05:13)  T(F): 98.5 (06 Jul 2017 05:13), Max: 98.5 (06 Jul 2017 05:13)  HR: 72 (06 Jul 2017 05:13) (72 - 76)  BP: 108/65 (06 Jul 2017 05:13) (107/60 - 118/62)  BP(mean): --  RR: 18 (06 Jul 2017 05:13) (16 - 18)  SpO2: 95% (06 Jul 2017 05:13) (95% - 97%)    PHYSICAL EXAM:  General: [x ] non-toxic  HEAD/EYES: [ ] PERRL [ x] white sclera [ ] icterus  ENT:  [ ] normal [ ] supple [ ] thrush [ ] pharyngeal exudate  Cardiovascular:   [ ] murmur [ x] normal [ ] PPM/AICD  Respiratory:  [x ] clear to ausculation bilaterally  GI:  [x ] soft, non-tender, normal bowel sounds  :  [ ] sanchez [ ] no CVA tenderness   Musculoskeletal:  [ ] no synovitis  Neurologic: [ ] non-focal exam   Skin:  [ ] no rash  Lymph: [ ] no lymphadenopathy  Psychiatric:  [ ] appropriate affect [ ] alert & oriented  Lines:  [ x] no phlebitis [ ] central line                                7.6    18.09 )-----------( 846      ( 05 Jul 2017 06:51 )             23.4       07-05    135  |  98  |  7   ----------------------------<  140<H>  3.5   |  24  |  0.45<L>    Ca    8.9      05 Jul 2017 06:51  Mg     1.6     07-05            MICROBIOLOGY:    RADIOLOGY:

## 2017-07-06 NOTE — CONSULT NOTE ADULT - PROBLEM SELECTOR RECOMMENDATION 9
-Recommend working up infectious diarrhea as cause of pt's leukocytosis  -Send C diff PCR  -R renal mass and R para-renal collections known from previous  -Patient has multiple medical comorbidities, and is not a good surgical candidate   -No acute  intervention  -Follow up with Dr. Kurtz as an outpatient as needed -Recommend working up infectious diarrhea as cause of pt's leukocytosis  -Send C diff PCR  -R renal mass and R para-renal collections known from previous  -Patient has multiple medical comorbidities, and is not a good surgical candidate   -Has appt for R NT change 7/24, consider change while inpatient vs keeping that appointment  -No acute  intervention  -Follow up with Dr. Kurtz as an outpatient as needed

## 2017-07-06 NOTE — SWALLOW BEDSIDE ASSESSMENT ADULT - COMMENTS
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  Pt's daughter reports that about 2 weeks ago, pt was treated for UTI with Levaquin and Diflucan.  4 Days ago, she developed green, watery diarrhea.  3 days ago, pt's daughter gave Imodium with resolution of diarrhea yesterday.  Diarrhea again returned today, and daughter gave Imodium this AM.  Pt had low grade temp.  No indication of abdominal pain per daughter.   No sick contacts or recent travel. 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  Pt's daughter reports that about 2 weeks ago, pt was treated for UTI with Levaquin and Diflucan.  4 Days ago, she developed green, watery diarrhea.  3 days ago, pt's daughter gave Imodium with resolution of diarrhea yesterday.  Diarrhea again returned today, and daughter gave Imodium this AM.  Pt had low grade temp.  No indication of abdominal pain per daughter.   No sick contacts or recent travel.    Patient seen at bedside, alert state. Patient's daughter (Valarie) is present. Patient is [place] speaking only. Daughter provides translation as per patient preference given patient remains receptive to daughter's voice.

## 2017-07-06 NOTE — PROGRESS NOTE ADULT - ASSESSMENT
81 year old female with dementia with right sided hydronephrosis with a possible right renal pelvis mass.   Now with diarrhea and leukocytosis.    If the patient has loose stool, check c diff. Otherwise finish 10 days of empiric flagyl.    I f loose stool recurrs, check c diff, stool culture, giardia.     CT with renal pelvis mass- consider urology evaluation / biopsy.

## 2017-07-06 NOTE — PROGRESS NOTE ADULT - SUBJECTIVE AND OBJECTIVE BOX
Patient is a 81y old  Female who presents with a chief complaint of Diarrhea (03 Jul 2017 00:19)      SUBJECTIVE / OVERNIGHT EVENTS: patient seen and examined by bedside, no acute events over night, no acute distress noted          MEDICATIONS  (STANDING):  metroNIDAZOLE    Tablet 500 milliGRAM(s) Oral every 8 hours  apixaban 2.5 milliGRAM(s) Oral two times a day  diVALproex  milliGRAM(s) Oral three times a day  sertraline 50 milliGRAM(s) Oral daily  atorvastatin 10 milliGRAM(s) Oral at bedtime  ferrous    sulfate 325 milliGRAM(s) Oral daily  levothyroxine 75 MICROGram(s) Oral daily  insulin lispro (HumaLOG) corrective regimen sliding scale   SubCutaneous three times a day before meals  insulin lispro (HumaLOG) corrective regimen sliding scale   SubCutaneous at bedtime  dextrose 5%. 1000 milliLiter(s) (50 mL/Hr) IV Continuous <Continuous>  dextrose 50% Injectable 12.5 Gram(s) IV Push once  dextrose 50% Injectable 25 Gram(s) IV Push once  dextrose 50% Injectable 25 Gram(s) IV Push once  sodium chloride 0.9%. 1000 milliLiter(s) (75 mL/Hr) IV Continuous <Continuous>    MEDICATIONS  (PRN):  dextrose Gel 1 Dose(s) Oral once PRN Blood Glucose LESS THAN 70 milliGRAM(s)/deciliter  glucagon  Injectable 1 milliGRAM(s) IntraMuscular once PRN Glucose LESS THAN 70 milligrams/deciliter  QUEtiapine 25 milliGRAM(s) Oral every 4 hours PRN For agitation      Vital Signs Last 24 Hrs  T(C): 36.9 (07-06-17 @ 05:13), Max: 36.9 (07-06-17 @ 05:13)  HR: 72 (07-06-17 @ 05:13) (72 - 72)  BP: 108/65 (07-06-17 @ 05:13) (107/60 - 108/65)  RR: 18 (07-06-17 @ 05:13) (16 - 18)  SpO2: 95% (07-06-17 @ 05:13) (95% - 97%)  CAPILLARY BLOOD GLUCOSE  213 (06 Jul 2017 12:04)  208 (06 Jul 2017 08:51)  196 (05 Jul 2017 21:22)  169 (05 Jul 2017 16:24)        I&O's Summary  PHYSICAL EXAM:  GENERAL: NAD,   HEAD:  Atraumatic, Normocephalic  EYES: EOMI, PERRLA, conjunctiva and sclera clear  NECK: Supple, No JVD  CHEST/LUNG: Clear to auscultation bilaterally; No wheeze  HEART: Regular rate and rhythm; No murmurs, rubs, or gallops  ABDOMEN: Soft, Nontender, Nondistended; Bowel sounds present  EXTREMITIES:  Pulses, No clubbing, cyanosis, or edema  PSYCH: minimally  verbal   NEUROLOGY: functional quadriplegia   SKIN: No rashes or lesions          LABS:                        7.6    18.09 )-----------( 846      ( 05 Jul 2017 06:51 )             23.4     07-05    135  |  98  |  7   ----------------------------<  140<H>  3.5   |  24  |  0.45<L>    Ca    8.9      05 Jul 2017 06:51  Mg     1.6     07-05                RADIOLOGY & ADDITIONAL TESTS:< from: CT Abdomen and Pelvis w/ IV Cont (07.05.17 @ 20:21) >  CT ABDOMEN AND PELVIS IC        PROCEDURE DATE:  Jul 5 2017         INTERPRETATION:  CLINICAL INFORMATION: Diarrhea and leukocytosis.    COMPARISON: 2/14/2017.    PROCEDURE:   CT of the Abdomen and Pelvis was performed with intravenous contrast.   Intravenous contrast: 90 ml Omnipaque 350. 10 ml discarded.  Oral contrast: positive contrast was not administered.  Sagittal and coronal reformats were performed.    FINDINGS:    LOWER CHEST: Trace bilateral pleural effusions.        LIVER: No focal lesion.      BILE DUCTS: Normal caliber.  GALLBLADDER: Unremarkable.  SPLEEN: Unremarkable.  PANCREAS: Atrophic, likely from chronic pancreatitis.  ADRENALS: Unremarkable.  KIDNEYS/URETERS: Percutaneous right nephrostomy catheter and a right   nephroureteral stent in place. Improvement in right hydronephrosis.   Ill-defined low-density collections arising from the right kidney and   involving the right psoas and the right posterior pararenal space,   progressed from before. The largest nowmeasures 4.3 x 5.8 cm. There is   associated adenopathy in the right retroperitoneum. The right renal   pelvis again demonstrates heterogeneous material. The collecting systems   are excreting contrast bilaterally which makes evaluation for renal   stones suboptimal. There is however a dominant nonobstructing stone in   the right kidney of 9 mm. Multiple tiny low-attenuation lesions in the   left kidney, many of which are cysts and the tiny ones are too small to   further characterize by this exam.    URINARY BLADDER: Unremarkable.  PELVIC ORGANS: The uterus and adnexa are normal in appearance.    BOWEL: Normal in course and caliber without obstruction. Appendix is   normal.  PERITONEUM/RETROPERITONEUM: No pneumoperitoneum, ascites, or   lymphadenopathy.  VESSELS:  No evidence of aortic aneurysm. Marked vascular calcifications.  BONES/SOFT TISSUES: No abnormality.    IMPRESSION:  Interval placement of right percutaneous ostomy catheter and a right   nephroureteral stent. Marked improvement in hydronephrosis on the right.   However, ill-defined low density collection in the right posterior   pararenal space has progressed in the interim. Soft tissue density again   identified in the right renal pelvis. Associated adenopathy. High density   material is identified within the right ureter adjacent to the stent and   is uncertain if this represents contrast or stones.      < end of copied text >      Imaging Personally Reviewed:    Consultant(s) Notes Reviewed:      Care Discussed with Consultants/Other Providers:

## 2017-07-06 NOTE — PROGRESS NOTE ADULT - PROBLEM SELECTOR PLAN 2
etiology not clear   Ucx and blood cx negative   continue flagyl  -Ct A/p Marked improvement in hydronephrosis on the right.   However, ill-defined low density collection in the right posterior   pararenal space has progressed in the interim.   urology consult requested  monitor CBC

## 2017-07-06 NOTE — CONSULT NOTE ADULT - SUBJECTIVE AND OBJECTIVE BOX
HPI:  Patient is a 81y Female who presented to the ER with a 3 days history of diarrhea.  Pt has a hx of hydronephrosis 2/2 R renal pelvis mass s/p nephrostomy tube.    CT scan obtained demonstrated improvement in hydronephrosis on the right. The ill-defined low density collection in the right posterior   pararenal space has progressed in the interim. Soft tissue density again identified in the right renal pelvis w/associated adenopathy.     R renal pelvic mass and R pararenal collections are known from previous.  It was previously determined no intervention would be undertaken on the mass or the collections, outside of the placement of the nephrostomy tube.       PAST MEDICAL & SURGICAL HISTORY:  Renal mass, right  Kidney mass  HTN (hypertension)  Diabetes  Alzheimer's dementia with behavioral disturbance  Alzheimer disease  T2DM (type 2 diabetes mellitus)  HTN (hypertension)  Hypothyroid  Gallstones  History of kidney stones  S/P cholecystectomy      FAMILY HISTORY:  No pertinent family history in first degree relatives    Allergies    adhesives (Rash)    REVIEW OF SYSTEMS: Pertinent positives and negatives as stated in HPI, otherwise negative    Vital signs  T(C): 36.9 (07-06-17 @ 05:13), Max: 36.9 (07-06-17 @ 05:13)  HR: 72 (07-06-17 @ 05:13)  BP: 108/65 (07-06-17 @ 05:13)  SpO2: 95% (07-06-17 @ 05:13)    Physical Exam  Gen: NAD, A&O x0  Pulm: No respiratory distress, no subcostal retractions  CV: RRR, no JVD  Abd: Soft, NT, ND  MSK: No edema present    LABS:    07-05 @ 06:51    WBC 18.09 / Hct 23.4  / SCr 0.45     07-05    135  |  98  |  7   ----------------------------<  140<H>  3.5   |  24  |  0.45<L>    Ca    8.9      05 Jul 2017 06:51  Mg     1.6     07-05      Urine Cx: NGTD  Blood Cx: NGTD  C diff: not yet sent    RADIOLOGY:    IMPRESSION:  Interval placement of right percutaneous ostomy catheter and a right   nephroureteral stent. Marked improvement in hydronephrosis on the right.   However, ill-defined low density collection in the right posterior   pararenal space has progressed in the interim. Soft tissue density again identified in the right renal pelvis. Associated adenopathy. High density material is identified within the right ureter adjacent to the stent and is uncertain if this represents contrast or stones. HPI:  Patient is a 81y Female who presented to the ER with a 3 days history of diarrhea.  Pt has a hx of hydronephrosis 2/2 R renal pelvis mass s/p nephrostomy tube.    CT scan obtained demonstrated improvement in hydronephrosis on the right. The ill-defined low density collection in the right posterior pararenal space has progressed in the interim. Soft tissue density again identified in the right renal pelvis w/associated adenopathy.     R renal pelvic mass and R pararenal collections are known from previous.  It was previously determined no intervention would be undertaken on the mass or the collections, outside of the placement of the nephrostomy tube.       PAST MEDICAL & SURGICAL HISTORY:  Renal mass, right  Kidney mass  HTN (hypertension)  Diabetes  Alzheimer's dementia with behavioral disturbance  Alzheimer disease  T2DM (type 2 diabetes mellitus)  HTN (hypertension)  Hypothyroid  Gallstones  History of kidney stones  S/P cholecystectomy      FAMILY HISTORY:  No pertinent family history in first degree relatives    Allergies    adhesives (Rash)    REVIEW OF SYSTEMS: Pertinent positives and negatives as stated in HPI, otherwise negative    Vital signs  T(C): 36.9 (07-06-17 @ 05:13), Max: 36.9 (07-06-17 @ 05:13)  HR: 72 (07-06-17 @ 05:13)  BP: 108/65 (07-06-17 @ 05:13)  SpO2: 95% (07-06-17 @ 05:13)    Physical Exam  Gen: NAD, A&O x0  Pulm: No respiratory distress, no subcostal retractions  CV: RRR, no JVD  Abd: Soft, NT, ND  MSK: No edema present    LABS:    07-05 @ 06:51    WBC 18.09 / Hct 23.4  / SCr 0.45     07-05    135  |  98  |  7   ----------------------------<  140<H>  3.5   |  24  |  0.45<L>    Ca    8.9      05 Jul 2017 06:51  Mg     1.6     07-05      Urine Cx: NGTD  Blood Cx: NGTD  C diff: not yet sent    RADIOLOGY:    IMPRESSION:  Interval placement of right percutaneous ostomy catheter and a right   nephroureteral stent. Marked improvement in hydronephrosis on the right.   However, ill-defined low density collection in the right posterior   pararenal space has progressed in the interim. Soft tissue density again identified in the right renal pelvis. Associated adenopathy. High density material is identified within the right ureter adjacent to the stent and is uncertain if this represents contrast or stones. HPI:  Patient is a 81y Female who presented to the ER with a 3 days history of diarrhea.  Pt has a hx of hydronephrosis 2/2 R renal pelvis mass s/p nephrostomy tube.    CT scan obtained demonstrated improvement in hydronephrosis on the right. The ill-defined low density collection in the right posterior pararenal space has progressed in the interim. Soft tissue density again identified in the right renal pelvis w/associated adenopathy.     R renal pelvic mass and R pararenal collections are known from previous.  It was previously determined no intervention would be undertaken on the mass or the collections, outside of the placement of the nephrostomy tube.     PAST MEDICAL & SURGICAL HISTORY:  Renal mass, right  Kidney mass  HTN (hypertension)  Diabetes  Alzheimer's dementia with behavioral disturbance  Alzheimer disease  T2DM (type 2 diabetes mellitus)  HTN (hypertension)  Hypothyroid  Gallstones  History of kidney stones  S/P cholecystectomy      FAMILY HISTORY:  No pertinent family history in first degree relatives    Allergies    adhesives (Rash)    REVIEW OF SYSTEMS: Pertinent positives and negatives as stated in HPI, otherwise negative    Vital signs  T(C): 36.9 (07-06-17 @ 05:13), Max: 36.9 (07-06-17 @ 05:13)  HR: 72 (07-06-17 @ 05:13)  BP: 108/65 (07-06-17 @ 05:13)  SpO2: 95% (07-06-17 @ 05:13)    Physical Exam  Gen: NAD, A&O x0  Pulm: No respiratory distress, no subcostal retractions  CV: RRR, no JVD  Abd: Soft, NT, ND  MSK: No edema present    LABS:    07-05 @ 06:51    WBC 18.09 / Hct 23.4  / SCr 0.45     07-05    135  |  98  |  7   ----------------------------<  140<H>  3.5   |  24  |  0.45<L>    Ca    8.9      05 Jul 2017 06:51  Mg     1.6     07-05      Urine Cx: NGTD  Blood Cx: NGTD  C diff: not yet sent    RADIOLOGY:    IMPRESSION:  Interval placement of right percutaneous ostomy catheter and a right   nephroureteral stent. Marked improvement in hydronephrosis on the right.   However, ill-defined low density collection in the right posterior   pararenal space has progressed in the interim. Soft tissue density again identified in the right renal pelvis. Associated adenopathy. High density material is identified within the right ureter adjacent to the stent and is uncertain if this represents contrast or stones. HPI:  Patient is a 81y Female who presented to the ER with a 3 days history of diarrhea.  Pt has a hx of hydronephrosis 2/2 R renal pelvis mass s/p nephrostomy tube.    CT scan obtained demonstrated improvement in hydronephrosis on the right. The ill-defined low density collection in the right posterior pararenal space has progressed in the interim. Soft tissue density again identified in the right renal pelvis w/associated adenopathy.     R renal pelvic mass and R pararenal collections are known from previous.  It was previously determined no intervention would be undertaken on the mass or the collections, outside of the placement of the nephrostomy tube.     NT placed in March, capped 6 weeks ago per daughter.  Scheduled for R NT change on 7/24.    PAST MEDICAL & SURGICAL HISTORY:  Renal mass, right  Kidney mass  HTN (hypertension)  Diabetes  Alzheimer's dementia with behavioral disturbance  Alzheimer disease  T2DM (type 2 diabetes mellitus)  HTN (hypertension)  Hypothyroid  Gallstones  History of kidney stones  S/P cholecystectomy      FAMILY HISTORY:  No pertinent family history in first degree relatives    Allergies    adhesives (Rash)    REVIEW OF SYSTEMS: Pertinent positives and negatives as stated in HPI, otherwise negative    Vital signs  T(C): 36.9 (07-06-17 @ 05:13), Max: 36.9 (07-06-17 @ 05:13)  HR: 72 (07-06-17 @ 05:13)  BP: 108/65 (07-06-17 @ 05:13)  SpO2: 95% (07-06-17 @ 05:13)    Physical Exam  Gen: NAD, A&O x0  Pulm: No respiratory distress, no subcostal retractions  CV: RRR, no JVD  Abd: Soft, NT, ND  MSK: No edema present    LABS:    07-05 @ 06:51    WBC 18.09 / Hct 23.4  / SCr 0.45     07-05    135  |  98  |  7   ----------------------------<  140<H>  3.5   |  24  |  0.45<L>    Ca    8.9      05 Jul 2017 06:51  Mg     1.6     07-05      Urine Cx: NGTD  Blood Cx: NGTD  C diff: not yet sent    RADIOLOGY:    IMPRESSION:  Interval placement of right percutaneous ostomy catheter and a right   nephroureteral stent. Marked improvement in hydronephrosis on the right.   However, ill-defined low density collection in the right posterior   pararenal space has progressed in the interim. Soft tissue density again identified in the right renal pelvis. Associated adenopathy. High density material is identified within the right ureter adjacent to the stent and is uncertain if this represents contrast or stones.

## 2017-07-07 ENCOUNTER — TRANSCRIPTION ENCOUNTER (OUTPATIENT)
Age: 82
End: 2017-07-07

## 2017-07-07 DIAGNOSIS — R54 AGE-RELATED PHYSICAL DEBILITY: ICD-10-CM

## 2017-07-07 DIAGNOSIS — R10.9 UNSPECIFIED ABDOMINAL PAIN: ICD-10-CM

## 2017-07-07 DIAGNOSIS — Z51.5 ENCOUNTER FOR PALLIATIVE CARE: ICD-10-CM

## 2017-07-07 DIAGNOSIS — R53.81 OTHER MALAISE: ICD-10-CM

## 2017-07-07 LAB
BACTERIA BLD CULT: SIGNIFICANT CHANGE UP
BACTERIA BLD CULT: SIGNIFICANT CHANGE UP
BUN SERPL-MCNC: 10 MG/DL — SIGNIFICANT CHANGE UP (ref 7–23)
CALCIUM SERPL-MCNC: 8.8 MG/DL — SIGNIFICANT CHANGE UP (ref 8.4–10.5)
CHLORIDE SERPL-SCNC: 98 MMOL/L — SIGNIFICANT CHANGE UP (ref 98–107)
CO2 SERPL-SCNC: 26 MMOL/L — SIGNIFICANT CHANGE UP (ref 22–31)
CREAT SERPL-MCNC: 0.47 MG/DL — LOW (ref 0.5–1.3)
GLUCOSE SERPL-MCNC: 155 MG/DL — HIGH (ref 70–99)
HCT VFR BLD CALC: 24.3 % — LOW (ref 34.5–45)
HGB BLD-MCNC: 7.7 G/DL — LOW (ref 11.5–15.5)
MCHC RBC-ENTMCNC: 27.1 PG — SIGNIFICANT CHANGE UP (ref 27–34)
MCHC RBC-ENTMCNC: 31.7 % — LOW (ref 32–36)
MCV RBC AUTO: 85.6 FL — SIGNIFICANT CHANGE UP (ref 80–100)
NRBC # FLD: 0 — SIGNIFICANT CHANGE UP
PLATELET # BLD AUTO: 835 K/UL — HIGH (ref 150–400)
PMV BLD: 10.7 FL — SIGNIFICANT CHANGE UP (ref 7–13)
POTASSIUM SERPL-MCNC: 3.6 MMOL/L — SIGNIFICANT CHANGE UP (ref 3.5–5.3)
POTASSIUM SERPL-SCNC: 3.6 MMOL/L — SIGNIFICANT CHANGE UP (ref 3.5–5.3)
RBC # BLD: 2.84 M/UL — LOW (ref 3.8–5.2)
RBC # FLD: 16.2 % — HIGH (ref 10.3–14.5)
SODIUM SERPL-SCNC: 137 MMOL/L — SIGNIFICANT CHANGE UP (ref 135–145)
WBC # BLD: 19.62 K/UL — HIGH (ref 3.8–10.5)
WBC # FLD AUTO: 19.62 K/UL — HIGH (ref 3.8–10.5)

## 2017-07-07 PROCEDURE — 99222 1ST HOSP IP/OBS MODERATE 55: CPT

## 2017-07-07 PROCEDURE — 99497 ADVNCD CARE PLAN 30 MIN: CPT | Mod: 25

## 2017-07-07 PROCEDURE — 99223 1ST HOSP IP/OBS HIGH 75: CPT

## 2017-07-07 PROCEDURE — 99232 SBSQ HOSP IP/OBS MODERATE 35: CPT

## 2017-07-07 PROCEDURE — 99233 SBSQ HOSP IP/OBS HIGH 50: CPT

## 2017-07-07 RX ORDER — ACETAMINOPHEN 500 MG
650 TABLET ORAL EVERY 6 HOURS
Qty: 0 | Refills: 0 | Status: DISCONTINUED | OUTPATIENT
Start: 2017-07-07 | End: 2017-08-05

## 2017-07-07 RX ORDER — POLYETHYLENE GLYCOL 3350 17 G/17G
17 POWDER, FOR SOLUTION ORAL DAILY
Qty: 0 | Refills: 0 | Status: DISCONTINUED | OUTPATIENT
Start: 2017-07-07 | End: 2017-08-05

## 2017-07-07 RX ORDER — SODIUM CHLORIDE 9 MG/ML
1000 INJECTION INTRAMUSCULAR; INTRAVENOUS; SUBCUTANEOUS
Qty: 0 | Refills: 0 | Status: DISCONTINUED | OUTPATIENT
Start: 2017-07-07 | End: 2017-08-05

## 2017-07-07 RX ORDER — POLYETHYLENE GLYCOL 3350 17 G/17G
17 POWDER, FOR SOLUTION ORAL DAILY
Qty: 0 | Refills: 0 | Status: DISCONTINUED | OUTPATIENT
Start: 2017-07-07 | End: 2017-07-07

## 2017-07-07 RX ORDER — SODIUM CHLORIDE 9 MG/ML
1000 INJECTION, SOLUTION INTRAVENOUS
Qty: 0 | Refills: 0 | Status: DISCONTINUED | OUTPATIENT
Start: 2017-07-07 | End: 2017-07-07

## 2017-07-07 RX ADMIN — DIVALPROEX SODIUM 250 MILLIGRAM(S): 500 TABLET, DELAYED RELEASE ORAL at 15:25

## 2017-07-07 RX ADMIN — APIXABAN 2.5 MILLIGRAM(S): 2.5 TABLET, FILM COATED ORAL at 17:17

## 2017-07-07 RX ADMIN — Medication 1: at 09:07

## 2017-07-07 RX ADMIN — Medication 500 MILLIGRAM(S): at 21:35

## 2017-07-07 RX ADMIN — SODIUM CHLORIDE 75 MILLILITER(S): 9 INJECTION INTRAMUSCULAR; INTRAVENOUS; SUBCUTANEOUS at 05:20

## 2017-07-07 RX ADMIN — Medication 500 MILLIGRAM(S): at 05:20

## 2017-07-07 RX ADMIN — APIXABAN 2.5 MILLIGRAM(S): 2.5 TABLET, FILM COATED ORAL at 05:20

## 2017-07-07 RX ADMIN — Medication 75 MICROGRAM(S): at 05:20

## 2017-07-07 RX ADMIN — Medication 500 MILLIGRAM(S): at 15:26

## 2017-07-07 RX ADMIN — SERTRALINE 50 MILLIGRAM(S): 25 TABLET, FILM COATED ORAL at 11:37

## 2017-07-07 RX ADMIN — Medication 650 MILLIGRAM(S): at 16:46

## 2017-07-07 RX ADMIN — Medication 650 MILLIGRAM(S): at 17:16

## 2017-07-07 RX ADMIN — QUETIAPINE FUMARATE 25 MILLIGRAM(S): 200 TABLET, FILM COATED ORAL at 16:46

## 2017-07-07 RX ADMIN — QUETIAPINE FUMARATE 25 MILLIGRAM(S): 200 TABLET, FILM COATED ORAL at 11:37

## 2017-07-07 RX ADMIN — DIVALPROEX SODIUM 250 MILLIGRAM(S): 500 TABLET, DELAYED RELEASE ORAL at 21:35

## 2017-07-07 RX ADMIN — Medication 1 DROP(S): at 05:22

## 2017-07-07 RX ADMIN — DIVALPROEX SODIUM 250 MILLIGRAM(S): 500 TABLET, DELAYED RELEASE ORAL at 05:20

## 2017-07-07 RX ADMIN — POLYETHYLENE GLYCOL 3350 17 GRAM(S): 17 POWDER, FOR SOLUTION ORAL at 15:25

## 2017-07-07 RX ADMIN — Medication 325 MILLIGRAM(S): at 11:37

## 2017-07-07 RX ADMIN — Medication 2: at 17:17

## 2017-07-07 RX ADMIN — QUETIAPINE FUMARATE 25 MILLIGRAM(S): 200 TABLET, FILM COATED ORAL at 23:59

## 2017-07-07 NOTE — DISCHARGE NOTE ADULT - PLAN OF CARE
Please continue lid hygiene and drop applications as needed. Follow up with Opthalmology [phone] Comfort care Possibly due to underlying malignancy.    Continue with care as per hospice as no further work-up requested. Received flagyl x 10 days.    Resolved. Continue synthroid. Continue home medications. Repeat doppler negative.  No need for anticoagulation at this time. Continue with Seroquel. Resolved Presents with persistent elevated WBC count. May be secondarily to renal mass/ malignancy. No biopsy or surgical intervention needed. Continue with comfort care as per hospice, full code as per requested by family. Treated prior to hospitalization with antibiotics for urinary tract infection. Treated with Flagyl x 10 days. Follow up with PCP in 1-2 weeks regarding management. May be secondarily to trauma from procedure. Resolved. Monitor for urine output and signs of blood in urine. Follow up with PCP in 1-2 weeks regarding management. Continue Synthroid. HgbA1c 7.6%. Continue with insulin on a sliding scale. Follow up with PCP in 1-2 weeks regarding management. Repeat Doppler negative.  No need for anticoagulation at this time. Continue with home medications. Treated prior to hospitalization with antibiotics for urinary tract infection. Treated with Flagyl x 10 days. Resolved. Follow up with PCP in 1-2 weeks regarding management.

## 2017-07-07 NOTE — CONSULT NOTE ADULT - PROBLEM SELECTOR RECOMMENDATION 6
Discussed with patient advanced directives  Pt has 6 daughters/sons family will like time to discuss further decisions  Currently they agree that they would not like to pursue further workup for renal mass, would like to continue with ongoing treatment.  Will continue to f/u with family

## 2017-07-07 NOTE — PROGRESS NOTE ADULT - ATTENDING COMMENTS
On call attending covering for Dr Kurtz. Pt seen and examined with resident. Above note edited as appropriate. PCN may be source of leukocytosis but unclear (especially in setting of negative UCx). Spent long time with family discussing risks and benefits and ultimately it appears they prefer to have Dr Garcia perform exchange above all else. They would rather wait until scheduled date.

## 2017-07-07 NOTE — CONSULT NOTE ADULT - SUBJECTIVE AND OBJECTIVE BOX
HPI:  81 year old Female with Medical History of HTN, DM, advanced dementia, DVT on Eliquis, hypothyroidism, hydronephrosis 2/2 R renal pelvis mass s/p nephrostomy tube p/w diarrhea.  Pt's daughter reports that about 2 weeks ago, pt was treated for UTI with Levaquin and Diflucan.  4 Days ago, she developed green, watery diarrhea.  3 days ago, pt's daughter gave Imodium with resolution of diarrhea yesterday.  Diarrhea again returned today, and daughter gave Imodium this AM.  Pt had low grade temp.  No indication of abdominal pain per daughter.   No sick contacts or recent travel.    In the ED, pt was given metronidazole. CT abdomen showed R perinephric collection suspicious for abscess. Pending GOC to proceed with drainage/bx, per ID recommendations.    Patient seen and examined at bedside, sleeping comfortably. Per family member patient lives with daughter (Emily)     PERTINENT PM/SXH:   Renal mass, right  Kidney mass  HTN (hypertension)  Diabetes  Alzheimer's dementia with behavioral disturbance  Alzheimer disease  T2DM (type 2 diabetes mellitus)  HTN (hypertension)  Hypothyroid    Gallstones  History of kidney stones  S/P cholecystectomy    SOCIAL HISTORY:   Significant other/partner:  [ ] YES  [ ] NO               Children:  [ ] YES  [ ] NO                   Jewish/Spirituality:  Substance hx:  [ ] YES   [ ] NO                   Tobacco hx:  [ ] YES  [ ] NO                       Alcohol hx: [ ] YES  [ ] NO         Home Opioid hx:  [ ] YES  [ ] NO   Living Situation: [ ] Home  [ ] Long term care  [ ] Rehab [ ] Other    FAMILY HISTORY:  No pertinent family history in first degree relatives  No pertinent family history in first degree relatives    [ ] Family history non-contributory     BASELINE (I)ADLs (prior to admission):  [place]: [ ] total  [ ] moderate [ ] dependent    ADVANCE DIRECTIVES:    DNR   MOLST  [ ] YES [ ] NO                      [ ] Completed  Health Care Proxy [ ] YES  [ ] NO   [ ] Completed  Living Will  [ ] YES [ ] NO             [ ] Surrogate  [ ] HCP  [ ] Guardian:                                                                  Phone#:    Allergies    adhesives (Rash)  No Known Drug Allergies    Intolerances        MEDICATIONS  (STANDING):  metroNIDAZOLE    Tablet 500 milliGRAM(s) Oral every 8 hours  apixaban 2.5 milliGRAM(s) Oral two times a day  diVALproex  milliGRAM(s) Oral three times a day  sertraline 50 milliGRAM(s) Oral daily  atorvastatin 10 milliGRAM(s) Oral at bedtime  ferrous    sulfate 325 milliGRAM(s) Oral daily  levothyroxine 75 MICROGram(s) Oral daily  insulin lispro (HumaLOG) corrective regimen sliding scale   SubCutaneous three times a day before meals  insulin lispro (HumaLOG) corrective regimen sliding scale   SubCutaneous at bedtime  dextrose 5%. 1000 milliLiter(s) (50 mL/Hr) IV Continuous <Continuous>  dextrose 50% Injectable 12.5 Gram(s) IV Push once  dextrose 50% Injectable 25 Gram(s) IV Push once  dextrose 50% Injectable 25 Gram(s) IV Push once  sodium chloride 0.9%. 1000 milliLiter(s) (50 mL/Hr) IV Continuous <Continuous>    MEDICATIONS  (PRN):  dextrose Gel 1 Dose(s) Oral once PRN Blood Glucose LESS THAN 70 milliGRAM(s)/deciliter  glucagon  Injectable 1 milliGRAM(s) IntraMuscular once PRN Glucose LESS THAN 70 milligrams/deciliter  QUEtiapine 25 milliGRAM(s) Oral every 4 hours PRN For agitation  artificial  tears Solution 1 Drop(s) Both EYES four times a day PRN Dry Eyes  polyethylene glycol 3350 17 Gram(s) Oral daily PRN Constipation      PRESENT SYMPTOMS:  Source: [ ] Patient   [ ] Family   [ ] Team     Pain:                        [ ] No [ ] Yes             [ ] Mild [ ] Moderate [ ] Severe    Onset -  Location -  Duration -  Character -  Alleviating/Aggravating -  Radiation -  Timing -      Dyspnea:                [ ] No [ ] Yes             [ ] Mild [ ] Moderate [ ] Severe    Anxiety:                  [ ] No [ ] Yes             [ ] Mild [ ] Moderate [ ] Severe    Fatigue:                  [ ] No [ ] Yes             [ ] Mild [ ] Moderate [ ] Severe    Nausea:                  [ ] No [ ] Yes             [ ] Mild [ ] Moderate [ ] Severe    Loss of appetite:   [ ] No [ ] Yes             [ ] Mild [ ] Moderate [ ] Severe    Constipation:        [ ] No [ ] Yes             [ ] Mild [ ] Moderate [ ] Severe    Other Symptoms:  [ ] All other review of systems negative   [ ] Unable to obtain due to poor mentation     Karnofsky Performance Score/Palliative Performance Status Version 2:         %    PHYSICAL EXAM:  Vital Signs Last 24 Hrs  T(C): 36.7 (07 Jul 2017 13:27), Max: 37.7 (06 Jul 2017 21:41)  T(F): 98.1 (07 Jul 2017 13:27), Max: 99.9 (06 Jul 2017 21:41)  HR: 76 (07 Jul 2017 13:27) (71 - 77)  BP: 127/60 (07 Jul 2017 13:27) (112/52 - 127/60)  BP(mean): --  RR: 19 (07 Jul 2017 13:27) (19 - 19)  SpO2: 95% (07 Jul 2017 13:27) (93% - 97%) I&O's Summary      General:  [ ] Alert  [ ] Oriented x      [ ] Lethargic  [ ] Agitated   [ ] Cachexia   [ ] Unarousable  [ ] Verbal  [ ] Non-Verbal    HEENT:  [ ] Normal   [ ] Dry mouth   [ ] ET Tube    [ ] Trach  [ ] Oral lesions    Lungs:   [ ] Clear [ ] Tachypnea  [ ] Audible excessive secretions   [ ] Rhonchi        [ ] Right [ ] Left [ ] Bilateral  [ ] Crackles        [ ] Right [ ] Left [ ] Bilateral  [ ] Wheezing     [ ] Right [ ] Left [ ] Bilateral    Cardiovascular:  [ ] Regular [ ] Irregular [ ] Tachycardia   [ ] Bradycardia  [ ] Murmur [ ] Other    Abdomen: [ ] Soft  [ ] Distended   [ ] +BS  [ ] Non tender [ ] Tender  [ ]PEG   [ ]OGT/ NGT   Last BM:       Genitourinary: [ ] Normal [ ] Incontinent   [ ] Oliguria/Anuria   [ ] Burrows    Musculoskeletal:  [ ] Normal   [ ] Weakness  [ ] Bedbound/Wheelchair bound [ ] Edema    Neurological: [ ] No focal deficits  [ ] Cognitive impairment  [ ] Dysphagia [ ] Dysarthria [ ] Paresis [ ] Other     Skin: [ ] Normal   [ ] Pressure ulcer(s)                  [ ] Rash    LABS:                        7.7    19.62 )-----------( 835      ( 07 Jul 2017 07:00 )             24.3     07-07    137  |  98  |  10  ----------------------------<  155<H>  3.6   |  26  |  0.47<L>    Ca    8.8      07 Jul 2017 07:00            Shock: [ ] Septic [ ] Cardiogenic [ ] Neurologic [ ] Hypovolemic  Vasopressors x   Inotrophs x     Protein Calorie Malnutrition: [ ] Mild [ ] Moderate [ ] Severe    Oral Intake: [ ] Unable/mouth care only [ ] Minimal [ ] Moderate [ ] Full Capability  Diet: [ ] NPO [ ] Tube feeds [ ] TPN [ ] Other     RADIOLOGY & ADDITIONAL STUDIES:    REFERRALS:   [ ] Chaplaincy  [ ] Hospice  [ ] Child Life  [ ] Social Work  [ ] Case management [ ] Holistic Therapy HPI:  81 year old Female with Medical History of HTN, DM, advanced dementia, DVT on Eliquis, hypothyroidism, hydronephrosis 2/2 R renal pelvis mass s/p nephrostomy tube p/w diarrhea.  Pt's daughter reports that about 2 weeks ago, pt was treated for UTI with Levaquin and Diflucan.  4 Days ago, she developed green, watery diarrhea.  3 days ago, pt's daughter gave Imodium with resolution of diarrhea yesterday.  Diarrhea again returned today, and daughter gave Imodium this AM.  Pt had low grade temp.  No indication of abdominal pain per daughter.   No sick contacts or recent travel.    In the ED, pt was given metronidazole. CT abdomen showed R perinephric collection suspicious for abscess. Pending GOC to proceed with drainage/bx, per ID recommendations.    Patient seen and examined at bedside, sleeping comfortably. Per family member patient lives with daughter (Emily) and , has only HHA in the morning. Patient has had a significant decline over the past couple of months. Mainly bed bound now.       PERTINENT PM/SXH:   Renal mass, right  Kidney mass  HTN (hypertension)  Diabetes  Alzheimer's dementia with behavioral disturbance  Alzheimer disease  T2DM (type 2 diabetes mellitus)  HTN (hypertension)  Hypothyroid    Gallstones  History of kidney stones  S/P cholecystectomy    SOCIAL HISTORY:   Significant other/partner:  [x] YES  [ ] NO               Children:  [x] YES  [ ] NO                   Yazdanism/Spirituality: Yes  Substance hx:  [ ] YES   [x] NO                   Tobacco hx:  [ ] YES  [x] NO                       Alcohol hx: [ ] YES  [x] NO         Home Opioid hx:  [ ] YES  [x] NO   Living Situation: [x] Home  [ ] Long term care  [ ] Rehab [ ] Other    FAMILY HISTORY:  No pertinent family history in first degree relatives  No pertinent family history in first degree relatives    [x] Family history non-contributory     BASELINE (I)ADLs (prior to admission):  [place]: [ ] total  [ ] moderate [ ] dependent    ADVANCE DIRECTIVES:    Full Code  MOLST  [ ] YES [X] NO                      [ ] Completed  Health Care Proxy [ ] YES  [X] NO   [ ] Completed  Living Will  [ ] YES [X] NO             [X] Surrogate  [ ] HCP  [ ] Guardian: Mainly Vera (daughter) and                 Phone#:    Allergies    adhesives (Rash)  No Known Drug Allergies    Intolerances    MEDICATIONS  (STANDING):  metroNIDAZOLE    Tablet 500 milliGRAM(s) Oral every 8 hours  apixaban 2.5 milliGRAM(s) Oral two times a day  diVALproex  milliGRAM(s) Oral three times a day  sertraline 50 milliGRAM(s) Oral daily  atorvastatin 10 milliGRAM(s) Oral at bedtime  ferrous    sulfate 325 milliGRAM(s) Oral daily  levothyroxine 75 MICROGram(s) Oral daily  insulin lispro (HumaLOG) corrective regimen sliding scale   SubCutaneous three times a day before meals  insulin lispro (HumaLOG) corrective regimen sliding scale   SubCutaneous at bedtime  dextrose 5%. 1000 milliLiter(s) (50 mL/Hr) IV Continuous <Continuous>  dextrose 50% Injectable 12.5 Gram(s) IV Push once  dextrose 50% Injectable 25 Gram(s) IV Push once  dextrose 50% Injectable 25 Gram(s) IV Push once  sodium chloride 0.9%. 1000 milliLiter(s) (50 mL/Hr) IV Continuous <Continuous>    MEDICATIONS  (PRN):  dextrose Gel 1 Dose(s) Oral once PRN Blood Glucose LESS THAN 70 milliGRAM(s)/deciliter  glucagon  Injectable 1 milliGRAM(s) IntraMuscular once PRN Glucose LESS THAN 70 milligrams/deciliter  QUEtiapine 25 milliGRAM(s) Oral every 4 hours PRN For agitation  artificial  tears Solution 1 Drop(s) Both EYES four times a day PRN Dry Eyes  polyethylene glycol 3350 17 Gram(s) Oral daily PRN Constipation      PRESENT SYMPTOMS:  Source: [ ] Patient   [x] Family   [ ] Team     Pain:                        [ ] No [x] Yes             [x] Mild [ ] Moderate [ ] Severe    Dyspnea:                [x] No [ ] Yes             [ ] Mild [ ] Moderate [ ] Severe    Anxiety:                  [x] No [ ] Yes             [ ] Mild [ ] Moderate [ ] Severe    Fatigue:                  [x] No [ ] Yes             [ ] Mild [ ] Moderate [ ] Severe    Nausea:                  [x] No [ ] Yes             [ ] Mild [ ] Moderate [ ] Severe    Loss of appetite:   [x] No [ ] Yes             [ ] Mild [ ] Moderate [ ] Severe    Constipation:        [x] No [ ] Yes             [ ] Mild [ ] Moderate [ ] Severe    Other Symptoms:  [ ] All other review of systems negative   [x] Unable to obtain due to poor mentation     Karnofsky Performance Score/Palliative Performance Status Version 2:  40-50  %    PHYSICAL EXAM:  Vital Signs Last 24 Hrs  T(C): 36.7 (07 Jul 2017 13:27), Max: 37.7 (06 Jul 2017 21:41)  T(F): 98.1 (07 Jul 2017 13:27), Max: 99.9 (06 Jul 2017 21:41)  HR: 76 (07 Jul 2017 13:27) (71 - 77)  BP: 127/60 (07 Jul 2017 13:27) (112/52 - 127/60)  BP(mean): --  RR: 19 (07 Jul 2017 13:27) (19 - 19)  SpO2: 95% (07 Jul 2017 13:27) (93% - 97%) I&O's Summary      General:  [x] Alert  [ ] Oriented x      [ ] Lethargic  [ ] Agitated   [ ] Cachexia   [ ] Unarousable  [x] Verbal  [ ] Non-Verbal    HEENT:  [x] Normal   [ ] Dry mouth   [ ] ET Tube    [ ] Trach  [ ] Oral lesions    Lungs:   [x] Clear [ ] Tachypnea  [ ] Audible excessive secretions   [ ] Rhonchi        [ ] Right [ ] Left [ ] Bilateral  [ ] Crackles        [ ] Right [ ] Left [ ] Bilateral  [ ] Wheezing     [ ] Right [ ] Left [ ] Bilateral    Cardiovascular:  [x] Regular [ ] Irregular [ ] Tachycardia   [ ] Bradycardia  [ ] Murmur [ ] Other    Abdomen: [x] Soft  [ ] Distended   [ ] +BS  [ ] Non tender [ ] Tender  [ ]PEG   [ ]OGT/ NGT   Last BM:       Genitourinary: [ ] Normal [x] Incontinent   [ ] Oliguria/Anuria   [ ] Burrows    Musculoskeletal:  [ ] Normal   [x] Weakness  [ ] Bedbound/Wheelchair bound [ ] Edema    Neurological: [x] No focal deficits  [ ] Cognitive impairment  [ ] Dysphagia [ ] Dysarthria [ ] Paresis [ ] Other     Skin: [x] Normal   [ ] Pressure ulcer(s)                  [ ] Rash    LABS:                        7.7    19.62 )-----------( 835      ( 07 Jul 2017 07:00 )             24.3     07-07    137  |  98  |  10  ----------------------------<  155<H>  3.6   |  26  |  0.47<L>    Ca    8.8      07 Jul 2017 07:00    Oral Intake: [ ] Unable/mouth care only [x] Minimal [ ] Moderate [ ] Full Capability  Diet: [ ] NPO [ ] Tube feeds [ ] TPN [x] Other     RADIOLOGY & ADDITIONAL STUDIES: < from: CT Abdomen and Pelvis w/ IV Cont (07.05.17 @ 20:21) >   ill-defined low density collection in the right posterior   pararenal space has progressed in the interim. Soft tissue density again   identified in the right renal pelvis. Associated adenopathy. High density   material is identified within the right ureter adjacent to the stent and   is uncertain if this represents contrast or stones.    < end of copied text >      REFERRALS:   [ ] Chaplaincy  [ ] Hospice  [ ] Child Life  [ ] Social Work  [ ] Case management [ ] Holistic Therapy

## 2017-07-07 NOTE — PROGRESS NOTE ADULT - PROBLEM SELECTOR PLAN 2
etiology not clear , may be secondary to renal mass / nephrostomy   Ucx and blood cx negative   continue flagyl  -Ct A/p Marked improvement in hydronephrosis on the right.   However, ill-defined low density collection in the right posterior   pararenal space has progressed in the interim.   urology consult requested  monitor CBC

## 2017-07-07 NOTE — PROGRESS NOTE ADULT - SUBJECTIVE AND OBJECTIVE BOX
Subjective  Discussed patient's clinical condition at length with family, explained that NT is due to be changed, and that this may be the cause of the patient's persistent leukocytosis.  Family has an appointment for NT change with Dr. Garcia 7/24, is very insistent that Dr. Garcia be the attending who does the exchange.  Spoke with IR, Dr. Garcia is out of town currently, is available July 12/13/14.  IR has a very packed schedule today, is unsure whether or not they will be able to take a non-urgent case today.  No acute events overnight, patient states she does not have pain.    Vital signs  T(F): , Max: 99.9 (07-06-17 @ 21:41)  HR: 71 (07-07-17 @ 05:17)  BP: 112/52 (07-07-17 @ 05:17)  SpO2: 93% (07-07-17 @ 05:17)    Gen NAD  Abd soft, NT, ND   R NT in place, capped    Labs    07-07 @ 07:00    WBC 19.62 / Hct 24.3  / SCr 0.47

## 2017-07-07 NOTE — PROGRESS NOTE ADULT - ASSESSMENT
81 year old female with dementia with right sided hydronephrosis with a possible right renal pelvis mass.   Now with diarrhea and leukocytosis.    The patient's diarrhea has resolved. I would not consider this the cause of her leukocytosis as her stool has been formed for days.  Can finish 10 day total course of flagyl    I f loose stool recurrs, check c diff, stool culture, giardia.     CT with renal pelvis mass is still concerning for cause of leukocytosis.  Consider biopsy/ drainage for path and culture.     Please call the ID service [phone] with questions or concerns over the weekend 81 year old female with dementia with right sided hydronephrosis with a possible right renal pelvis mass.   Now with diarrhea and leukocytosis.    The patient's diarrhea has resolved. I would not consider this the cause of her leukocytosis as her stool has been formed for days.  Can finish 10 day total course of flagyl    I f loose stool recurrs, check c diff, stool culture, giardia.     CT with renal pelvis mass is still concerning for cause of leukocytosis.  Consider biopsy/ drainage for path and culture if amenable with GOC    Consider palliative eval.  Family is struggling with how aggressive to be in her care and expressed interest in palliative eval.       Please call the ID service [phone] with questions or concerns over the weekend

## 2017-07-07 NOTE — CONSULT NOTE ADULT - ATTENDING COMMENTS
Advanced care planning with family at bedside.  Code status discussed, but want to remain full code for now and discuss with other siblings prior to making this decision.  Multiple concerns addressed.  Hospice support discussed.

## 2017-07-07 NOTE — DISCHARGE NOTE ADULT - MEDICATION SUMMARY - MEDICATIONS TO STOP TAKING
I will STOP taking the medications listed below when I get home from the hospital:    levoFLOXacin 500 mg oral tablet  -- 1 tab(s) by mouth every 24 hours for 5 days    Eliquis 2.5 mg oral tablet  -- 1 tab(s) by mouth 2 times a day

## 2017-07-07 NOTE — CONSULT NOTE ADULT - PROBLEM SELECTOR RECOMMENDATION 4
Debility and frailty due to underlying dementia with likely malignancy from renal mass - no dx made.

## 2017-07-07 NOTE — DISCHARGE NOTE ADULT - PATIENT PORTAL LINK FT
“You can access the [facility] Patient Portal, offered by [facility], by registering with the following website: [email or web address]”

## 2017-07-07 NOTE — CONSULT NOTE ADULT - ASSESSMENT
81 year old female with dementia with right sided hydronephrosis with a possible right renal pelvis mass.   Now with diarrhea and leukocytosis.    If the patient has loose stool, check c diff. Otherwise finish 10 days of empiric flagyl.    etiology of leukocytosis is not clear.   In light of prior notes right renal pelvis mass, I would repeat a CT of the a/p.    I f loose stool recurrs, check c diff, stool culture, giardia.
81 year old Female with Medical History of HTN, DM, advanced dementia, DVT on Eliquis, hypothyroidism, hydronephrosis 2/2 R renal pelvis mass s/p nephrostomy tube p/w diarrhea.  Admitted for diarrhea and leukocytosis.
81yoF with known R renal pelvic mass and R perinephric collection

## 2017-07-07 NOTE — DISCHARGE NOTE ADULT - CARE PLAN
Principal Discharge DX:	Diarrhea  Secondary Diagnosis:	Dry eyes, bilateral  Instructions for follow-up, activity and diet:	Please continue lid hygiene and drop applications as needed. Follow up with Opthalmology [phone] Principal Discharge DX:	Diarrhea  Secondary Diagnosis:	Dry eyes, bilateral  Instructions for follow-up, activity and diet:	Please continue lid hygiene and drop applications as needed. Follow up with Opthalmology [phone] Principal Discharge DX:	Leucocytosis  Goal:	Comfort care  Instructions for follow-up, activity and diet:	Possibly due to underlying malignancy.    Continue with care as per hospice as no further work-up requested.  Secondary Diagnosis:	Hematuria  Instructions for follow-up, activity and diet:	Resolved  Secondary Diagnosis:	Diarrhea  Instructions for follow-up, activity and diet:	Received flagyl x 10 days.    Resolved.  Secondary Diagnosis:	Hypothyroid  Instructions for follow-up, activity and diet:	Continue synthroid.  Secondary Diagnosis:	Diabetes  Instructions for follow-up, activity and diet:	Continue home medications.  Secondary Diagnosis:	Deep vein thrombosis (DVT) of lower extremity, unspecified chronicity, unspecified laterality, unspecified vein  Instructions for follow-up, activity and diet:	Repeat doppler negative.  No need for anticoagulation at this time.  Secondary Diagnosis:	Late onset Alzheimer's disease with behavioral disturbance  Instructions for follow-up, activity and diet:	Continue with Seroquel. Principal Discharge DX:	Leucocytosis  Goal:	Comfort care  Instructions for follow-up, activity and diet:	Presents with persistent elevated WBC count. May be secondarily to renal mass/ malignancy. No biopsy or surgical intervention needed. Continue with comfort care as per hospice, full code as per requested by family.  Secondary Diagnosis:	Hematuria  Instructions for follow-up, activity and diet:	May be secondarily to trauma from procedure. Resolved. Monitor for urine output and signs of blood in urine. Follow up with PCP in 1-2 weeks regarding management.  Secondary Diagnosis:	Diarrhea  Instructions for follow-up, activity and diet:	Treated prior to hospitalization with antibiotics for urinary tract infection. Treated with Flagyl x 10 days. Follow up with PCP in 1-2 weeks regarding management.  Secondary Diagnosis:	Hypothyroid  Instructions for follow-up, activity and diet:	Continue synthroid.  Secondary Diagnosis:	Diabetes  Instructions for follow-up, activity and diet:	Continue home medications.  Secondary Diagnosis:	Deep vein thrombosis (DVT) of lower extremity, unspecified chronicity, unspecified laterality, unspecified vein  Instructions for follow-up, activity and diet:	Repeat doppler negative.  No need for anticoagulation at this time.  Secondary Diagnosis:	Late onset Alzheimer's disease with behavioral disturbance  Instructions for follow-up, activity and diet:	Continue with Seroquel. Principal Discharge DX:	Leucocytosis  Goal:	Comfort care  Instructions for follow-up, activity and diet:	Presents with persistent elevated WBC count. May be secondarily to renal mass/ malignancy. No biopsy or surgical intervention needed. Continue with comfort care as per hospice, full code as per requested by family.  Secondary Diagnosis:	Hematuria  Instructions for follow-up, activity and diet:	May be secondarily to trauma from procedure. Resolved. Monitor for urine output and signs of blood in urine. Follow up with PCP in 1-2 weeks regarding management.  Secondary Diagnosis:	Diarrhea  Instructions for follow-up, activity and diet:	Treated prior to hospitalization with antibiotics for urinary tract infection. Treated with Flagyl x 10 days. Resolved. Follow up with PCP in 1-2 weeks regarding management.  Secondary Diagnosis:	Hypothyroid  Instructions for follow-up, activity and diet:	Continue Synthroid.  Secondary Diagnosis:	Diabetes  Instructions for follow-up, activity and diet:	HgbA1c 7.6%. Continue with insulin on a sliding scale. Follow up with PCP in 1-2 weeks regarding management.  Secondary Diagnosis:	Deep vein thrombosis (DVT) of lower extremity, unspecified chronicity, unspecified laterality, unspecified vein  Instructions for follow-up, activity and diet:	Repeat Doppler negative.  No need for anticoagulation at this time.  Secondary Diagnosis:	Late onset Alzheimer's disease with behavioral disturbance  Instructions for follow-up, activity and diet:	Continue with home medications.

## 2017-07-07 NOTE — DISCHARGE NOTE ADULT - HOSPITAL COURSE
80 y/o F with HTN, DM, advanced dementia, DVT on Eliquis, hypothyroidism, hydronephrosis 2/2 R renal pelvis mass s/p nephrostomy tube p/w diarrhea.  Pt's daughter reports that about 2 weeks ago, pt was treated for UTI with Levaquin and Diflucan.  4 Days prior to admission she developed green, watery diarrhea. Pt's daughter gave her Imodium with resolution of diarrhea yesterday.  Diarrhea again returned on date of admission, and daughter gave Imodium this AM.  Pt had low grade temp.      Diarrhea   - No signs of toxic megacolon.  -abdominal xray Gaseous distention of the colon particularly the transverse colon but no obstruction  -Completed 10 day Flagyl course  -BCx NTD    Hypothyroidism, unspecified type:  - continue synthroid.       Type 2 diabetes mellitus without complication, without long-term current use of insulin:  -humalog SS as inpatient  -continue home regimen as o/p.      Late onset Alzheimer's disease with behavioral disturbance:  - continue depakote and Zoloft.       Deep vein thrombosis (DVT) of lower extremity, unspecified chronicity, unspecified laterality, unspecified vein:  -RLE doppler: No evidence of deep venous thrombosis  -no need for A/C at this time    Dysphagia   -Pureed with thin liquids with feeding assistance   - patient coughting/chocking on thin liquids, speech and swallow ordered, diet changed to nectar thick at this time, suction prn,   -chest x-ray : Clear lungs.  -repeat S&S 7/26: continue with comfort feeds      Renal mass  -Not a surgical candidate for renal mass   -nephrostomy tube exchange 7/17    Elevated WBC  most likely related to underlying malignancy    Medically cleared for d/c home with hospice. 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  Pt's daughter reports that about 2 weeks ago, pt was treated for UTI with Levaquin and Diflucan.  4 Days prior to admission she developed green, watery diarrhea. Pt's daughter gave her Imodium with resolution of diarrhea yesterday.  Diarrhea again returned on date of admission, and daughter gave Imodium this AM.  Pt had low grade temp.      Diarrhea   - No signs of toxic megacolon.  -abdominal xray Gaseous distention of the colon particularly the transverse colon but no obstruction  -Completed 10 day Flagyl course  -BCx NTD    Hypothyroidism, unspecified type:  - continue synthroid.       Type 2 diabetes mellitus without complication, without long-term current use of insulin:  -humalog SS as inpatient  -continue home regimen as o/p.      Late onset Alzheimer's disease with behavioral disturbance:  - continue depakote and Zoloft.       Deep vein thrombosis (DVT) of lower extremity, unspecified chronicity, unspecified laterality, unspecified vein:  -RLE doppler: No evidence of deep venous thrombosis  -no need for A/C at this time with the poor overall prognosis and the goal of the comfort care    Dysphagia   -Pureed with thin liquids with feeding assistance   - patient coughting/chocking on thin liquids, speech and swallow ordered, diet changed to nectar thick at this time, suction prn,   -chest x-ray : Clear lungs.  -repeat S&S 7/26: continue with comfort feeds      Renal mass  -Not a surgical candidate for renal mass   -nephrostomy tube exchange 7/17    Elevated WBC  most likely related to underlying malignancy    Fever likely due to UTI  d/c with oral abx for a short course     Extensive discussions with patient's daughters and sons. They are all aware that overall prognosis is very poor. They don't want to pursue further diagnosis. They want to bring patient home with home hospice.     Medically cleared for d/c home with hospice. 80 y/o F with HTN, DM, advanced dementia, DVT on Eliquis, hypothyroidism, hydronephrosis 2/2 R renal pelvis mass s/p nephrostomy tube p/w diarrhea.  Pt's daughter reports that about 2 weeks ago, pt was treated for UTI with Levaquin and Diflucan.  4 Days prior to admission she developed green, watery diarrhea. Pt's daughter gave her Imodium with resolution of diarrhea yesterday.  Diarrhea again returned on date of admission, and daughter gave Imodium this AM.  Pt had low grade temp.      Diarrhea   - No signs of toxic megacolon.  -abdominal xray Gaseous distention of the colon particularly the transverse colon but no obstruction  -Completed 10 day Flagyl course  -BCx NTD    Hypothyroidism, unspecified type:  - continue synthroid.       Type 2 diabetes mellitus without complication, without long-term current use of insulin:  -humalog SS as inpatient  -continue home regimen as o/p.      Late onset Alzheimer's disease with behavioral disturbance:  - continue depakote and Zoloft.       Deep vein thrombosis (DVT) of lower extremity, unspecified chronicity, unspecified laterality, unspecified vein:  -RLE doppler: No evidence of deep venous thrombosis  -no need for A/C at this time with the poor overall prognosis and the goal of the comfort care    Dysphagia   -Pureed with thin liquids with feeding assistance   - patient coughting/chocking on thin liquids, speech and swallow ordered, diet changed to nectar thick at this time, suction prn,   -chest x-ray : Clear lungs.  -repeat S&S 7/26: continue with comfort feeds      Renal mass  -Not a surgical candidate for renal mass   -nephrostomy tube exchange 7/17    Elevated WBC  most likely related to underlying malignancy    Fever likely due to UTI  d/c with oral abx for a short course. Fever resolved prior to discharge     Extensive discussions with patient's daughters and sons. They are all aware that overall prognosis is very poor. They don't want to pursue further diagnosis. They want to bring patient home with home hospice.     Medically cleared for d/c home with hospice.

## 2017-07-07 NOTE — PROGRESS NOTE ADULT - SUBJECTIVE AND OBJECTIVE BOX
Patient is a 81y old  Female who presents with a chief complaint of Diarrhea (07 Jul 2017 10:50)      SUBJECTIVE / OVERNIGHT EVENTS: patient seen and examined by bedside, no acute events over night, no acute distress noted, no BM still      MEDICATIONS  (STANDING):  metroNIDAZOLE    Tablet 500 milliGRAM(s) Oral every 8 hours  apixaban 2.5 milliGRAM(s) Oral two times a day  diVALproex  milliGRAM(s) Oral three times a day  sertraline 50 milliGRAM(s) Oral daily  atorvastatin 10 milliGRAM(s) Oral at bedtime  ferrous    sulfate 325 milliGRAM(s) Oral daily  levothyroxine 75 MICROGram(s) Oral daily  insulin lispro (HumaLOG) corrective regimen sliding scale   SubCutaneous three times a day before meals  insulin lispro (HumaLOG) corrective regimen sliding scale   SubCutaneous at bedtime  dextrose 5%. 1000 milliLiter(s) (50 mL/Hr) IV Continuous <Continuous>  dextrose 50% Injectable 12.5 Gram(s) IV Push once  dextrose 50% Injectable 25 Gram(s) IV Push once  dextrose 50% Injectable 25 Gram(s) IV Push once  sodium chloride 0.9%. 1000 milliLiter(s) (50 mL/Hr) IV Continuous <Continuous>    MEDICATIONS  (PRN):  dextrose Gel 1 Dose(s) Oral once PRN Blood Glucose LESS THAN 70 milliGRAM(s)/deciliter  glucagon  Injectable 1 milliGRAM(s) IntraMuscular once PRN Glucose LESS THAN 70 milligrams/deciliter  QUEtiapine 25 milliGRAM(s) Oral every 4 hours PRN For agitation  artificial  tears Solution 1 Drop(s) Both EYES four times a day PRN Dry Eyes  polyethylene glycol 3350 17 Gram(s) Oral daily PRN Constipation  acetaminophen    Suspension. 650 milliGRAM(s) Oral every 6 hours PRN Mild Pain (1 - 3) and moderate pain  bisacodyl Suppository 10 milliGRAM(s) Rectal daily PRN Constipation      Vital Signs Last 24 Hrs  T(C): 36.7 (07-07-17 @ 13:27), Max: 37.7 (07-06-17 @ 21:41)  HR: 76 (07-07-17 @ 13:27) (71 - 77)  BP: 127/60 (07-07-17 @ 13:27) (112/52 - 127/60)  RR: 19 (07-07-17 @ 13:27) (19 - 19)  SpO2: 95% (07-07-17 @ 13:27) (93% - 97%)  CAPILLARY BLOOD GLUCOSE  219 (07 Jul 2017 16:30)  135 (07 Jul 2017 11:39)  185 (07 Jul 2017 08:20)  160 (06 Jul 2017 22:30)      PHYSICAL EXAM:  GENERAL: NAD,   HEAD:  Atraumatic, Normocephalic  EYES: EOMI, PERRLA, conjunctiva and sclera clear  NECK: Supple, No JVD  CHEST/LUNG: Clear to auscultation bilaterally; No wheeze  HEART: Regular rate and rhythm; No murmurs, rubs, or gallops  ABDOMEN: Soft, Nontender, Nondistended; Bowel sounds present  EXTREMITIES:  Pulses, No clubbing, cyanosis, or edema  PSYCH: minimally  verbal   NEUROLOGY: functional quadriplegia   SKIN: No rashes or lesions        LABS:                        7.7    19.62 )-----------( 835      ( 07 Jul 2017 07:00 )             24.3     07-07    137  |  98  |  10  ----------------------------<  155<H>  3.6   |  26  |  0.47<L>    Ca    8.8      07 Jul 2017 07:00                RADIOLOGY & ADDITIONAL TESTS:    Imaging Personally Reviewed:    Consultant(s) Notes Reviewed:  ID,     Care Discussed with Consultants/Other Providers:

## 2017-07-07 NOTE — DISCHARGE NOTE ADULT - INSTRUCTIONS
Dysphagia 1 with Pureed Blackey.    Make sure patient is in sitting position prior to feedings. Dysphagia 1 with Pureed Nectar. Glucerna 3 times a day   Make sure patient is in sitting position prior to feedings.

## 2017-07-07 NOTE — DISCHARGE NOTE ADULT - MEDICATION SUMMARY - MEDICATIONS TO TAKE
I will START or STAY ON the medications listed below when I get home from the hospital:    disposable chucks  -- Use as directed  dx: R32  -- Indication: For incontinence    acetaminophen 160 mg/5 mL oral suspension  -- 20.31 milliliter(s) by mouth every 6 hours, As needed, Mild Pain (1 - 3)  -- Indication: For Pain    acetaminophen 325 mg oral tablet  -- 2 tab(s) by mouth every 6 hours, As needed, Moderate Pain (4 - 6)  -- Indication: For Pain    doxazosin 1 mg oral tablet  -- 1 tab(s) by mouth once a day (at bedtime)  -- Indication: For Renal mass, right    divalproex sodium 250 mg oral delayed release tablet  -- 1 tab(s) by mouth 3 times a day  -- Indication: For Late onset Alzheimer's disease with behavioral disturbance    sertraline 50 mg oral tablet  -- 1 tab(s) by mouth once a day  -- Indication: For Depression    glipiZIDE 5 mg oral tablet, extended release  -- 1 tab(s) by mouth once a day  -- Indication: For DM    Riomet 500 mg/5 mL oral solution  -- 10 milliliter(s) by mouth 2 times a day  -- Indication: For DM    Lipitor 10 mg oral tablet  -- 1 tab(s) by mouth once a day  -- Indication: For CAD    QUEtiapine 25 mg oral tablet  -- 1 tab(s) by mouth every 4 hours, As needed, For agitation  -- Indication: For Anxiety    Ceftin 250 mg oral tablet  -- 1 tab(s) by mouth every 12 hours  -- Indication: For UTI    ferrous sulfate 325 mg (65 mg elemental iron) oral tablet  -- 1 tab(s) by mouth 2 times a day  -- Indication: For Anemia    bisacodyl 10 mg rectal suppository  -- 1 suppository(ies) rectally once a day, As needed, Constipation  -- Indication: For constipation    polyethylene glycol 3350 oral powder for reconstitution  -- 17 gram(s) by mouth once a day, As needed, Constipation  -- Indication: For constipation    ocular lubricant ophthalmic solution  -- 1 drop(s) to each affected eye 4 times a day, As needed, Dry Eyes  -- Indication: For Prophylactic    levothyroxine 75 mcg (0.075 mg) oral tablet  -- 1 tab(s) by mouth once a day  -- Indication: For Hypothyroid I will START or STAY ON the medications listed below when I get home from the hospital:    disposable chucks  -- Use as directed  dx: R32  -- Indication: For incontinence    acetaminophen 160 mg/5 mL oral suspension  -- 20.31 milliliter(s) by mouth every 6 hours, As needed, Mild Pain (1 - 3)  -- Indication: For Pain    acetaminophen 325 mg oral tablet  -- 2 tab(s) by mouth every 6 hours, As needed, Moderate Pain (4 - 6)  -- Indication: For Pain    doxazosin 1 mg oral tablet  -- 1 tab(s) by mouth once a day (at bedtime)  -- Indication: For Prophylactic    divalproex sodium 250 mg oral delayed release tablet  -- 1 tab(s) by mouth 3 times a day  -- Indication: For Late onset Alzheimer's disease with behavioral disturbance    sertraline 50 mg oral tablet  -- 1 tab(s) by mouth once a day  -- Indication: For Depression    glipiZIDE 5 mg oral tablet, extended release  -- 1 tab(s) by mouth once a day  -- Indication: For DM    Riomet 500 mg/5 mL oral solution  -- 10 milliliter(s) by mouth 2 times a day  -- Indication: For DM    Lipitor 10 mg oral tablet  -- 1 tab(s) by mouth once a day  -- Indication: For CAD    QUEtiapine 25 mg oral tablet  -- 1 tab(s) by mouth every 4 hours, As needed, For agitation  -- Indication: For Anxiety    Ceftin 250 mg oral tablet  -- 1 tab(s) by mouth every 12 hours  -- Indication: For UTI    ferrous sulfate 325 mg (65 mg elemental iron) oral tablet  -- 1 tab(s) by mouth 2 times a day  -- Indication: For Anemia    bisacodyl 10 mg rectal suppository  -- 1 suppository(ies) rectally once a day, As needed, Constipation  -- Indication: For constipation    polyethylene glycol 3350 oral powder for reconstitution  -- 17 gram(s) by mouth once a day, As needed, Constipation  -- Indication: For constipation    ocular lubricant ophthalmic solution  -- 1 drop(s) to each affected eye 4 times a day, As needed, Dry Eyes  -- Indication: For Prophylactic    levothyroxine 75 mcg (0.075 mg) oral tablet  -- 1 tab(s) by mouth once a day  -- Indication: For Hypothyroid I will START or STAY ON the medications listed below when I get home from the hospital:    disposable chucks  -- Use as directed  dx: R32  -- Indication: For incontinence    acetaminophen 160 mg/5 mL oral suspension  -- 20.31 milliliter(s) by mouth every 6 hours, As needed, Mild Pain (1 - 3)  -- Indication: For Pain    acetaminophen 325 mg oral tablet  -- 2 tab(s) by mouth every 6 hours, As needed, Moderate Pain (4 - 6)  -- Indication: For Pain    fentaNYL 12 mcg/hr transdermal film, extended release  -- 1 patch by transdermal patch every 72 hours MDD:change every 3 days  -- Indication: For Pain     doxazosin 1 mg oral tablet  -- 1 tab(s) by mouth once a day (at bedtime)  -- Indication: For Prophylactic    divalproex sodium 250 mg oral delayed release tablet  -- 1 tab(s) by mouth 3 times a day  -- Indication: For Late onset Alzheimer's disease with behavioral disturbance    sertraline 50 mg oral tablet  -- 1 tab(s) by mouth once a day  -- Indication: For Depression    glipiZIDE 5 mg oral tablet, extended release  -- 1 tab(s) by mouth once a day  -- Indication: For DM    Riomet 500 mg/5 mL oral solution  -- 10 milliliter(s) by mouth 2 times a day  -- Indication: For DM    Lipitor 10 mg oral tablet  -- 1 tab(s) by mouth once a day  -- Indication: For CAD    QUEtiapine 25 mg oral tablet  -- 1 tab(s) by mouth every 4 hours, As needed, For agitation  -- Indication: For Anxiety    Ceftin 250 mg oral tablet  -- 1 tab(s) by mouth every 12 hours  -- Indication: For UTI    ferrous sulfate 325 mg (65 mg elemental iron) oral tablet  -- 1 tab(s) by mouth 2 times a day  -- Indication: For Anemia    bisacodyl 10 mg rectal suppository  -- 1 suppository(ies) rectally once a day, As needed, Constipation  -- Indication: For constipation    polyethylene glycol 3350 oral powder for reconstitution  -- 17 gram(s) by mouth once a day, As needed, Constipation  -- Indication: For constipation    ocular lubricant ophthalmic solution  -- 1 drop(s) to each affected eye 4 times a day, As needed, Dry Eyes  -- Indication: For Prophylactic    levothyroxine 75 mcg (0.075 mg) oral tablet  -- 1 tab(s) by mouth once a day  -- Indication: For Hypothyroid

## 2017-07-07 NOTE — CONSULT NOTE ADULT - PROBLEM SELECTOR RECOMMENDATION 3
Diffuse abdominal pain   Would recommend placing Tylenol standing  Per family patient has not had a BM, would implement as needed dulcolax to prevent constipation

## 2017-07-07 NOTE — PROGRESS NOTE ADULT - PROBLEM SELECTOR PLAN 1
High suspicion for C diff given recent antibiotic use and leukocytosis.  Decreased frequency of diarrhea possibly 2/2 imodium use. No signs of toxic megacolon.  - will continue empiric Flagyl   - C diff PCR once pt passes stool  -pt still with no bm,  abdominal xray unremarkable   -ID consult requested for persistent leucocytosis  - recommend  if loose stool recurrs, check c diff, stool culture, giardia, Otherwise finish 10 days of empiric flagyl.  -Ct A/p noted , Marked improvement in hydronephrosis on the right.   However, ill-defined low density collection in the right posterior   pararenal space has progressed in the interim.   urology consult  noted, recommend Nephrostomy tube changed by IR, IR unable to do it today, will schedule for Monday, NPO after midnight on Sunday  family requesting for Miralax for constipation , will give one dose today

## 2017-07-07 NOTE — PROGRESS NOTE ADULT - PROBLEM SELECTOR PLAN 1
-Recommend IR exchange of R NT, possible cause of patient's leukocytosis  -Family has appt with Dr. Garcia 7/24 for exchange, prefers to keep this appointment so that Dr. Garcia can perform the procedure.  -Patient has multiple medical comorbidities, and is not a good surgical candidate   -No acute  intervention  -Follow up with Dr. Kurtz as an outpatient as needed -Recommend IR exchange of R NT, possible cause of patient's leukocytosis  -Family has appt with Dr. Garcia 7/24 for exchange, prefers to keep this appointment so that Dr. Garcia can perform the procedure. Discussed that this could be contributing to patients clinical condition but family very hesitant about any and all interventions.  -Patient has multiple medical comorbidities, and is not a good surgical candidate   -No acute  intervention  -Follow up with Dr. Kurtz as an outpatient as needed

## 2017-07-07 NOTE — PROGRESS NOTE ADULT - PROBLEM SELECTOR PLAN 7
Family does not want any surgical intervention, will request Palliative care eval for GOC discussion

## 2017-07-07 NOTE — PROGRESS NOTE ADULT - SUBJECTIVE AND OBJECTIVE BOX
Follow Up:      Inverval History/ROS:Patient is a 81y old  Female who presents with a chief complaint of Diarrhea (03 Jul 2017 00:19)      Allergies    adhesives (Rash)  No Known Drug Allergies    Intolerances        ANTIMICROBIALS:  metroNIDAZOLE    Tablet 500 every 8 hours      OTHER MEDS:  apixaban 2.5 milliGRAM(s) Oral two times a day  diVALproex  milliGRAM(s) Oral three times a day  sertraline 50 milliGRAM(s) Oral daily  atorvastatin 10 milliGRAM(s) Oral at bedtime  ferrous    sulfate 325 milliGRAM(s) Oral daily  levothyroxine 75 MICROGram(s) Oral daily  insulin lispro (HumaLOG) corrective regimen sliding scale   SubCutaneous three times a day before meals  insulin lispro (HumaLOG) corrective regimen sliding scale   SubCutaneous at bedtime  dextrose 5%. 1000 milliLiter(s) IV Continuous <Continuous>  dextrose Gel 1 Dose(s) Oral once PRN  dextrose 50% Injectable 12.5 Gram(s) IV Push once  dextrose 50% Injectable 25 Gram(s) IV Push once  dextrose 50% Injectable 25 Gram(s) IV Push once  glucagon  Injectable 1 milliGRAM(s) IntraMuscular once PRN  sodium chloride 0.9%. 1000 milliLiter(s) IV Continuous <Continuous>  QUEtiapine 25 milliGRAM(s) Oral every 4 hours PRN  artificial  tears Solution 1 Drop(s) Both EYES four times a day PRN      Vital Signs Last 24 Hrs  T(C): 37.3 (07 Jul 2017 05:17), Max: 37.7 (06 Jul 2017 21:41)  T(F): 99.1 (07 Jul 2017 05:17), Max: 99.9 (06 Jul 2017 21:41)  HR: 71 (07 Jul 2017 05:17) (71 - 77)  BP: 112/52 (07 Jul 2017 05:17) (112/52 - 119/60)  BP(mean): --  RR: 19 (07 Jul 2017 05:17) (18 - 19)  SpO2: 93% (07 Jul 2017 05:17) (93% - 97%)    PHYSICAL EXAM:  General: [x ] non-toxic  HEAD/EYES: [ ] PERRL [x ] white sclera [ ] icterus  ENT:  [ ] normal [ x] supple [ ] thrush [ ] pharyngeal exudate  Cardiovascular:   [ ] murmur [x ] normal [ ] PPM/AICD  Respiratory:  [x ] clear to ausculation bilaterally  GI:  [x ] soft, non-tender, normal bowel sounds  :  [ ] sanchez [ ] no CVA tenderness   Musculoskeletal:  [ ] no synovitis  Neurologic:  [x ] non-focal exam   Skin:  [ ] no rash  Lymph: [ ] no lymphadenopathy  Psychiatric:  [x ] appropriate affect [ ] alert & oriented  Lines:  [x ] no phlebitis [ ] central line                          MICROBIOLOGY:    RADIOLOGY:

## 2017-07-07 NOTE — CONSULT NOTE ADULT - PROBLEM SELECTOR RECOMMENDATION 2
No resection of mass offered due to patients baseline  Only nephrostomy in place  Needs replacement - scheduled for Monday; family agrees with going forward with replacement of nephrostomy.

## 2017-07-07 NOTE — DISCHARGE NOTE ADULT - SECONDARY DIAGNOSIS.
Dry eyes, bilateral Diarrhea Hypothyroid Diabetes Deep vein thrombosis (DVT) of lower extremity, unspecified chronicity, unspecified laterality, unspecified vein Late onset Alzheimer's disease with behavioral disturbance Hematuria

## 2017-07-08 LAB
BUN SERPL-MCNC: 11 MG/DL — SIGNIFICANT CHANGE UP (ref 7–23)
CALCIUM SERPL-MCNC: 8.8 MG/DL — SIGNIFICANT CHANGE UP (ref 8.4–10.5)
CHLORIDE SERPL-SCNC: 99 MMOL/L — SIGNIFICANT CHANGE UP (ref 98–107)
CO2 SERPL-SCNC: 23 MMOL/L — SIGNIFICANT CHANGE UP (ref 22–31)
CREAT SERPL-MCNC: 0.44 MG/DL — LOW (ref 0.5–1.3)
GLUCOSE SERPL-MCNC: 146 MG/DL — HIGH (ref 70–99)
HCT VFR BLD CALC: 24.3 % — LOW (ref 34.5–45)
HGB BLD-MCNC: 7.9 G/DL — LOW (ref 11.5–15.5)
MCHC RBC-ENTMCNC: 27.9 PG — SIGNIFICANT CHANGE UP (ref 27–34)
MCHC RBC-ENTMCNC: 32.5 % — SIGNIFICANT CHANGE UP (ref 32–36)
MCV RBC AUTO: 85.9 FL — SIGNIFICANT CHANGE UP (ref 80–100)
NRBC # FLD: 0 — SIGNIFICANT CHANGE UP
PLATELET # BLD AUTO: 849 K/UL — HIGH (ref 150–400)
PMV BLD: 10.7 FL — SIGNIFICANT CHANGE UP (ref 7–13)
POTASSIUM SERPL-MCNC: 3.6 MMOL/L — SIGNIFICANT CHANGE UP (ref 3.5–5.3)
POTASSIUM SERPL-SCNC: 3.6 MMOL/L — SIGNIFICANT CHANGE UP (ref 3.5–5.3)
RBC # BLD: 2.83 M/UL — LOW (ref 3.8–5.2)
RBC # FLD: 16.5 % — HIGH (ref 10.3–14.5)
SODIUM SERPL-SCNC: 136 MMOL/L — SIGNIFICANT CHANGE UP (ref 135–145)
WBC # BLD: 20.18 K/UL — HIGH (ref 3.8–10.5)
WBC # FLD AUTO: 20.18 K/UL — HIGH (ref 3.8–10.5)

## 2017-07-08 PROCEDURE — 99233 SBSQ HOSP IP/OBS HIGH 50: CPT

## 2017-07-08 RX ORDER — VALPROIC ACID (AS SODIUM SALT) 250 MG/5ML
250 SOLUTION, ORAL ORAL THREE TIMES A DAY
Qty: 0 | Refills: 0 | Status: DISCONTINUED | OUTPATIENT
Start: 2017-07-08 | End: 2017-08-05

## 2017-07-08 RX ADMIN — SODIUM CHLORIDE 50 MILLILITER(S): 9 INJECTION INTRAMUSCULAR; INTRAVENOUS; SUBCUTANEOUS at 05:59

## 2017-07-08 RX ADMIN — Medication 325 MILLIGRAM(S): at 12:04

## 2017-07-08 RX ADMIN — Medication 2: at 17:25

## 2017-07-08 RX ADMIN — SERTRALINE 50 MILLIGRAM(S): 25 TABLET, FILM COATED ORAL at 12:04

## 2017-07-08 RX ADMIN — Medication 250 MILLIGRAM(S): at 14:57

## 2017-07-08 RX ADMIN — Medication 75 MICROGRAM(S): at 05:54

## 2017-07-08 RX ADMIN — APIXABAN 2.5 MILLIGRAM(S): 2.5 TABLET, FILM COATED ORAL at 17:26

## 2017-07-08 RX ADMIN — POLYETHYLENE GLYCOL 3350 17 GRAM(S): 17 POWDER, FOR SOLUTION ORAL at 12:03

## 2017-07-08 RX ADMIN — ATORVASTATIN CALCIUM 10 MILLIGRAM(S): 80 TABLET, FILM COATED ORAL at 22:06

## 2017-07-08 RX ADMIN — APIXABAN 2.5 MILLIGRAM(S): 2.5 TABLET, FILM COATED ORAL at 05:54

## 2017-07-08 RX ADMIN — Medication 1: at 09:19

## 2017-07-08 RX ADMIN — QUETIAPINE FUMARATE 25 MILLIGRAM(S): 200 TABLET, FILM COATED ORAL at 09:19

## 2017-07-08 RX ADMIN — Medication 500 MILLIGRAM(S): at 05:54

## 2017-07-08 RX ADMIN — QUETIAPINE FUMARATE 25 MILLIGRAM(S): 200 TABLET, FILM COATED ORAL at 17:26

## 2017-07-08 RX ADMIN — Medication 500 MILLIGRAM(S): at 14:57

## 2017-07-08 RX ADMIN — Medication 250 MILLIGRAM(S): at 22:06

## 2017-07-08 RX ADMIN — Medication 500 MILLIGRAM(S): at 22:06

## 2017-07-08 RX ADMIN — Medication 1: at 12:03

## 2017-07-08 NOTE — PROGRESS NOTE ADULT - PROBLEM SELECTOR PLAN 2
etiology not clear , may be secondary to renal mass / nephrostomy   Ucx and blood cx negative   continue flagyl  -Ct A/p Marked improvement in hydronephrosis on the right.   However, ill-defined low density collection in the right posterior   pararenal space has progressed in the interim.   urology f/u noted ,Plan for tube change by IR  for Monday,   monitor CBC

## 2017-07-08 NOTE — PROGRESS NOTE ADULT - SUBJECTIVE AND OBJECTIVE BOX
Patient is a 81y old  Female who presents with a chief complaint of Diarrhea (07 Jul 2017 10:50)      SUBJECTIVE / OVERNIGHT EVENTS: patient seen and examined by bedside, no acute events over night, pt still did not move bowels         MEDICATIONS  (STANDING):  metroNIDAZOLE    Tablet 500 milliGRAM(s) Oral every 8 hours  apixaban 2.5 milliGRAM(s) Oral two times a day  sertraline 50 milliGRAM(s) Oral daily  atorvastatin 10 milliGRAM(s) Oral at bedtime  ferrous    sulfate 325 milliGRAM(s) Oral daily  levothyroxine 75 MICROGram(s) Oral daily  insulin lispro (HumaLOG) corrective regimen sliding scale   SubCutaneous three times a day before meals  insulin lispro (HumaLOG) corrective regimen sliding scale   SubCutaneous at bedtime  dextrose 5%. 1000 milliLiter(s) (50 mL/Hr) IV Continuous <Continuous>  dextrose 50% Injectable 12.5 Gram(s) IV Push once  dextrose 50% Injectable 25 Gram(s) IV Push once  dextrose 50% Injectable 25 Gram(s) IV Push once  sodium chloride 0.9%. 1000 milliLiter(s) (50 mL/Hr) IV Continuous <Continuous>  valproic  acid Syrup 250 milliGRAM(s) Oral three times a day    MEDICATIONS  (PRN):  dextrose Gel 1 Dose(s) Oral once PRN Blood Glucose LESS THAN 70 milliGRAM(s)/deciliter  glucagon  Injectable 1 milliGRAM(s) IntraMuscular once PRN Glucose LESS THAN 70 milligrams/deciliter  QUEtiapine 25 milliGRAM(s) Oral every 4 hours PRN For agitation  artificial  tears Solution 1 Drop(s) Both EYES four times a day PRN Dry Eyes  polyethylene glycol 3350 17 Gram(s) Oral daily PRN Constipation  acetaminophen    Suspension. 650 milliGRAM(s) Oral every 6 hours PRN Mild Pain (1 - 3) and moderate pain  bisacodyl Suppository 10 milliGRAM(s) Rectal daily PRN Constipation      Vital Signs Last 24 Hrs  T(C): 36.9 (07-08-17 @ 12:51), Max: 36.9 (07-08-17 @ 12:51)  HR: 76 (07-08-17 @ 12:51) (64 - 76)  BP: 107/67 (07-08-17 @ 12:51) (102/61 - 128/63)  RR: 18 (07-08-17 @ 12:51) (17 - 18)  SpO2: 99% (07-08-17 @ 12:51) (98% - 99%)  CAPILLARY BLOOD GLUCOSE  187 (08 Jul 2017 11:28)  193 (08 Jul 2017 08:26)  176 (07 Jul 2017 22:18)        I&O's Summary    PHYSICAL EXAM:  GENERAL: NAD,   HEAD:  Atraumatic, Normocephalic  EYES: EOMI, PERRLA, conjunctiva and sclera clear  NECK: Supple, No JVD  CHEST/LUNG: Clear to auscultation bilaterally; No wheeze  HEART: Regular rate and rhythm; No murmurs, rubs, or gallops  ABDOMEN: Soft, Nontender, Nondistended; Bowel sounds present  EXTREMITIES:  Pulses, No clubbing, cyanosis, or edema  PSYCH: minimally  verbal   NEUROLOGY: functional quadriplegia   SKIN: No rashes or lesions        LABS:                        7.9    20.18 )-----------( 849      ( 08 Jul 2017 06:40 )             24.3     07-08    136  |  99  |  11  ----------------------------<  146<H>  3.6   |  23  |  0.44<L>    Ca    8.8      08 Jul 2017 06:40                RADIOLOGY & ADDITIONAL TESTS:    Imaging Personally Reviewed:    Consultant(s) Notes Reviewed:  urology     Care Discussed with Consultants/Other Providers:

## 2017-07-08 NOTE — PROGRESS NOTE ADULT - PROBLEM SELECTOR PLAN 7
Family does not want any surgical intervention, Palliative care eval for GOC discussion noted, Dtrs to d/w other family members

## 2017-07-08 NOTE — PROGRESS NOTE ADULT - SUBJECTIVE AND OBJECTIVE BOX
Subjective      Vital signs  T(F): , Max: 98.1 (07-07-17 @ 13:27)  HR: 72 (07-08-17 @ 05:51)  BP: 128/63 (07-08-17 @ 05:51)  SpO2: 98% (07-08-17 @ 05:51)    Gen  Abd      Labs    07-08 @ 06:40    WBC 20.18 / Hct 24.3  / SCr 0.44     07-07 @ 07:00    WBC 19.62 / Hct 24.3  / SCr 0.47 Subjective  No acute events overnight.  Still no BM.  Working with PT when seen this AM, PT recs not yet documented. Hct, Cr, WBC stable.    T(F): , Max: 98.1 (07-07-17 @ 13:27)  HR: 72 (07-08-17 @ 05:51)  BP: 128/63 (07-08-17 @ 05:51)  SpO2: 98% (07-08-17 @ 05:51)    Gen: NAD, resting  Abd: soft, NT, ND  : R NT in place    Labs  07-08 @ 06:40  WBC 20.18 / Hct 24.3  / SCr 0.44     07-07 @ 07:00  WBC 19.62 / Hct 24.3  / SCr 0.47

## 2017-07-08 NOTE — PHYSICAL THERAPY INITIAL EVALUATION ADULT - ADDITIONAL COMMENTS
Patient lives with daughter in a home with DANIELA; patient has a home health aide for 6 hours a day. Patient was minimally ambulatory prior to admission.

## 2017-07-08 NOTE — PROGRESS NOTE ADULT - PROBLEM SELECTOR PLAN 1
High suspicion for C diff given recent antibiotic use and leukocytosis.  Decreased frequency of diarrhea possibly 2/2 imodium use. No signs of toxic megacolon.  - will continue empiric Flagyl   - C diff PCR once pt passes stool  -pt still with no bm,  abdominal xray unremarkable   -ID consult requested for persistent leucocytosis  - recommend  if loose stool recurrs, check c diff, stool culture, giardia, Otherwise finish 10 days of empiric flagyl.  -Ct A/p noted , Marked improvement in hydronephrosis on the right.   However, ill-defined low density collection in the right posterior   pararenal space has progressed in the interim.   urology consult  noted, recommend Nephrostomy tube changed by IR,Plan for tube change by IR  for Monday, NPO after midnight on Sunday  No BM inspite of miralax, will repeat dose of Miralax and give dulcolax suppository x1 dise today

## 2017-07-08 NOTE — PROGRESS NOTE ADULT - PROBLEM SELECTOR PLAN 1
-Plan for IR NT change Monday  -Patient has multiple medical comorbidities, and is not a good surgical candidate   -No acute  intervention  -Follow up with Dr. Kurtz as an outpatient as needed

## 2017-07-09 DIAGNOSIS — D47.3 ESSENTIAL (HEMORRHAGIC) THROMBOCYTHEMIA: ICD-10-CM

## 2017-07-09 LAB
BUN SERPL-MCNC: 10 MG/DL — SIGNIFICANT CHANGE UP (ref 7–23)
CALCIUM SERPL-MCNC: 8.8 MG/DL — SIGNIFICANT CHANGE UP (ref 8.4–10.5)
CHLORIDE SERPL-SCNC: 98 MMOL/L — SIGNIFICANT CHANGE UP (ref 98–107)
CO2 SERPL-SCNC: 22 MMOL/L — SIGNIFICANT CHANGE UP (ref 22–31)
CREAT SERPL-MCNC: 0.4 MG/DL — LOW (ref 0.5–1.3)
GLUCOSE SERPL-MCNC: 162 MG/DL — HIGH (ref 70–99)
HCT VFR BLD CALC: 26.7 % — LOW (ref 34.5–45)
HGB BLD-MCNC: 8.5 G/DL — LOW (ref 11.5–15.5)
MCHC RBC-ENTMCNC: 27.3 PG — SIGNIFICANT CHANGE UP (ref 27–34)
MCHC RBC-ENTMCNC: 31.8 % — LOW (ref 32–36)
MCV RBC AUTO: 85.9 FL — SIGNIFICANT CHANGE UP (ref 80–100)
NRBC # FLD: 0 — SIGNIFICANT CHANGE UP
PLATELET # BLD AUTO: 304 K/UL — SIGNIFICANT CHANGE UP (ref 150–400)
PMV BLD: 11.4 FL — SIGNIFICANT CHANGE UP (ref 7–13)
POTASSIUM SERPL-MCNC: 3.8 MMOL/L — SIGNIFICANT CHANGE UP (ref 3.5–5.3)
POTASSIUM SERPL-SCNC: 3.8 MMOL/L — SIGNIFICANT CHANGE UP (ref 3.5–5.3)
RBC # BLD: 3.11 M/UL — LOW (ref 3.8–5.2)
RBC # FLD: 16.9 % — HIGH (ref 10.3–14.5)
SODIUM SERPL-SCNC: 135 MMOL/L — SIGNIFICANT CHANGE UP (ref 135–145)
WBC # BLD: 20.18 K/UL — HIGH (ref 3.8–10.5)
WBC # FLD AUTO: 20.18 K/UL — HIGH (ref 3.8–10.5)

## 2017-07-09 PROCEDURE — 99233 SBSQ HOSP IP/OBS HIGH 50: CPT

## 2017-07-09 RX ADMIN — QUETIAPINE FUMARATE 25 MILLIGRAM(S): 200 TABLET, FILM COATED ORAL at 12:28

## 2017-07-09 RX ADMIN — QUETIAPINE FUMARATE 25 MILLIGRAM(S): 200 TABLET, FILM COATED ORAL at 23:04

## 2017-07-09 RX ADMIN — Medication 250 MILLIGRAM(S): at 21:27

## 2017-07-09 RX ADMIN — Medication 2: at 08:40

## 2017-07-09 RX ADMIN — Medication 1: at 12:26

## 2017-07-09 RX ADMIN — Medication 250 MILLIGRAM(S): at 06:27

## 2017-07-09 RX ADMIN — APIXABAN 2.5 MILLIGRAM(S): 2.5 TABLET, FILM COATED ORAL at 06:28

## 2017-07-09 RX ADMIN — APIXABAN 2.5 MILLIGRAM(S): 2.5 TABLET, FILM COATED ORAL at 18:38

## 2017-07-09 RX ADMIN — Medication 325 MILLIGRAM(S): at 12:27

## 2017-07-09 RX ADMIN — Medication 500 MILLIGRAM(S): at 14:11

## 2017-07-09 RX ADMIN — SERTRALINE 50 MILLIGRAM(S): 25 TABLET, FILM COATED ORAL at 12:27

## 2017-07-09 RX ADMIN — Medication 500 MILLIGRAM(S): at 21:28

## 2017-07-09 RX ADMIN — Medication 75 MICROGRAM(S): at 06:28

## 2017-07-09 RX ADMIN — Medication 500 MILLIGRAM(S): at 06:27

## 2017-07-09 RX ADMIN — POLYETHYLENE GLYCOL 3350 17 GRAM(S): 17 POWDER, FOR SOLUTION ORAL at 03:59

## 2017-07-09 RX ADMIN — Medication 1: at 18:38

## 2017-07-09 RX ADMIN — QUETIAPINE FUMARATE 25 MILLIGRAM(S): 200 TABLET, FILM COATED ORAL at 06:27

## 2017-07-09 RX ADMIN — Medication 250 MILLIGRAM(S): at 14:11

## 2017-07-09 NOTE — PROGRESS NOTE ADULT - PROBLEM SELECTOR PLAN 1
-Plan for IR NT change Monday  -Patient has multiple medical comorbidities, and is not a good surgical candidate   -No acute  intervention  -Follow up with Dr. Kurtz as an outpatient as needed -Plan for IR NT change Monday  -NPO p MN with IV fluids  -Patient has multiple medical comorbidities, and is not a good surgical candidate   -No acute  intervention  -Follow up with Dr. Kurtz as an outpatient as needed -Plan for IR NT change Monday  -NPO p MN with IV fluids  -Bowel regimen--> FLEET Enema  -Patient has multiple medical comorbidities, and is not a good surgical candidate   -No acute  intervention  -Follow up with Dr. Kurtz as an outpatient as needed

## 2017-07-09 NOTE — PROGRESS NOTE ADULT - SUBJECTIVE AND OBJECTIVE BOX
Patient is a 81y old  Female who presents with a chief complaint of Diarrhea (07 Jul 2017 10:50)      SUBJECTIVE / OVERNIGHT EVENTS:  no event. family reports constipation     MEDICATIONS  (STANDING):  metroNIDAZOLE    Tablet 500 milliGRAM(s) Oral every 8 hours  apixaban 2.5 milliGRAM(s) Oral two times a day  sertraline 50 milliGRAM(s) Oral daily  atorvastatin 10 milliGRAM(s) Oral at bedtime  ferrous    sulfate 325 milliGRAM(s) Oral daily  levothyroxine 75 MICROGram(s) Oral daily  insulin lispro (HumaLOG) corrective regimen sliding scale   SubCutaneous three times a day before meals  insulin lispro (HumaLOG) corrective regimen sliding scale   SubCutaneous at bedtime  dextrose 5%. 1000 milliLiter(s) (50 mL/Hr) IV Continuous <Continuous>  dextrose 50% Injectable 12.5 Gram(s) IV Push once  dextrose 50% Injectable 25 Gram(s) IV Push once  dextrose 50% Injectable 25 Gram(s) IV Push once  sodium chloride 0.9%. 1000 milliLiter(s) (50 mL/Hr) IV Continuous <Continuous>  valproic  acid Syrup 250 milliGRAM(s) Oral three times a day    MEDICATIONS  (PRN):  dextrose Gel 1 Dose(s) Oral once PRN Blood Glucose LESS THAN 70 milliGRAM(s)/deciliter  glucagon  Injectable 1 milliGRAM(s) IntraMuscular once PRN Glucose LESS THAN 70 milligrams/deciliter  QUEtiapine 25 milliGRAM(s) Oral every 4 hours PRN For agitation  artificial  tears Solution 1 Drop(s) Both EYES four times a day PRN Dry Eyes  polyethylene glycol 3350 17 Gram(s) Oral daily PRN Constipation  acetaminophen    Suspension. 650 milliGRAM(s) Oral every 6 hours PRN Mild Pain (1 - 3) and moderate pain  bisacodyl Suppository 10 milliGRAM(s) Rectal daily PRN Constipation      Vital Signs Last 24 Hrs  T(C): 36.8 (07-09-17 @ 06:26), Max: 36.9 (07-08-17 @ 12:51)  HR: 83 (07-09-17 @ 06:26) (72 - 83)  BP: 133/62 (07-09-17 @ 06:26) (107/67 - 133/62)  RR: 18 (07-09-17 @ 06:26) (17 - 18)  SpO2: 97% (07-09-17 @ 06:26) (97% - 99%)  CAPILLARY BLOOD GLUCOSE  210 (09 Jul 2017 08:33)  194 (08 Jul 2017 21:44)  211 (08 Jul 2017 16:47)        I&O's Summary      PHYSICAL EXAM:  GENERAL: NAD, well-developed  HEAD:  Atraumatic, Normocephalic  NECK: Supple, No JVD  CHEST/LUNG: Clear to auscultation bilaterally; No wheeze  HEART: s1 s2  ABDOMEN: Soft, Nontender, Nondistended; Bowel sounds present  EXTREMITIES:  2+ Peripheral Pulses, No clubbing, cyanosis, or edema  PSYCH: awake, alert  NEUROLOGY: non-focal  SKIN: No rashes or lesions    LABS:                        8.5    20.18 )-----------( 304      ( 09 Jul 2017 06:26 )             26.7     07-09    135  |  98  |  10  ----------------------------<  162<H>  3.8   |  22  |  0.40<L>    Ca    8.8      09 Jul 2017 06:26                RADIOLOGY & ADDITIONAL TESTS:    Imaging Personally Reviewed:    Consultant(s) Notes Reviewed:      Care Discussed with Consultants/Other Providers:

## 2017-07-09 NOTE — PROGRESS NOTE ADULT - PROBLEM SELECTOR PLAN 1
resolved   - will continue empiric Flagyl   -pt still with no bm,  abdominal xray unremarkable   -ID consult requested for persistent leucocytosis  - recommend  if loose stool recurrs, check c diff, stool culture, giardia, Otherwise finish 10 days of empiric flagyl.  -Ct A/p noted , Marked improvement in hydronephrosis on the right.   However, ill-defined low density collection in the right posterior   pararenal space has progressed in the interim.   urology consult  noted, recommend Nephrostomy tube changed by IR,Plan for tube change by IR  for Monday, NPO after midnight on Sunday

## 2017-07-09 NOTE — PROGRESS NOTE ADULT - SUBJECTIVE AND OBJECTIVE BOX
Subjective      Vital signs  T(F): , Max: 98.5 (07-08-17 @ 12:51)  HR: 72 (07-08-17 @ 21:44)  BP: 119/67 (07-08-17 @ 21:44)  SpO2: 98% (07-08-17 @ 21:44)    Gen  Abd      Labs  07-08 @ 06:40  WBC 20.18 / Hct 24.3  / SCr 0.44     07-07 @ 07:00  WBC 19.62 / Hct 24.3  / SCr 0.47 Subjective: Pt set for IR nephrostomy tube exchange tomorrow. PEr IR Eliquis does not need to be held.   Per daughter-> Pt continues to have constipation. Requesting an enema. Currently taking miralax with dulcolax suppository PRN.   No c/o pain. Denies n/v/f/c. However, pt with continued elevated wbc.       Vital signs  T(F): , Max: 98.5 (07-08-17 @ 12:51)  HR: 72 (07-08-17 @ 21:44)  BP: 119/67 (07-08-17 @ 21:44)  SpO2: 98% (07-08-17 @ 21:44)    Voids: Not recorded    Gen NAD  Abd Soft/ Nontender. Mildly distended. Right NT in place. No erythema or drainage at site. Secured with tape.     Labs  07-08 @ 06:40  WBC 20.18 / Hct 24.3  / SCr 0.44     07-07 @ 07:00  WBC 19.62 / Hct 24.3  / SCr 0.47 Subjective: Pt set for IR nephrostomy tube exchange tomorrow. Per IR-->  Eliquis does not need to be held.   Per daughter-> Pt continues to have constipation. Requesting an enema. Currently taking miralax with dulcolax suppository PRN.   No c/o pain. Denies n/v/f/c. However, pt with continued elevated wbc.       Vital signs  T(F): , Max: 98.5 (07-08-17 @ 12:51)  HR: 72 (07-08-17 @ 21:44)  BP: 119/67 (07-08-17 @ 21:44)  SpO2: 98% (07-08-17 @ 21:44)    Voids: Not recorded    Gen NAD  Abd Soft/ Nontender. Mildly distended. Right NT in place. No erythema or drainage at site. Secured with tape.     Labs  07-08 @ 06:40  WBC 20.18 / Hct 24.3  / SCr 0.44     07-07 @ 07:00  WBC 19.62 / Hct 24.3  / SCr 0.47

## 2017-07-10 LAB
BUN SERPL-MCNC: 11 MG/DL — SIGNIFICANT CHANGE UP (ref 7–23)
C DIFF TOX GENS STL QL NAA+PROBE: SIGNIFICANT CHANGE UP
CALCIUM SERPL-MCNC: 8.9 MG/DL — SIGNIFICANT CHANGE UP (ref 8.4–10.5)
CHLORIDE SERPL-SCNC: 101 MMOL/L — SIGNIFICANT CHANGE UP (ref 98–107)
CO2 SERPL-SCNC: 24 MMOL/L — SIGNIFICANT CHANGE UP (ref 22–31)
CREAT SERPL-MCNC: 0.42 MG/DL — LOW (ref 0.5–1.3)
GLUCOSE SERPL-MCNC: 191 MG/DL — HIGH (ref 70–99)
HCT VFR BLD CALC: 26.7 % — LOW (ref 34.5–45)
HGB BLD-MCNC: 8.3 G/DL — LOW (ref 11.5–15.5)
MCHC RBC-ENTMCNC: 26.8 PG — LOW (ref 27–34)
MCHC RBC-ENTMCNC: 31.1 % — LOW (ref 32–36)
MCV RBC AUTO: 86.1 FL — SIGNIFICANT CHANGE UP (ref 80–100)
NRBC # FLD: 0 — SIGNIFICANT CHANGE UP
PLATELET # BLD AUTO: 683 K/UL — HIGH (ref 150–400)
PMV BLD: 10.6 FL — SIGNIFICANT CHANGE UP (ref 7–13)
POTASSIUM SERPL-MCNC: 4.3 MMOL/L — SIGNIFICANT CHANGE UP (ref 3.5–5.3)
POTASSIUM SERPL-SCNC: 4.3 MMOL/L — SIGNIFICANT CHANGE UP (ref 3.5–5.3)
RBC # BLD: 3.1 M/UL — LOW (ref 3.8–5.2)
RBC # FLD: 17.1 % — HIGH (ref 10.3–14.5)
SODIUM SERPL-SCNC: 138 MMOL/L — SIGNIFICANT CHANGE UP (ref 135–145)
WBC # BLD: 18.74 K/UL — HIGH (ref 3.8–10.5)
WBC # FLD AUTO: 18.74 K/UL — HIGH (ref 3.8–10.5)

## 2017-07-10 PROCEDURE — 50435 EXCHANGE NEPHROSTOMY CATH: CPT | Mod: RT

## 2017-07-10 PROCEDURE — 99233 SBSQ HOSP IP/OBS HIGH 50: CPT

## 2017-07-10 RX ORDER — ACETAMINOPHEN 500 MG
650 TABLET ORAL ONCE
Qty: 0 | Refills: 0 | Status: COMPLETED | OUTPATIENT
Start: 2017-07-10 | End: 2017-07-10

## 2017-07-10 RX ORDER — VANCOMYCIN HCL 1 G
500 VIAL (EA) INTRAVENOUS ONCE
Qty: 0 | Refills: 0 | Status: COMPLETED | OUTPATIENT
Start: 2017-07-10 | End: 2017-07-10

## 2017-07-10 RX ADMIN — Medication 250 MILLIGRAM(S): at 22:09

## 2017-07-10 RX ADMIN — Medication 325 MILLIGRAM(S): at 11:59

## 2017-07-10 RX ADMIN — SODIUM CHLORIDE 50 MILLILITER(S): 9 INJECTION INTRAMUSCULAR; INTRAVENOUS; SUBCUTANEOUS at 05:10

## 2017-07-10 RX ADMIN — Medication 100 MILLIGRAM(S): at 22:10

## 2017-07-10 RX ADMIN — Medication 1: at 22:09

## 2017-07-10 RX ADMIN — Medication 2: at 08:35

## 2017-07-10 RX ADMIN — Medication 75 MICROGRAM(S): at 05:09

## 2017-07-10 RX ADMIN — Medication 2: at 11:58

## 2017-07-10 RX ADMIN — Medication 500 MILLIGRAM(S): at 14:24

## 2017-07-10 RX ADMIN — SERTRALINE 50 MILLIGRAM(S): 25 TABLET, FILM COATED ORAL at 11:59

## 2017-07-10 RX ADMIN — Medication 250 MILLIGRAM(S): at 05:09

## 2017-07-10 RX ADMIN — QUETIAPINE FUMARATE 25 MILLIGRAM(S): 200 TABLET, FILM COATED ORAL at 22:15

## 2017-07-10 RX ADMIN — Medication 250 MILLIGRAM(S): at 14:24

## 2017-07-10 RX ADMIN — Medication 1: at 17:50

## 2017-07-10 RX ADMIN — ATORVASTATIN CALCIUM 10 MILLIGRAM(S): 80 TABLET, FILM COATED ORAL at 22:09

## 2017-07-10 RX ADMIN — Medication 10 MILLIGRAM(S): at 01:05

## 2017-07-10 RX ADMIN — APIXABAN 2.5 MILLIGRAM(S): 2.5 TABLET, FILM COATED ORAL at 05:09

## 2017-07-10 RX ADMIN — Medication 650 MILLIGRAM(S): at 21:05

## 2017-07-10 RX ADMIN — APIXABAN 2.5 MILLIGRAM(S): 2.5 TABLET, FILM COATED ORAL at 17:51

## 2017-07-10 RX ADMIN — Medication 500 MILLIGRAM(S): at 22:09

## 2017-07-10 RX ADMIN — Medication 500 MILLIGRAM(S): at 05:09

## 2017-07-10 NOTE — PROGRESS NOTE ADULT - SUBJECTIVE AND OBJECTIVE BOX
Patient is a 81y old  Female who presents with a chief complaint of Diarrhea (07 Jul 2017 10:50)      SUBJECTIVE / OVERNIGHT EVENTS:  No event    MEDICATIONS  (STANDING):  metroNIDAZOLE    Tablet 500 milliGRAM(s) Oral every 8 hours  apixaban 2.5 milliGRAM(s) Oral two times a day  sertraline 50 milliGRAM(s) Oral daily  atorvastatin 10 milliGRAM(s) Oral at bedtime  ferrous    sulfate 325 milliGRAM(s) Oral daily  levothyroxine 75 MICROGram(s) Oral daily  insulin lispro (HumaLOG) corrective regimen sliding scale   SubCutaneous three times a day before meals  insulin lispro (HumaLOG) corrective regimen sliding scale   SubCutaneous at bedtime  dextrose 5%. 1000 milliLiter(s) (50 mL/Hr) IV Continuous <Continuous>  dextrose 50% Injectable 12.5 Gram(s) IV Push once  dextrose 50% Injectable 25 Gram(s) IV Push once  dextrose 50% Injectable 25 Gram(s) IV Push once  sodium chloride 0.9%. 1000 milliLiter(s) (50 mL/Hr) IV Continuous <Continuous>  valproic  acid Syrup 250 milliGRAM(s) Oral three times a day    MEDICATIONS  (PRN):  dextrose Gel 1 Dose(s) Oral once PRN Blood Glucose LESS THAN 70 milliGRAM(s)/deciliter  glucagon  Injectable 1 milliGRAM(s) IntraMuscular once PRN Glucose LESS THAN 70 milligrams/deciliter  QUEtiapine 25 milliGRAM(s) Oral every 4 hours PRN For agitation  artificial  tears Solution 1 Drop(s) Both EYES four times a day PRN Dry Eyes  polyethylene glycol 3350 17 Gram(s) Oral daily PRN Constipation  acetaminophen    Suspension. 650 milliGRAM(s) Oral every 6 hours PRN Mild Pain (1 - 3) and moderate pain  bisacodyl Suppository 10 milliGRAM(s) Rectal daily PRN Constipation      Vital Signs Last 24 Hrs  T(C): 36.9 (07-10-17 @ 12:34), Max: 37.2 (07-10-17 @ 05:05)  HR: 73 (07-10-17 @ 12:34) (73 - 85)  BP: 109/60 (07-10-17 @ 12:34) (109/60 - 138/65)  RR: 19 (07-10-17 @ 12:34) (16 - 19)  SpO2: 99% (07-10-17 @ 12:34) (96% - 99%)  CAPILLARY BLOOD GLUCOSE  202 (10 Jul 2017 11:48)  202 (10 Jul 2017 08:29)  165 (09 Jul 2017 21:13)  172 (09 Jul 2017 16:30)        I&O's Summary    10 Jul 2017 07:01  -  10 Jul 2017 16:08  --------------------------------------------------------  IN: 0 mL / OUT: 800 mL / NET: -800 mL        PHYSICAL EXAM:  GENERAL: NAD  HEAD:  Atraumatic, Normocephalic  CHEST/LUNG: non-labored   HEART: s1 s2  ABDOMEN: Soft, Nontender, Nondistended  EXTREMITIES:  2+ Peripheral Pulses, No clubbing, cyanosis, or edema  PSYCH: awake  NEUROLOGY: non-focal  SKIN: No rashes or lesions    LABS:                        8.3    18.74 )-----------( 683      ( 10 Jul 2017 07:40 )             26.7     07-10    138  |  101  |  11  ----------------------------<  191<H>  4.3   |  24  |  0.42<L>    Ca    8.9      10 Jul 2017 07:40                RADIOLOGY & ADDITIONAL TESTS:    Imaging Personally Reviewed:    Consultant(s) Notes Reviewed:      Care Discussed with Consultants/Other Providers:

## 2017-07-10 NOTE — PROGRESS NOTE ADULT - PROBLEM SELECTOR PLAN 1
-Post IR NT procedure check  -NPO p MN with IV fluid  -Bowel regimen  -Patient has multiple medical comorbidities, and is not a good surgical candidate   -No acute  intervention  -Follow up with Dr. Kurtz as an outpatient as needed -Planning for stent change  -Bowel regimen  -Patient has multiple medical comorbidities, and is not a good surgical candidate   -No acute  intervention  -Follow up with Dr. Kurtz as an outpatient as needed -Bowel regimen  -Patient has multiple medical comorbidities, and is not a good surgical candidate   -No acute  intervention  -Follow up with Dr. Kurtz as an outpatient as needed

## 2017-07-10 NOTE — PROGRESS NOTE ADULT - PROBLEM SELECTOR PLAN 1
resolved   - will continue empiric Flagyl   -abdominal xray unremarkable   -ID consult requested for persistent leucocytosis  - recommend  if loose stool recurrs, check c diff, stool culture, giardia, Otherwise finish 10 days of empiric flagyl.  -Ct A/p noted , Marked improvement in hydronephrosis on the right.   However, ill-defined low density collection in the right posterior   pararenal space has progressed in the interim.   urology consult  noted, recommend Nephrostomy tube changed by IR, performed on 7/10.

## 2017-07-10 NOTE — PROGRESS NOTE ADULT - ASSESSMENT
81 year old Female with Medical History of HTN, DM, advanced dementia, DVT on Eliquis, hypothyroidism, hydronephrosis 2/2 R renal pelvis mass s/p nephrostomy tube p/w diarrhea.  Admitted for diarrhea and leukocytosis.

## 2017-07-10 NOTE — PROGRESS NOTE ADULT - PROBLEM SELECTOR PLAN 6
Discussed with patient's daughters as follow up for advanced directives  Pt has 6 daughters/sons family will like time to discuss further decisions  Currently they agree that they would not like to pursue further workup for renal mass, would like to continue with ongoing treatment.  Still pending in decision as to disposition and further details of GOC, all information/support given to family. Discussed with patient's daughters as follow up for advanced directives  Pt has 6 daughters/sons family will like time to discuss further decisions  Currently they agree that they would not like to pursue further workup for renal mass, would like to continue with ongoing treatment.  Family decision still pending, all information provided to family as to disposition and further details of GOC, at this point will sign off, once patient's family comes to a decision primary team can address dispo planning. Once decision is reached patient would benefit from MOLST which primary team can provide.  Reconsult as needed.

## 2017-07-10 NOTE — PROGRESS NOTE ADULT - SUBJECTIVE AND OBJECTIVE BOX
HPI:  81 year old Female with Medical History of HTN, DM, advanced dementia, DVT on Eliquis, hypothyroidism, hydronephrosis 2/2 R renal pelvis mass s/p nephrostomy tube p/w diarrhea.  Admitted for diarrhea and perinephric abscess. Change of nephrostomy tube done.    Patient seen and examined at bedside, sleeping comfortably. Discussion with patient's daughters --> Vera and Emily still pending decision in regards of disposition and goals of care. Vera asked for a list of NH facilites pending decision as to where they would want patient to be.    PERTINENT PM/SXH:   Renal mass, right  Kidney mass  HTN (hypertension)  Diabetes  Alzheimer's dementia with behavioral disturbance  Alzheimer disease  T2DM (type 2 diabetes mellitus)  HTN (hypertension)  Hypothyroid    Gallstones  History of kidney stones  S/P cholecystectomy    SOCIAL HISTORY:   Significant other/partner:  [x] YES  [ ] NO               Children:  [x] YES  [ ] NO                   Jewish/Spirituality: Yes  Substance hx:  [ ] YES   [x] NO                   Tobacco hx:  [ ] YES  [x] NO                       Alcohol hx: [ ] YES  [x] NO         Home Opioid hx:  [ ] YES  [x] NO   Living Situation: [x] Home  [ ] Long term care  [ ] Rehab [ ] Other    FAMILY HISTORY:  No pertinent family history in first degree relatives  No pertinent family history in first degree relatives    [x] Family history non-contributory     BASELINE (I)ADLs (prior to admission):  [place]: [ ] total  [ ] moderate [ ] dependent    ADVANCE DIRECTIVES:    Full Code  MOLST  [ ] YES [X] NO                      [ ] Completed  Health Care Proxy [ ] YES  [X] NO   [ ] Completed  Living Will  [ ] YES [X] NO             [X] Surrogate  [ ] HCP  [ ] Guardian: Mainly Vera (daughter) and                 Phone#:    Allergies    adhesives (Rash)  No Known Drug Allergies    Intolerances    MEDICATIONS  (STANDING):  metroNIDAZOLE    Tablet 500 milliGRAM(s) Oral every 8 hours  apixaban 2.5 milliGRAM(s) Oral two times a day  diVALproex  milliGRAM(s) Oral three times a day  sertraline 50 milliGRAM(s) Oral daily  atorvastatin 10 milliGRAM(s) Oral at bedtime  ferrous    sulfate 325 milliGRAM(s) Oral daily  levothyroxine 75 MICROGram(s) Oral daily  insulin lispro (HumaLOG) corrective regimen sliding scale   SubCutaneous three times a day before meals  insulin lispro (HumaLOG) corrective regimen sliding scale   SubCutaneous at bedtime  dextrose 5%. 1000 milliLiter(s) (50 mL/Hr) IV Continuous <Continuous>  dextrose 50% Injectable 12.5 Gram(s) IV Push once  dextrose 50% Injectable 25 Gram(s) IV Push once  dextrose 50% Injectable 25 Gram(s) IV Push once  sodium chloride 0.9%. 1000 milliLiter(s) (50 mL/Hr) IV Continuous <Continuous>    MEDICATIONS  (PRN):  dextrose Gel 1 Dose(s) Oral once PRN Blood Glucose LESS THAN 70 milliGRAM(s)/deciliter  glucagon  Injectable 1 milliGRAM(s) IntraMuscular once PRN Glucose LESS THAN 70 milligrams/deciliter  QUEtiapine 25 milliGRAM(s) Oral every 4 hours PRN For agitation  artificial  tears Solution 1 Drop(s) Both EYES four times a day PRN Dry Eyes  polyethylene glycol 3350 17 Gram(s) Oral daily PRN Constipation      PRESENT SYMPTOMS:  Source: [ ] Patient   [x] Family   [ ] Team     Pain:                        [ ] No [x] Yes             [x] Mild [ ] Moderate [ ] Severe    Dyspnea:                [x] No [ ] Yes             [ ] Mild [ ] Moderate [ ] Severe    Anxiety:                  [x] No [ ] Yes             [ ] Mild [ ] Moderate [ ] Severe    Fatigue:                  [x] No [ ] Yes             [ ] Mild [ ] Moderate [ ] Severe    Nausea:                  [x] No [ ] Yes             [ ] Mild [ ] Moderate [ ] Severe    Loss of appetite:   [x] No [ ] Yes             [ ] Mild [ ] Moderate [ ] Severe    Constipation:        [x] No [ ] Yes             [ ] Mild [ ] Moderate [ ] Severe    Other Symptoms:  [ ] All other review of systems negative   [x] Unable to obtain due to poor mentation     Karnofsky Performance Score/Palliative Performance Status Version 2:  40-50  %    PHYSICAL EXAM:  Vital Signs Last 24 Hrs  T(C): 36.7 (07 Jul 2017 13:27), Max: 37.7 (06 Jul 2017 21:41)  T(F): 98.1 (07 Jul 2017 13:27), Max: 99.9 (06 Jul 2017 21:41)  HR: 76 (07 Jul 2017 13:27) (71 - 77)  BP: 127/60 (07 Jul 2017 13:27) (112/52 - 127/60)  BP(mean): --  RR: 19 (07 Jul 2017 13:27) (19 - 19)  SpO2: 95% (07 Jul 2017 13:27) (93% - 97%) I&O's Summary    General:  [x] Alert  [ ] Oriented x      [ ] Lethargic  [ ] Agitated   [ ] Cachexia   [ ] Unarousable  [x] Verbal  [ ] Non-Verbal    HEENT:  [x] Normal   [ ] Dry mouth   [ ] ET Tube    [ ] Trach  [ ] Oral lesions    Lungs:   [x] Clear [ ] Tachypnea  [ ] Audible excessive secretions   [ ] Rhonchi        [ ] Right [ ] Left [ ] Bilateral  [ ] Crackles        [ ] Right [ ] Left [ ] Bilateral  [ ] Wheezing     [ ] Right [ ] Left [ ] Bilateral    Cardiovascular:  [x] Regular [ ] Irregular [ ] Tachycardia   [ ] Bradycardia  [ ] Murmur [ ] Other    Abdomen: [x] Soft  [ ] Distended   [ ] +BS  [ ] Non tender [ ] Tender  [ ]PEG   [ ]OGT/ NGT   Last BM:       Genitourinary: [ ] Normal [x] Incontinent   [ ] Oliguria/Anuria   [ ] Burrows    Musculoskeletal:  [ ] Normal   [x] Weakness  [ ] Bedbound/Wheelchair bound [ ] Edema    Neurological: [x] No focal deficits  [ ] Cognitive impairment  [ ] Dysphagia [ ] Dysarthria [ ] Paresis [ ] Other     Skin: [x] Normal   [ ] Pressure ulcer(s)                  [ ] Rash    LABS:                        7.7    19.62 )-----------( 835      ( 07 Jul 2017 07:00 )             24.3     07-07    137  |  98  |  10  ----------------------------<  155<H>  3.6   |  26  |  0.47<L>    Ca    8.8      07 Jul 2017 07:00    Oral Intake: [ ] Unable/mouth care only [x] Minimal [ ] Moderate [ ] Full Capability  Diet: [ ] NPO [ ] Tube feeds [ ] TPN [x] Other     RADIOLOGY & ADDITIONAL STUDIES: < from: CT Abdomen and Pelvis w/ IV Cont (07.05.17 @ 20:21) >   ill-defined low density collection in the right posterior   pararenal space has progressed in the interim. Soft tissue density again   identified in the right renal pelvis. Associated adenopathy. High density   material is identified within the right ureter adjacent to the stent and   is uncertain if this represents contrast or stones.    < end of copied text >      REFERRALS:   [ ] Chaplaincy  [ ] Hospice  [ ] Child Life  [ ] Social Work  [ ] Case management [ ] Holistic Therapy

## 2017-07-10 NOTE — PROGRESS NOTE ADULT - SUBJECTIVE AND OBJECTIVE BOX
Subjective  Pt to have IR exchange of NT today.  Still with persistent leukocytosis, 18 from 20.    Vital signs  T(F): , Max: 98.9 (07-10-17 @ 05:05)  HR: 73 (07-10-17 @ 12:34)  BP: 109/60 (07-10-17 @ 12:34)  SpO2: 99% (07-10-17 @ 12:34)        Gen  Abd      Labs      07-10 @ 07:40  WBC 18.74 / Hct 26.7  / SCr 0.42     07-09 @ 06:26  WBC 20.18 / Hct 26.7  / SCr 0.40 Subjective  Pt had IR exchange of NT today, no apparent complications.  Per Dr. Herrera the R ureteral stent also needs to be replaced.  Spoke with family, who states that March. Still with persistent leukocytosis, 18 from 20.    Vital signs  T(F): , Max: 98.9 (07-10-17 @ 05:05)  HR: 73 (07-10-17 @ 12:34)  BP: 109/60 (07-10-17 @ 12:34)  SpO2: 99% (07-10-17 @ 12:34)        Gen NAD, sleeping  Abd soft, NT, ND   R NT in place, no strikethrough on dressing.  Clear yellow urine draining.     Labs  07-10 @ 07:40  WBC 18.74 / Hct 26.7  / SCr 0.42     07-09 @ 06:26  WBC 20.18 / Hct 26.7  / SCr 0.40 Subjective  Pt had IR exchange of NT today, no apparent complications.  Per Dr. Herrera the R ureteral stent also needs to be replaced.  Spoke with family, who states that the stent was placed in March. Still with persistent leukocytosis, 18 from 20.    Vital signs  T(F): , Max: 98.9 (07-10-17 @ 05:05)  HR: 73 (07-10-17 @ 12:34)  BP: 109/60 (07-10-17 @ 12:34)  SpO2: 99% (07-10-17 @ 12:34)        Gen NAD, sleeping  Abd soft, NT, ND   R NT in place, no strikethrough on dressing.  Clear yellow urine draining.     Labs  07-10 @ 07:40  WBC 18.74 / Hct 26.7  / SCr 0.42     07-09 @ 06:26  WBC 20.18 / Hct 26.7  / SCr 0.40

## 2017-07-11 DIAGNOSIS — A41.9 SEPSIS, UNSPECIFIED ORGANISM: ICD-10-CM

## 2017-07-11 LAB
BUN SERPL-MCNC: 12 MG/DL — SIGNIFICANT CHANGE UP (ref 7–23)
CALCIUM SERPL-MCNC: 8.9 MG/DL — SIGNIFICANT CHANGE UP (ref 8.4–10.5)
CHLORIDE SERPL-SCNC: 101 MMOL/L — SIGNIFICANT CHANGE UP (ref 98–107)
CO2 SERPL-SCNC: 28 MMOL/L — SIGNIFICANT CHANGE UP (ref 22–31)
CREAT SERPL-MCNC: 0.39 MG/DL — LOW (ref 0.5–1.3)
GLUCOSE SERPL-MCNC: 205 MG/DL — HIGH (ref 70–99)
HCT VFR BLD CALC: 24.3 % — LOW (ref 34.5–45)
HGB BLD-MCNC: 7.9 G/DL — LOW (ref 11.5–15.5)
MCHC RBC-ENTMCNC: 27.8 PG — SIGNIFICANT CHANGE UP (ref 27–34)
MCHC RBC-ENTMCNC: 32.5 % — SIGNIFICANT CHANGE UP (ref 32–36)
MCV RBC AUTO: 85.6 FL — SIGNIFICANT CHANGE UP (ref 80–100)
NRBC # FLD: 0 — SIGNIFICANT CHANGE UP
PLATELET # BLD AUTO: 812 K/UL — HIGH (ref 150–400)
PMV BLD: 10.3 FL — SIGNIFICANT CHANGE UP (ref 7–13)
POTASSIUM SERPL-MCNC: 3.5 MMOL/L — SIGNIFICANT CHANGE UP (ref 3.5–5.3)
POTASSIUM SERPL-SCNC: 3.5 MMOL/L — SIGNIFICANT CHANGE UP (ref 3.5–5.3)
RBC # BLD: 2.84 M/UL — LOW (ref 3.8–5.2)
RBC # FLD: 16.9 % — HIGH (ref 10.3–14.5)
SODIUM SERPL-SCNC: 139 MMOL/L — SIGNIFICANT CHANGE UP (ref 135–145)
SPECIMEN SOURCE: SIGNIFICANT CHANGE UP
SPECIMEN SOURCE: SIGNIFICANT CHANGE UP
WBC # BLD: 19.11 K/UL — HIGH (ref 3.8–10.5)
WBC # FLD AUTO: 19.11 K/UL — HIGH (ref 3.8–10.5)

## 2017-07-11 PROCEDURE — 99233 SBSQ HOSP IP/OBS HIGH 50: CPT

## 2017-07-11 PROCEDURE — 99231 SBSQ HOSP IP/OBS SF/LOW 25: CPT

## 2017-07-11 RX ORDER — PIPERACILLIN AND TAZOBACTAM 4; .5 G/20ML; G/20ML
3.38 INJECTION, POWDER, LYOPHILIZED, FOR SOLUTION INTRAVENOUS EVERY 8 HOURS
Qty: 0 | Refills: 0 | Status: DISCONTINUED | OUTPATIENT
Start: 2017-07-11 | End: 2017-07-19

## 2017-07-11 RX ADMIN — Medication 500 MILLIGRAM(S): at 21:52

## 2017-07-11 RX ADMIN — APIXABAN 2.5 MILLIGRAM(S): 2.5 TABLET, FILM COATED ORAL at 05:11

## 2017-07-11 RX ADMIN — Medication 250 MILLIGRAM(S): at 05:11

## 2017-07-11 RX ADMIN — PIPERACILLIN AND TAZOBACTAM 25 GRAM(S): 4; .5 INJECTION, POWDER, LYOPHILIZED, FOR SOLUTION INTRAVENOUS at 21:53

## 2017-07-11 RX ADMIN — SODIUM CHLORIDE 50 MILLILITER(S): 9 INJECTION INTRAMUSCULAR; INTRAVENOUS; SUBCUTANEOUS at 05:11

## 2017-07-11 RX ADMIN — APIXABAN 2.5 MILLIGRAM(S): 2.5 TABLET, FILM COATED ORAL at 17:54

## 2017-07-11 RX ADMIN — Medication 500 MILLIGRAM(S): at 13:01

## 2017-07-11 RX ADMIN — SERTRALINE 50 MILLIGRAM(S): 25 TABLET, FILM COATED ORAL at 13:00

## 2017-07-11 RX ADMIN — Medication 500 MILLIGRAM(S): at 05:11

## 2017-07-11 RX ADMIN — ATORVASTATIN CALCIUM 10 MILLIGRAM(S): 80 TABLET, FILM COATED ORAL at 21:52

## 2017-07-11 RX ADMIN — Medication 2: at 08:45

## 2017-07-11 RX ADMIN — Medication 2: at 17:54

## 2017-07-11 RX ADMIN — PIPERACILLIN AND TAZOBACTAM 25 GRAM(S): 4; .5 INJECTION, POWDER, LYOPHILIZED, FOR SOLUTION INTRAVENOUS at 14:34

## 2017-07-11 RX ADMIN — Medication 3: at 12:20

## 2017-07-11 RX ADMIN — Medication 250 MILLIGRAM(S): at 21:53

## 2017-07-11 RX ADMIN — Medication 75 MICROGRAM(S): at 05:11

## 2017-07-11 RX ADMIN — Medication 650 MILLIGRAM(S): at 10:28

## 2017-07-11 RX ADMIN — Medication 650 MILLIGRAM(S): at 10:59

## 2017-07-11 RX ADMIN — Medication 250 MILLIGRAM(S): at 13:00

## 2017-07-11 RX ADMIN — Medication 325 MILLIGRAM(S): at 12:20

## 2017-07-11 RX ADMIN — QUETIAPINE FUMARATE 25 MILLIGRAM(S): 200 TABLET, FILM COATED ORAL at 22:59

## 2017-07-11 NOTE — PROGRESS NOTE ADULT - PROBLEM SELECTOR PLAN 6
- continue Depakote and Zoloft  -pt on PRN Seroquel  for agitation  -functional quadriplegia : supportive care / assistance with ADLs  - Glucerna for dietary supplement   -skin care as per protocol

## 2017-07-11 NOTE — PROGRESS NOTE ADULT - PROBLEM SELECTOR PLAN 1
-Planning for stent change  -Bowel regimen  -Patient has multiple medical comorbidities, and is not a good surgical candidate   -Follow up goals of care

## 2017-07-11 NOTE — PROGRESS NOTE ADULT - SUBJECTIVE AND OBJECTIVE BOX
Subjective  Febrile overnight to 101.3 @ 8pm.  Patient had R NT changed with IR yesterday.  Indwelling ureteral stent not exchanged, per IR this needs to be done by urology over a wire.  Leukocytosis stable.  Was seen by palliative care yesterday, per note, family does not wish to pursue workup of the renal mass, but does wish to continue current treatment with NT/stent.     Vital signs  T(F): , Max: 101.3 (07-10-17 @ 19:41)  HR: 69 (07-11-17 @ 05:09)  BP: 114/65 (07-11-17 @ 05:09)  SpO2: 95% (07-11-17 @ 05:09)    Gen  Abd      Labs  07-11 @ 07:10  WBC 19.11 / Hct 24.3  / SCr 0.39     07-10 @ 07:40  WBC 18.74 / Hct 26.7  / SCr 0.42 Subjective  Patient had R NT changed with IR yesterday.  Indwelling ureteral stent not exchanged, per IR thisshould be done by urology over a wire. Febrile overnight to 101.3 @ 8pm.  Leukocytosis stable.  Was seen by palliative care yesterday, per note, family does not wish to pursue workup of the renal mass, but does wish to continue current treatment with NT/stent.     Vital signs  T(F): , Max: 101.3 (07-10-17 @ 19:41)  HR: 69 (07-11-17 @ 05:09)  BP: 114/65 (07-11-17 @ 05:09)  SpO2: 95% (07-11-17 @ 05:09)    Gen Agitated, crying  Abd soft, NT, ND   R NT in place draining clear yellow urine    Labs  07-11 @ 07:10  WBC 19.11 / Hct 24.3  / SCr 0.39     07-10 @ 07:40  WBC 18.74 / Hct 26.7  / SCr 0.42

## 2017-07-11 NOTE — PROGRESS NOTE ADULT - SUBJECTIVE AND OBJECTIVE BOX
Follow Up:  diarrhea    Interval History/ROS: s/p exchange of percutaneous nephrostomy yesterday. new fever    Allergies  adhesives (Rash)  No Known Drug Allergies    ANTIMICROBIALS:   metroNIDAZOLE    Tablet 500 every 8 hours    MEDICATIONS  (STANDING):  apixaban 2.5 two times a day  sertraline 50 daily  atorvastatin 10 at bedtime  levothyroxine 75 daily  insulin lispro (HumaLOG) corrective regimen sliding scale  three times a day before meals  insulin lispro (HumaLOG) corrective regimen sliding scale  at bedtime  valproic  acid Syrup 250 three times a day    Vital Signs Last 24 Hrs  T(F): 98.3 (07-11-17 @ 12:01), Max: 101.3 (07-10-17 @ 19:41)  HR: 69 (07-11-17 @ 12:01)  BP: 103/51 (07-11-17 @ 12:01)  RR: 17 (07-11-17 @ 12:01)  SpO2: 95% (07-11-17 @ 12:01) (95% - 97%)  Wt(kg): --    PHYSICAL EXAM:  General: non-toxic  HEAD/EYES: white sclera  ENT:  supple  Cardiovascular:   S1, S2  Respiratory: clear  GI:  nontender  :  NT with cloudy urine  Musculoskeletal: no synovitis  Neurologic:  non-focal exam   Skin: no rash  Lymph:no lymphadenopathy  Psychiatric:  agitated  Lines: no phlebitis                        7.9    19.11 )-----------( 812      ( 11 Jul 2017 07:10 )             24.3 07-11    139  |  101  |  12  ----------------------------<  205  3.5   |  28  |  0.39  Ca    8.9      11 Jul 2017 07:10      BC/ pending

## 2017-07-11 NOTE — PROGRESS NOTE ADULT - ASSESSMENT
81F with ureteral stent now s/p NT exchange with new fever.  Rule out UTI.  suggest - start empiric zosyn  f/u all cultures

## 2017-07-11 NOTE — PROGRESS NOTE ADULT - PROBLEM SELECTOR PLAN 2
resolved   - will continue empiric Flagyl   -Ct A/p noted , Marked improvement in hydronephrosis on the right.   However, ill-defined low density collection in the right posterior   pararenal space has progressed in the interim.   urology consult  noted, recommend Nephrostomy tube changed by IR, performed on 7/10.

## 2017-07-11 NOTE — PROGRESS NOTE ADULT - PROBLEM SELECTOR PLAN 1
Pt has fever and leukocytosis  with recent urological procedure, will add on zosyn  d/w Dr. Soto (ID)

## 2017-07-11 NOTE — PROGRESS NOTE ADULT - SUBJECTIVE AND OBJECTIVE BOX
Patient is a 81y old  Female who presents with a chief complaint of Diarrhea (07 Jul 2017 10:50)      SUBJECTIVE / OVERNIGHT EVENTS:  Pt mumbled in speech. clinically unchanged     MEDICATIONS  (STANDING):  metroNIDAZOLE    Tablet 500 milliGRAM(s) Oral every 8 hours  apixaban 2.5 milliGRAM(s) Oral two times a day  sertraline 50 milliGRAM(s) Oral daily  atorvastatin 10 milliGRAM(s) Oral at bedtime  ferrous    sulfate 325 milliGRAM(s) Oral daily  levothyroxine 75 MICROGram(s) Oral daily  insulin lispro (HumaLOG) corrective regimen sliding scale   SubCutaneous three times a day before meals  insulin lispro (HumaLOG) corrective regimen sliding scale   SubCutaneous at bedtime  dextrose 5%. 1000 milliLiter(s) (50 mL/Hr) IV Continuous <Continuous>  dextrose 50% Injectable 12.5 Gram(s) IV Push once  dextrose 50% Injectable 25 Gram(s) IV Push once  dextrose 50% Injectable 25 Gram(s) IV Push once  sodium chloride 0.9%. 1000 milliLiter(s) (50 mL/Hr) IV Continuous <Continuous>  valproic  acid Syrup 250 milliGRAM(s) Oral three times a day  piperacillin/tazobactam IVPB. 3.375 Gram(s) IV Intermittent every 8 hours    MEDICATIONS  (PRN):  dextrose Gel 1 Dose(s) Oral once PRN Blood Glucose LESS THAN 70 milliGRAM(s)/deciliter  glucagon  Injectable 1 milliGRAM(s) IntraMuscular once PRN Glucose LESS THAN 70 milligrams/deciliter  QUEtiapine 25 milliGRAM(s) Oral every 4 hours PRN For agitation  artificial  tears Solution 1 Drop(s) Both EYES four times a day PRN Dry Eyes  polyethylene glycol 3350 17 Gram(s) Oral daily PRN Constipation  acetaminophen    Suspension. 650 milliGRAM(s) Oral every 6 hours PRN Mild Pain (1 - 3) and moderate pain  bisacodyl Suppository 10 milliGRAM(s) Rectal daily PRN Constipation      Vital Signs Last 24 Hrs  T(C): 36.8 (07-11-17 @ 12:01), Max: 38.5 (07-10-17 @ 19:41)  HR: 69 (07-11-17 @ 12:01) (69 - 85)  BP: 103/51 (07-11-17 @ 12:01) (103/51 - 141/71)  RR: 17 (07-11-17 @ 12:01) (16 - 19)  SpO2: 95% (07-11-17 @ 12:01) (95% - 97%)  CAPILLARY BLOOD GLUCOSE  292 (11 Jul 2017 12:01)  215 (11 Jul 2017 08:20)  263 (10 Jul 2017 21:53)  151 (10 Jul 2017 17:11)        I&O's Summary    10 Jul 2017 07:01  -  11 Jul 2017 07:00  --------------------------------------------------------  IN: 0 mL / OUT: 800 mL / NET: -800 mL    11 Jul 2017 07:01  -  11 Jul 2017 15:11  --------------------------------------------------------  IN: 0 mL / OUT: 150 mL / NET: -150 mL        PHYSICAL EXAM:  GENERAL: NAD  HEAD:  Atraumatic, Normocephalic  EYES: EOMI, PERRLA  NECK: Supple, No JVD  CHEST/LUNG: non-labored in breathing   HEART: s1 s2  ABDOMEN: Soft, Bowel sounds present  EXTREMITIES:  No clubbing, cyanosis, or edema  PSYCH: awake, alert  NEUROLOGY: non-focal  SKIN: No rashes or lesions    LABS:                        7.9    19.11 )-----------( 812      ( 11 Jul 2017 07:10 )             24.3     07-11    139  |  101  |  12  ----------------------------<  205<H>  3.5   |  28  |  0.39<L>    Ca    8.9      11 Jul 2017 07:10                RADIOLOGY & ADDITIONAL TESTS:    Imaging Personally Reviewed:    Consultant(s) Notes Reviewed:      Care Discussed with Consultants/Other Providers:

## 2017-07-12 DIAGNOSIS — E87.6 HYPOKALEMIA: ICD-10-CM

## 2017-07-12 LAB
BUN SERPL-MCNC: 12 MG/DL — SIGNIFICANT CHANGE UP (ref 7–23)
CALCIUM SERPL-MCNC: 8.9 MG/DL — SIGNIFICANT CHANGE UP (ref 8.4–10.5)
CHLORIDE SERPL-SCNC: 100 MMOL/L — SIGNIFICANT CHANGE UP (ref 98–107)
CO2 SERPL-SCNC: 26 MMOL/L — SIGNIFICANT CHANGE UP (ref 22–31)
CREAT SERPL-MCNC: 0.43 MG/DL — LOW (ref 0.5–1.3)
GLUCOSE SERPL-MCNC: 203 MG/DL — HIGH (ref 70–99)
HCT VFR BLD CALC: 22.8 % — LOW (ref 34.5–45)
HGB BLD-MCNC: 7.5 G/DL — LOW (ref 11.5–15.5)
MCHC RBC-ENTMCNC: 27.8 PG — SIGNIFICANT CHANGE UP (ref 27–34)
MCHC RBC-ENTMCNC: 32.9 % — SIGNIFICANT CHANGE UP (ref 32–36)
MCV RBC AUTO: 84.4 FL — SIGNIFICANT CHANGE UP (ref 80–100)
NRBC # FLD: 0 — SIGNIFICANT CHANGE UP
PLATELET # BLD AUTO: 816 K/UL — HIGH (ref 150–400)
PMV BLD: 10.3 FL — SIGNIFICANT CHANGE UP (ref 7–13)
POTASSIUM SERPL-MCNC: 3 MMOL/L — LOW (ref 3.5–5.3)
POTASSIUM SERPL-SCNC: 3 MMOL/L — LOW (ref 3.5–5.3)
RBC # BLD: 2.7 M/UL — LOW (ref 3.8–5.2)
RBC # FLD: 16.8 % — HIGH (ref 10.3–14.5)
SODIUM SERPL-SCNC: 137 MMOL/L — SIGNIFICANT CHANGE UP (ref 135–145)
SPECIMEN SOURCE: SIGNIFICANT CHANGE UP
WBC # BLD: 19.77 K/UL — HIGH (ref 3.8–10.5)
WBC # FLD AUTO: 19.77 K/UL — HIGH (ref 3.8–10.5)

## 2017-07-12 PROCEDURE — 99232 SBSQ HOSP IP/OBS MODERATE 35: CPT

## 2017-07-12 PROCEDURE — 99233 SBSQ HOSP IP/OBS HIGH 50: CPT

## 2017-07-12 RX ORDER — SODIUM CHLORIDE 9 MG/ML
250 INJECTION INTRAMUSCULAR; INTRAVENOUS; SUBCUTANEOUS ONCE
Qty: 0 | Refills: 0 | Status: COMPLETED | OUTPATIENT
Start: 2017-07-12 | End: 2017-07-12

## 2017-07-12 RX ORDER — POTASSIUM CHLORIDE 20 MEQ
20 PACKET (EA) ORAL
Qty: 0 | Refills: 0 | Status: COMPLETED | OUTPATIENT
Start: 2017-07-12 | End: 2017-07-12

## 2017-07-12 RX ADMIN — Medication 325 MILLIGRAM(S): at 12:16

## 2017-07-12 RX ADMIN — Medication 250 MILLIGRAM(S): at 13:24

## 2017-07-12 RX ADMIN — Medication 250 MILLIGRAM(S): at 05:59

## 2017-07-12 RX ADMIN — Medication 75 MICROGRAM(S): at 05:59

## 2017-07-12 RX ADMIN — ATORVASTATIN CALCIUM 10 MILLIGRAM(S): 80 TABLET, FILM COATED ORAL at 21:28

## 2017-07-12 RX ADMIN — Medication 500 MILLIGRAM(S): at 13:25

## 2017-07-12 RX ADMIN — SODIUM CHLORIDE 50 MILLILITER(S): 9 INJECTION INTRAMUSCULAR; INTRAVENOUS; SUBCUTANEOUS at 06:00

## 2017-07-12 RX ADMIN — PIPERACILLIN AND TAZOBACTAM 25 GRAM(S): 4; .5 INJECTION, POWDER, LYOPHILIZED, FOR SOLUTION INTRAVENOUS at 05:59

## 2017-07-12 RX ADMIN — APIXABAN 2.5 MILLIGRAM(S): 2.5 TABLET, FILM COATED ORAL at 05:59

## 2017-07-12 RX ADMIN — Medication 650 MILLIGRAM(S): at 18:00

## 2017-07-12 RX ADMIN — Medication 250 MILLIGRAM(S): at 21:28

## 2017-07-12 RX ADMIN — Medication 20 MILLIEQUIVALENT(S): at 19:32

## 2017-07-12 RX ADMIN — PIPERACILLIN AND TAZOBACTAM 25 GRAM(S): 4; .5 INJECTION, POWDER, LYOPHILIZED, FOR SOLUTION INTRAVENOUS at 21:28

## 2017-07-12 RX ADMIN — APIXABAN 2.5 MILLIGRAM(S): 2.5 TABLET, FILM COATED ORAL at 17:08

## 2017-07-12 RX ADMIN — QUETIAPINE FUMARATE 25 MILLIGRAM(S): 200 TABLET, FILM COATED ORAL at 03:44

## 2017-07-12 RX ADMIN — PIPERACILLIN AND TAZOBACTAM 25 GRAM(S): 4; .5 INJECTION, POWDER, LYOPHILIZED, FOR SOLUTION INTRAVENOUS at 13:25

## 2017-07-12 RX ADMIN — SODIUM CHLORIDE 50 MILLILITER(S): 9 INJECTION INTRAMUSCULAR; INTRAVENOUS; SUBCUTANEOUS at 19:33

## 2017-07-12 RX ADMIN — Medication 20 MILLIEQUIVALENT(S): at 17:12

## 2017-07-12 RX ADMIN — Medication 650 MILLIGRAM(S): at 17:09

## 2017-07-12 RX ADMIN — QUETIAPINE FUMARATE 25 MILLIGRAM(S): 200 TABLET, FILM COATED ORAL at 17:09

## 2017-07-12 RX ADMIN — Medication 3: at 12:16

## 2017-07-12 RX ADMIN — Medication 2: at 17:09

## 2017-07-12 RX ADMIN — Medication 2: at 08:36

## 2017-07-12 RX ADMIN — SODIUM CHLORIDE 250 MILLILITER(S): 9 INJECTION INTRAMUSCULAR; INTRAVENOUS; SUBCUTANEOUS at 18:45

## 2017-07-12 RX ADMIN — Medication 500 MILLIGRAM(S): at 05:59

## 2017-07-12 RX ADMIN — SERTRALINE 50 MILLIGRAM(S): 25 TABLET, FILM COATED ORAL at 12:16

## 2017-07-12 NOTE — PROGRESS NOTE ADULT - PROBLEM SELECTOR PLAN 1
-Planning for stent removal with IR and instertion of nephroureteral stent  -Bowel regimen  -Trend WBC  -Patient has multiple medical comorbidities, and is not a good surgical candidate   -Follow up goals of care

## 2017-07-12 NOTE — PROGRESS NOTE ADULT - SUBJECTIVE AND OBJECTIVE BOX
Patient is a 81y old  Female who presents with a chief complaint of Diarrhea (07 Jul 2017 10:50)      SUBJECTIVE / OVERNIGHT EVENTS:  Pt mumbled in speech     MEDICATIONS  (STANDING):  metroNIDAZOLE    Tablet 500 milliGRAM(s) Oral every 8 hours  apixaban 2.5 milliGRAM(s) Oral two times a day  sertraline 50 milliGRAM(s) Oral daily  atorvastatin 10 milliGRAM(s) Oral at bedtime  ferrous    sulfate 325 milliGRAM(s) Oral daily  levothyroxine 75 MICROGram(s) Oral daily  insulin lispro (HumaLOG) corrective regimen sliding scale   SubCutaneous three times a day before meals  insulin lispro (HumaLOG) corrective regimen sliding scale   SubCutaneous at bedtime  dextrose 5%. 1000 milliLiter(s) (50 mL/Hr) IV Continuous <Continuous>  dextrose 50% Injectable 12.5 Gram(s) IV Push once  dextrose 50% Injectable 25 Gram(s) IV Push once  dextrose 50% Injectable 25 Gram(s) IV Push once  sodium chloride 0.9%. 1000 milliLiter(s) (50 mL/Hr) IV Continuous <Continuous>  valproic  acid Syrup 250 milliGRAM(s) Oral three times a day  piperacillin/tazobactam IVPB. 3.375 Gram(s) IV Intermittent every 8 hours  potassium chloride    Tablet ER 20 milliEquivalent(s) Oral every 2 hours    MEDICATIONS  (PRN):  dextrose Gel 1 Dose(s) Oral once PRN Blood Glucose LESS THAN 70 milliGRAM(s)/deciliter  glucagon  Injectable 1 milliGRAM(s) IntraMuscular once PRN Glucose LESS THAN 70 milligrams/deciliter  QUEtiapine 25 milliGRAM(s) Oral every 4 hours PRN For agitation  artificial  tears Solution 1 Drop(s) Both EYES four times a day PRN Dry Eyes  polyethylene glycol 3350 17 Gram(s) Oral daily PRN Constipation  acetaminophen    Suspension. 650 milliGRAM(s) Oral every 6 hours PRN Mild Pain (1 - 3) and moderate pain  bisacodyl Suppository 10 milliGRAM(s) Rectal daily PRN Constipation      Vital Signs Last 24 Hrs  T(C): 36.4 (07-12-17 @ 12:13), Max: 36.7 (07-11-17 @ 21:50)  HR: 83 (07-12-17 @ 12:13) (82 - 86)  BP: 109/66 (07-12-17 @ 12:13) (109/66 - 133/71)  RR: 18 (07-12-17 @ 12:13) (18 - 18)  SpO2: 97% (07-12-17 @ 12:13) (97% - 98%)  CAPILLARY BLOOD GLUCOSE  214 (12 Jul 2017 16:42)  252 (12 Jul 2017 11:30)  225 (12 Jul 2017 08:31)  189 (11 Jul 2017 23:00)  220 (11 Jul 2017 17:50)        I&O's Summary    11 Jul 2017 07:01  -  12 Jul 2017 07:00  --------------------------------------------------------  IN: 0 mL / OUT: 1270 mL / NET: -1270 mL    12 Jul 2017 07:01  -  12 Jul 2017 17:07  --------------------------------------------------------  IN: 0 mL / OUT: 120 mL / NET: -120 mL        PHYSICAL EXAM:  GENERAL: NAD  HEAD:  Atraumatic, Normocephalic  EYES: EOMI, PERRLA  NECK: Supple, No JVD  CHEST/LUNG: Clear to auscultation bilaterally; No wheeze  HEART: Regular rate and rhythm; No murmurs, rubs, or gallops  ABDOMEN: Soft, Nontender,Bowel sounds present  EXTREMITIES:  2+ Peripheral Pulses, No clubbing, cyanosis, or edema  PSYCH: awake, alert   NEUROLOGY: non-focal  SKIN: No rashes or lesions    LABS:                        7.5    19.77 )-----------( 816      ( 12 Jul 2017 07:30 )             22.8     07-12    137  |  100  |  12  ----------------------------<  203<H>  3.0<L>   |  26  |  0.43<L>    Ca    8.9      12 Jul 2017 07:30                RADIOLOGY & ADDITIONAL TESTS:    Imaging Personally Reviewed:    Consultant(s) Notes Reviewed:      Care Discussed with Consultants/Other Providers:

## 2017-07-12 NOTE — PROGRESS NOTE ADULT - PROBLEM SELECTOR PLAN 2
resolved   -Ct A/p noted , Marked improvement in hydronephrosis on the right.   However, ill-defined low density collection in the right posterior   pararenal space has progressed in the interim.   urology consult  noted, Nephrostomy tube changed by IR on 7/10. resolved. s/p flagyl x 10 d  -Ct A/p noted , Marked improvement in hydronephrosis on the right.   However, ill-defined low density collection in the right posterior   pararenal space has progressed in the interim.   urology consult  noted, Nephrostomy tube changed by IR on 7/10.

## 2017-07-12 NOTE — PROGRESS NOTE ADULT - ASSESSMENT
81F with ureteral stent now s/p NT exchange with new fever.  Rule out UTI.  suggest - start empiric zosyn  f/u all cultures  ? if leukocytosis is a leukemoid reaction to underlying malignancy    Prognosis is poor.

## 2017-07-12 NOTE — PROGRESS NOTE ADULT - PROBLEM SELECTOR PLAN 1
Pt has fever and leukocytosis  with recent urological procedure,  zosyn is added. Plan for Rt nephroureteral stent change by IR tomorrow  NPO past MN

## 2017-07-12 NOTE — PROGRESS NOTE ADULT - SUBJECTIVE AND OBJECTIVE BOX
Subjective  Patient did well overnight.  Afebrile, leukocytosis stable.  Family at bedside anxious about stent plan.     T(F): , Max: 98.1 (07-11-17 @ 21:50)  HR: 83 (07-12-17 @ 12:13)  BP: 109/66 (07-12-17 @ 12:13)  SpO2: 97% (07-12-17 @ 12:13)    R NT 1120 clear yellow    Gen NAD, resting  Abd soft, NT, ND   R NT in place    Labs  07-12 @ 07:30  WBC 19.77 / Hct 22.8  / SCr 0.43     07-11 @ 07:10  WBC 19.11 / Hct 24.3  / SCr 0.39

## 2017-07-12 NOTE — CHART NOTE - NSCHARTNOTEFT_GEN_A_CORE
Called into patient room by RN, patient clammy and sleepy.    Fingerstick 236,   Vital Signs Last 24 Hrs  T(C): 37.8 (12 Jul 2017 18:22), Max: 37.8 (12 Jul 2017 18:22)  T(F): 100.1 (12 Jul 2017 18:22), Max: 100.1 (12 Jul 2017 18:22)  HR: 80 (12 Jul 2017 18:22) (80 - 86)  BP: 94/49 (12 Jul 2017 18:22) (94/49 - 133/71)  BP(mean): --  RR: 18 (12 Jul 2017 12:13) (18 - 18)  SpO2: 97% (12 Jul 2017 12:13) (97% - 98%)    As per RN patient received tylenol approximately 1 hour ago.  Rectal temp done 100.1F at this time 1 hour after tylenol dose.    Normal Saline 250cc IV x 1 bolus     Dr Uribe aware, recommendation for re-blood culture.

## 2017-07-12 NOTE — PROGRESS NOTE ADULT - SUBJECTIVE AND OBJECTIVE BOX
Follow Up:      Inverval History/ROS:Patient is a 81y old  Female s/p change pcn, awaiting stent change    Fever justo ther day- now on zosyn      Allergies    adhesives (Rash)  No Known Drug Allergies    Intolerances        ANTIMICROBIALS:  piperacillin/tazobactam IVPB. 3.375 every 8 hours      OTHER MEDS:  apixaban 2.5 milliGRAM(s) Oral two times a day  sertraline 50 milliGRAM(s) Oral daily  atorvastatin 10 milliGRAM(s) Oral at bedtime  ferrous    sulfate 325 milliGRAM(s) Oral daily  levothyroxine 75 MICROGram(s) Oral daily  insulin lispro (HumaLOG) corrective regimen sliding scale   SubCutaneous three times a day before meals  insulin lispro (HumaLOG) corrective regimen sliding scale   SubCutaneous at bedtime  dextrose 5%. 1000 milliLiter(s) IV Continuous <Continuous>  dextrose Gel 1 Dose(s) Oral once PRN  dextrose 50% Injectable 12.5 Gram(s) IV Push once  dextrose 50% Injectable 25 Gram(s) IV Push once  dextrose 50% Injectable 25 Gram(s) IV Push once  glucagon  Injectable 1 milliGRAM(s) IntraMuscular once PRN  QUEtiapine 25 milliGRAM(s) Oral every 4 hours PRN  artificial  tears Solution 1 Drop(s) Both EYES four times a day PRN  polyethylene glycol 3350 17 Gram(s) Oral daily PRN  sodium chloride 0.9%. 1000 milliLiter(s) IV Continuous <Continuous>  acetaminophen    Suspension. 650 milliGRAM(s) Oral every 6 hours PRN  bisacodyl Suppository 10 milliGRAM(s) Rectal daily PRN  valproic  acid Syrup 250 milliGRAM(s) Oral three times a day  potassium chloride    Tablet ER 20 milliEquivalent(s) Oral every 2 hours      Vital Signs Last 24 Hrs  T(C): 36.4 (12 Jul 2017 12:13), Max: 36.7 (11 Jul 2017 21:50)  T(F): 97.5 (12 Jul 2017 12:13), Max: 98.1 (11 Jul 2017 21:50)  HR: 83 (12 Jul 2017 12:13) (82 - 86)  BP: 109/66 (12 Jul 2017 12:13) (109/66 - 133/71)  BP(mean): --  RR: 18 (12 Jul 2017 12:13) (18 - 18)  SpO2: 97% (12 Jul 2017 12:13) (97% - 98%)    PHYSICAL EXAM:  General: x[ ] non-toxic  HEAD/EYES: [ ] PERRL [x ] white sclera [ ] icterus  ENT:  [ ] normal [ ] supple [ ] thrush [ ] pharyngeal exudate  Cardiovascular:   [ ] murmur [ x] normal [ ] PPM/AICD  Respiratory:  [x ] clear to ausculation bilaterally  GI:  [x ] soft, non-tender, normal bowel sounds  :  [ ] sanchez [ ] no CVA tenderness   Musculoskeletal:  [ ] no synovitis  Neurologic:  [ ] non-focal exam   Skin:  [x ] no rash  Lymph: [ ] no lymphadenopathy  Psychiatric:  [ ] appropriate affect [ ] alert & oriented  Lines:  [ ] no phlebitis [ ] central line                                7.5    19.77 )-----------( 816      ( 12 Jul 2017 07:30 )             22.8       07-12    137  |  100  |  12  ----------------------------<  203<H>  3.0<L>   |  26  |  0.43<L>    Ca    8.9      12 Jul 2017 07:30            MICROBIOLOGY:    RADIOLOGY:

## 2017-07-13 LAB
-  AMIKACIN: SIGNIFICANT CHANGE UP
-  AZTREONAM: SIGNIFICANT CHANGE UP
-  CEFEPIME: SIGNIFICANT CHANGE UP
-  CEFTAZIDIME: SIGNIFICANT CHANGE UP
-  CIPROFLOXACIN: SIGNIFICANT CHANGE UP
-  GENTAMICIN: SIGNIFICANT CHANGE UP
-  IMIPENEM: SIGNIFICANT CHANGE UP
-  LEVOFLOXACIN: SIGNIFICANT CHANGE UP
-  MEROPENEM: SIGNIFICANT CHANGE UP
-  PIPERACILLIN/TAZOBACTAM: SIGNIFICANT CHANGE UP
-  TOBRAMYCIN: SIGNIFICANT CHANGE UP
APTT BLD: 31.9 SEC — SIGNIFICANT CHANGE UP (ref 27.5–37.4)
BACTERIA UR CULT: SIGNIFICANT CHANGE UP
BUN SERPL-MCNC: 11 MG/DL — SIGNIFICANT CHANGE UP (ref 7–23)
CALCIUM SERPL-MCNC: 8.7 MG/DL — SIGNIFICANT CHANGE UP (ref 8.4–10.5)
CHLORIDE SERPL-SCNC: 98 MMOL/L — SIGNIFICANT CHANGE UP (ref 98–107)
CO2 SERPL-SCNC: 24 MMOL/L — SIGNIFICANT CHANGE UP (ref 22–31)
CREAT SERPL-MCNC: 0.41 MG/DL — LOW (ref 0.5–1.3)
GLUCOSE SERPL-MCNC: 185 MG/DL — HIGH (ref 70–99)
HCT VFR BLD CALC: 23.2 % — LOW (ref 34.5–45)
HGB BLD-MCNC: 7.3 G/DL — LOW (ref 11.5–15.5)
INR BLD: 1.33 — HIGH (ref 0.88–1.17)
MCHC RBC-ENTMCNC: 26.9 PG — LOW (ref 27–34)
MCHC RBC-ENTMCNC: 31.5 % — LOW (ref 32–36)
MCV RBC AUTO: 85.6 FL — SIGNIFICANT CHANGE UP (ref 80–100)
METHOD TYPE: SIGNIFICANT CHANGE UP
NRBC # FLD: 0 — SIGNIFICANT CHANGE UP
ORGANISM # SPEC MICROSCOPIC CNT: SIGNIFICANT CHANGE UP
ORGANISM # SPEC MICROSCOPIC CNT: SIGNIFICANT CHANGE UP
PLATELET # BLD AUTO: 836 K/UL — HIGH (ref 150–400)
PMV BLD: 10.8 FL — SIGNIFICANT CHANGE UP (ref 7–13)
POTASSIUM SERPL-MCNC: 3.5 MMOL/L — SIGNIFICANT CHANGE UP (ref 3.5–5.3)
POTASSIUM SERPL-SCNC: 3.5 MMOL/L — SIGNIFICANT CHANGE UP (ref 3.5–5.3)
PROTHROM AB SERPL-ACNC: 15 SEC — HIGH (ref 9.8–13.1)
RBC # BLD: 2.71 M/UL — LOW (ref 3.8–5.2)
RBC # FLD: 17.3 % — HIGH (ref 10.3–14.5)
SODIUM SERPL-SCNC: 137 MMOL/L — SIGNIFICANT CHANGE UP (ref 135–145)
SPECIMEN SOURCE: SIGNIFICANT CHANGE UP
SPECIMEN SOURCE: SIGNIFICANT CHANGE UP
WBC # BLD: 22.3 K/UL — HIGH (ref 3.8–10.5)
WBC # FLD AUTO: 22.3 K/UL — HIGH (ref 3.8–10.5)

## 2017-07-13 PROCEDURE — 99233 SBSQ HOSP IP/OBS HIGH 50: CPT

## 2017-07-13 PROCEDURE — 99232 SBSQ HOSP IP/OBS MODERATE 35: CPT | Mod: GC

## 2017-07-13 RX ADMIN — APIXABAN 2.5 MILLIGRAM(S): 2.5 TABLET, FILM COATED ORAL at 22:39

## 2017-07-13 RX ADMIN — Medication 650 MILLIGRAM(S): at 13:37

## 2017-07-13 RX ADMIN — Medication 325 MILLIGRAM(S): at 13:08

## 2017-07-13 RX ADMIN — SERTRALINE 50 MILLIGRAM(S): 25 TABLET, FILM COATED ORAL at 13:08

## 2017-07-13 RX ADMIN — PIPERACILLIN AND TAZOBACTAM 25 GRAM(S): 4; .5 INJECTION, POWDER, LYOPHILIZED, FOR SOLUTION INTRAVENOUS at 06:07

## 2017-07-13 RX ADMIN — Medication 2: at 08:42

## 2017-07-13 RX ADMIN — PIPERACILLIN AND TAZOBACTAM 25 GRAM(S): 4; .5 INJECTION, POWDER, LYOPHILIZED, FOR SOLUTION INTRAVENOUS at 23:13

## 2017-07-13 RX ADMIN — Medication 650 MILLIGRAM(S): at 13:07

## 2017-07-13 RX ADMIN — Medication 250 MILLIGRAM(S): at 13:09

## 2017-07-13 RX ADMIN — APIXABAN 2.5 MILLIGRAM(S): 2.5 TABLET, FILM COATED ORAL at 13:08

## 2017-07-13 RX ADMIN — Medication 75 MICROGRAM(S): at 06:08

## 2017-07-13 RX ADMIN — QUETIAPINE FUMARATE 25 MILLIGRAM(S): 200 TABLET, FILM COATED ORAL at 01:56

## 2017-07-13 RX ADMIN — PIPERACILLIN AND TAZOBACTAM 25 GRAM(S): 4; .5 INJECTION, POWDER, LYOPHILIZED, FOR SOLUTION INTRAVENOUS at 13:13

## 2017-07-13 RX ADMIN — Medication 250 MILLIGRAM(S): at 22:39

## 2017-07-13 RX ADMIN — Medication 250 MILLIGRAM(S): at 06:08

## 2017-07-13 RX ADMIN — QUETIAPINE FUMARATE 25 MILLIGRAM(S): 200 TABLET, FILM COATED ORAL at 22:39

## 2017-07-13 RX ADMIN — ATORVASTATIN CALCIUM 10 MILLIGRAM(S): 80 TABLET, FILM COATED ORAL at 22:39

## 2017-07-13 RX ADMIN — Medication 1: at 13:08

## 2017-07-13 RX ADMIN — Medication 1: at 17:07

## 2017-07-13 NOTE — PROGRESS NOTE ADULT - ASSESSMENT
81F with ureteral stent now s/p NT exchange with new fever.     She has a pseudomonal UTI. Continue Zosyn. Continue pseudomonal coverage though 7/17- if she were to be stable for dc could cahnge to cipro.    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 I think this may be contributing to leukocytosis/ ? leukamoid interaction,    Given her underlying demenita, her overall prognosis is poor. Continue goals of care discussions.    Call with questions- will follow intermittently Doubt antibiotics will significantly change course.       Prognosis is poor.

## 2017-07-13 NOTE — PROGRESS NOTE ADULT - SUBJECTIVE AND OBJECTIVE BOX
Patient is a 81y old  Female who presents with a chief complaint of Diarrhea (07 Jul 2017 10:50)      SUBJECTIVE / OVERNIGHT EVENTS:    MEDICATIONS  (STANDING):  apixaban 2.5 milliGRAM(s) Oral two times a day  sertraline 50 milliGRAM(s) Oral daily  atorvastatin 10 milliGRAM(s) Oral at bedtime  ferrous    sulfate 325 milliGRAM(s) Oral daily  levothyroxine 75 MICROGram(s) Oral daily  insulin lispro (HumaLOG) corrective regimen sliding scale   SubCutaneous three times a day before meals  insulin lispro (HumaLOG) corrective regimen sliding scale   SubCutaneous at bedtime  dextrose 5%. 1000 milliLiter(s) (50 mL/Hr) IV Continuous <Continuous>  dextrose 50% Injectable 12.5 Gram(s) IV Push once  dextrose 50% Injectable 25 Gram(s) IV Push once  dextrose 50% Injectable 25 Gram(s) IV Push once  sodium chloride 0.9%. 1000 milliLiter(s) (50 mL/Hr) IV Continuous <Continuous>  valproic  acid Syrup 250 milliGRAM(s) Oral three times a day  piperacillin/tazobactam IVPB. 3.375 Gram(s) IV Intermittent every 8 hours    MEDICATIONS  (PRN):  dextrose Gel 1 Dose(s) Oral once PRN Blood Glucose LESS THAN 70 milliGRAM(s)/deciliter  glucagon  Injectable 1 milliGRAM(s) IntraMuscular once PRN Glucose LESS THAN 70 milligrams/deciliter  QUEtiapine 25 milliGRAM(s) Oral every 4 hours PRN For agitation  artificial  tears Solution 1 Drop(s) Both EYES four times a day PRN Dry Eyes  polyethylene glycol 3350 17 Gram(s) Oral daily PRN Constipation  acetaminophen    Suspension. 650 milliGRAM(s) Oral every 6 hours PRN Mild Pain (1 - 3) and moderate pain  bisacodyl Suppository 10 milliGRAM(s) Rectal daily PRN Constipation  artificial tears (preservative free) Ophthalmic Solution 1 Drop(s) Both EYES every 4 hours PRN Dry Eyes      Vital Signs Last 24 Hrs  T(C): 36.6 (07-13-17 @ 12:24), Max: 37.8 (07-12-17 @ 18:22)  HR: 77 (07-13-17 @ 12:24) (56 - 80)  BP: 115/68 (07-13-17 @ 12:24) (94/49 - 116/67)  RR: 17 (07-13-17 @ 12:24) (16 - 17)  SpO2: 97% (07-13-17 @ 12:24) (96% - 97%)  CAPILLARY BLOOD GLUCOSE  172 (13 Jul 2017 16:41)  180 (13 Jul 2017 12:16)  242 (13 Jul 2017 08:36)  161 (12 Jul 2017 21:27)  236 (12 Jul 2017 18:22)        I&O's Summary    12 Jul 2017 07:01  -  13 Jul 2017 07:00  --------------------------------------------------------  IN: 0 mL / OUT: 120 mL / NET: -120 mL    13 Jul 2017 07:01  -  13 Jul 2017 16:47  --------------------------------------------------------  IN: 0 mL / OUT: 400 mL / NET: -400 mL        PHYSICAL EXAM:  GENERAL: NAD, well-developed  HEAD:  Atraumatic, Normocephalic  EYES: EOMI, PERRLA, conjunctiva and sclera clear  NECK: Supple, No JVD  CHEST/LUNG: Clear to auscultation bilaterally; No wheeze  HEART: Regular rate and rhythm; No murmurs, rubs, or gallops  ABDOMEN: Soft, Nontender, Nondistended; Bowel sounds present  EXTREMITIES:  2+ Peripheral Pulses, No clubbing, cyanosis, or edema  PSYCH: AAOx3  NEUROLOGY: non-focal  SKIN: No rashes or lesions    LABS:                        7.3    22.30 )-----------( 836      ( 13 Jul 2017 06:05 )             23.2     07-13    137  |  98  |  11  ----------------------------<  185<H>  3.5   |  24  |  0.41<L>    Ca    8.7      13 Jul 2017 06:05      PT/INR - ( 13 Jul 2017 06:05 )   PT: 15.0 SEC;   INR: 1.33          PTT - ( 13 Jul 2017 06:05 )  PTT:31.9 SEC          RADIOLOGY & ADDITIONAL TESTS:    Imaging Personally Reviewed:    Consultant(s) Notes Reviewed:      Care Discussed with Consultants/Other Providers: Patient is a 81y old  Female who presents with a chief complaint of Diarrhea (07 Jul 2017 10:50)      SUBJECTIVE / OVERNIGHT EVENTS: patient seen and examined by bedside, no acute distress noted        MEDICATIONS  (STANDING):  apixaban 2.5 milliGRAM(s) Oral two times a day  sertraline 50 milliGRAM(s) Oral daily  atorvastatin 10 milliGRAM(s) Oral at bedtime  ferrous    sulfate 325 milliGRAM(s) Oral daily  levothyroxine 75 MICROGram(s) Oral daily  insulin lispro (HumaLOG) corrective regimen sliding scale   SubCutaneous three times a day before meals  insulin lispro (HumaLOG) corrective regimen sliding scale   SubCutaneous at bedtime  dextrose 5%. 1000 milliLiter(s) (50 mL/Hr) IV Continuous <Continuous>  dextrose 50% Injectable 12.5 Gram(s) IV Push once  dextrose 50% Injectable 25 Gram(s) IV Push once  dextrose 50% Injectable 25 Gram(s) IV Push once  sodium chloride 0.9%. 1000 milliLiter(s) (50 mL/Hr) IV Continuous <Continuous>  valproic  acid Syrup 250 milliGRAM(s) Oral three times a day  piperacillin/tazobactam IVPB. 3.375 Gram(s) IV Intermittent every 8 hours    MEDICATIONS  (PRN):  dextrose Gel 1 Dose(s) Oral once PRN Blood Glucose LESS THAN 70 milliGRAM(s)/deciliter  glucagon  Injectable 1 milliGRAM(s) IntraMuscular once PRN Glucose LESS THAN 70 milligrams/deciliter  QUEtiapine 25 milliGRAM(s) Oral every 4 hours PRN For agitation  artificial  tears Solution 1 Drop(s) Both EYES four times a day PRN Dry Eyes  polyethylene glycol 3350 17 Gram(s) Oral daily PRN Constipation  acetaminophen    Suspension. 650 milliGRAM(s) Oral every 6 hours PRN Mild Pain (1 - 3) and moderate pain  bisacodyl Suppository 10 milliGRAM(s) Rectal daily PRN Constipation  artificial tears (preservative free) Ophthalmic Solution 1 Drop(s) Both EYES every 4 hours PRN Dry Eyes      Vital Signs Last 24 Hrs  T(C): 36.6 (07-13-17 @ 12:24), Max: 37.8 (07-12-17 @ 18:22)  HR: 77 (07-13-17 @ 12:24) (56 - 80)  BP: 115/68 (07-13-17 @ 12:24) (94/49 - 116/67)  RR: 17 (07-13-17 @ 12:24) (16 - 17)  SpO2: 97% (07-13-17 @ 12:24) (96% - 97%)  CAPILLARY BLOOD GLUCOSE  172 (13 Jul 2017 16:41)  180 (13 Jul 2017 12:16)  242 (13 Jul 2017 08:36)  161 (12 Jul 2017 21:27)  236 (12 Jul 2017 18:22)        I&O's Summary    12 Jul 2017 07:01  -  13 Jul 2017 07:00  --------------------------------------------------------  IN: 0 mL / OUT: 120 mL / NET: -120 mL    13 Jul 2017 07:01  -  13 Jul 2017 16:47  --------------------------------------------------------  IN: 0 mL / OUT: 400 mL / NET: -400 mL        PHYSICAL EXAM:  GENERAL: NAD,   HEAD:  Atraumatic, Normocephalic  EYES: EOMI, PERRLA, conjunctiva and sclera clear  NECK: Supple, No JVD  CHEST/LUNG: Clear to auscultation bilaterally; No wheeze  HEART: Regular rate and rhythm; No murmurs, rubs, or gallops  ABDOMEN: Soft, Nontender, Nondistended; Bowel sounds present  : Rt nephrostomy tube in place   EXTREMITIES:  , No clubbing, cyanosis, or edema  PSYCH: awake, minimally verbal   NEUROLOGY: non-focal  SKIN: No rashes or lesions    LABS:                        7.3    22.30 )-----------( 836      ( 13 Jul 2017 06:05 )             23.2     07-13    137  |  98  |  11  ----------------------------<  185<H>  3.5   |  24  |  0.41<L>    Ca    8.7      13 Jul 2017 06:05      PT/INR - ( 13 Jul 2017 06:05 )   PT: 15.0 SEC;   INR: 1.33          PTT - ( 13 Jul 2017 06:05 )  PTT:31.9 SEC      Culture - Urine (07.11.17 @ 00:55)    -  Amikacin: S <=16 CINDY    -  Ceftazidime: S <=1 CINDY    -  Cefepime: S <=4 CINDY    -  Ciprofloxacin: S <=1 CINDY    -  Gentamicin: S <=4 CINDY    Culture - Urine:   COLONY COUNT: 10,000-49,000 CFU/mL    -  Aztreonam: S <=4 CINDY    -  Piperacillin/Tazobactam: S <=16 CINDY    -  Imipenem: S <=1 CINDY    -  Levofloxacin: S <=2 CINDY    -  Meropenem: S <=1 CINDY    -  Tobramycin: S <=4 CINDY    Specimen Source: URINE MIDSTREAM    Organism Identification: Pseudomonas aeruginosa    Organism: Pseudomonas aeruginosa    Method Type: MICROSCAN NEG URINE COMBO 61        RADIOLOGY & ADDITIONAL TESTS:    Imaging Personally Reviewed:    Consultant(s) Notes Reviewed:  ID,     Care Discussed with Consultants/Other Providers:ID

## 2017-07-13 NOTE — PROGRESS NOTE ADULT - SUBJECTIVE AND OBJECTIVE BOX
Follow Up:      Inverval History/ROS:Patient is a 81y old  Female who presents with leukocytosis  She has low grade temp overnight.        Allergies    adhesives (Rash)  No Known Drug Allergies    Intolerances        ANTIMICROBIALS:  piperacillin/tazobactam IVPB. 3.375 every 8 hours      OTHER MEDS:  apixaban 2.5 milliGRAM(s) Oral two times a day  sertraline 50 milliGRAM(s) Oral daily  atorvastatin 10 milliGRAM(s) Oral at bedtime  ferrous    sulfate 325 milliGRAM(s) Oral daily  levothyroxine 75 MICROGram(s) Oral daily  insulin lispro (HumaLOG) corrective regimen sliding scale   SubCutaneous three times a day before meals  insulin lispro (HumaLOG) corrective regimen sliding scale   SubCutaneous at bedtime  dextrose 5%. 1000 milliLiter(s) IV Continuous <Continuous>  dextrose Gel 1 Dose(s) Oral once PRN  dextrose 50% Injectable 12.5 Gram(s) IV Push once  dextrose 50% Injectable 25 Gram(s) IV Push once  dextrose 50% Injectable 25 Gram(s) IV Push once  glucagon  Injectable 1 milliGRAM(s) IntraMuscular once PRN  QUEtiapine 25 milliGRAM(s) Oral every 4 hours PRN  artificial  tears Solution 1 Drop(s) Both EYES four times a day PRN  polyethylene glycol 3350 17 Gram(s) Oral daily PRN  sodium chloride 0.9%. 1000 milliLiter(s) IV Continuous <Continuous>  acetaminophen    Suspension. 650 milliGRAM(s) Oral every 6 hours PRN  bisacodyl Suppository 10 milliGRAM(s) Rectal daily PRN  valproic  acid Syrup 250 milliGRAM(s) Oral three times a day  artificial tears (preservative free) Ophthalmic Solution 1 Drop(s) Both EYES every 4 hours PRN      Vital Signs Last 24 Hrs  T(C): 36.6 (13 Jul 2017 12:24), Max: 37.8 (12 Jul 2017 18:22)  T(F): 97.9 (13 Jul 2017 12:24), Max: 100.1 (12 Jul 2017 18:22)  HR: 77 (13 Jul 2017 12:24) (56 - 80)  BP: 115/68 (13 Jul 2017 12:24) (94/49 - 116/67)  BP(mean): --  RR: 17 (13 Jul 2017 12:24) (16 - 17)  SpO2: 97% (13 Jul 2017 12:24) (96% - 97%)    PHYSICAL EXAM:  General: [ ] non-toxic  HEAD/EYES: [ ] PERRL [ ] white sclera [ ] icterus  ENT:  [ ] normal [ ] supple [ ] thrush [ ] pharyngeal exudate  Cardiovascular:   [ ] murmur [ ] normal [ ] PPM/AICD  Respiratory:  [ ] clear to ausculation bilaterally  GI:  [ ] soft, non-tender, normal bowel sounds  :  [ ] sanchez [ ] no CVA tenderness   Musculoskeletal:  [ ] no synovitis  Neurologic:  [ ] non-focal exam   Skin:  [ ] no rash  Lymph: [ ] no lymphadenopathy  Psychiatric:  [ ] appropriate affect [ ] alert & oriented  Lines:  [ ] no phlebitis [ ] central line                                7.3    22.30 )-----------( 836      ( 13 Jul 2017 06:05 )             23.2       07-13    137  |  98  |  11  ----------------------------<  185<H>  3.5   |  24  |  0.41<L>    Ca    8.7      13 Jul 2017 06:05            MICROBIOLOGY: urine cx: pseudomonas    RADIOLOGY:

## 2017-07-13 NOTE — PROGRESS NOTE ADULT - SUBJECTIVE AND OBJECTIVE BOX
Subjective  Patient scheduled to go to IR this evening for urology bedside stent removal, and IR insertion of nephroureteral stent.  No acute events overnight.    T(F): , Max: 100.1 (07-12-17 @ 18:22)  HR: 77 (07-13-17 @ 12:24)  BP: 115/68 (07-13-17 @ 12:24)  SpO2: 97% (07-13-17 @ 12:24)    R  last shift, 300 so far today    Gen NAD  Abd soft, NT, ND    Labs  07-13 @ 06:05  WBC 22.30 / Hct 23.2  / SCr 0.41     07-12 @ 07:30  WBC 19.77 / Hct 22.8  / SCr 0.43 Subjective  Patient scheduled to go to IR tomorrow for insertion of nephroureteral stent.  No acute events overnight.    T(F): , Max: 100.1 (07-12-17 @ 18:22)  HR: 77 (07-13-17 @ 12:24)  BP: 115/68 (07-13-17 @ 12:24)  SpO2: 97% (07-13-17 @ 12:24)    R  last shift, 300 so far today    Gen NAD  Abd soft, NT, ND    Labs  07-13 @ 06:05  WBC 22.30 / Hct 23.2  / SCr 0.41     07-12 @ 07:30  WBC 19.77 / Hct 22.8  / SCr 0.43

## 2017-07-13 NOTE — PROGRESS NOTE ADULT - ASSESSMENT
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

## 2017-07-13 NOTE — PROGRESS NOTE ADULT - PROBLEM SELECTOR PLAN 1
Pt had fever and leukocytosis  with recent urological procedure,  zosyn was  added empirically, .   Urine cx with Pseudomonas aeruginosa  Plan for Rt nephroureteral stent change by IR tomorrow as procedure was cancelled today by IR   NPO past MN

## 2017-07-13 NOTE — PROVIDER CONTACT NOTE (MEDICATION) - SITUATION
Patient is NPO after midnight for IR procedure today. Asking if I can administer morning medication.

## 2017-07-13 NOTE — PROVIDER CONTACT NOTE (MEDICATION) - RECOMMENDATIONS
OK to give AM meds, reschedule Elquis for after procedure, at 10am as per Emily Lane NP, due to bleeding risk.

## 2017-07-13 NOTE — PROGRESS NOTE ADULT - PROBLEM SELECTOR PLAN 2
resolved. s/p flagyl x 10 d  -Ct A/p noted , Marked improvement in hydronephrosis on the right.   However, ill-defined low density collection in the right posterior   pararenal space has progressed in the interim.   urology consult  noted, Nephrostomy tube changed by IR on 7/10, Plan for Rt nephroureteral stent change by IR tomorrow

## 2017-07-14 LAB
BUN SERPL-MCNC: 10 MG/DL — SIGNIFICANT CHANGE UP (ref 7–23)
CALCIUM SERPL-MCNC: 9.1 MG/DL — SIGNIFICANT CHANGE UP (ref 8.4–10.5)
CHLORIDE SERPL-SCNC: 100 MMOL/L — SIGNIFICANT CHANGE UP (ref 98–107)
CO2 SERPL-SCNC: 28 MMOL/L — SIGNIFICANT CHANGE UP (ref 22–31)
CREAT SERPL-MCNC: 0.41 MG/DL — LOW (ref 0.5–1.3)
GLUCOSE SERPL-MCNC: 200 MG/DL — HIGH (ref 70–99)
HCT VFR BLD CALC: 23.5 % — LOW (ref 34.5–45)
HGB BLD-MCNC: 7.7 G/DL — LOW (ref 11.5–15.5)
MCHC RBC-ENTMCNC: 27.9 PG — SIGNIFICANT CHANGE UP (ref 27–34)
MCHC RBC-ENTMCNC: 32.8 % — SIGNIFICANT CHANGE UP (ref 32–36)
MCV RBC AUTO: 85.1 FL — SIGNIFICANT CHANGE UP (ref 80–100)
NRBC # FLD: 0 — SIGNIFICANT CHANGE UP
PLATELET # BLD AUTO: 707 K/UL — HIGH (ref 150–400)
PMV BLD: 11.3 FL — SIGNIFICANT CHANGE UP (ref 7–13)
POTASSIUM SERPL-MCNC: 3.6 MMOL/L — SIGNIFICANT CHANGE UP (ref 3.5–5.3)
POTASSIUM SERPL-SCNC: 3.6 MMOL/L — SIGNIFICANT CHANGE UP (ref 3.5–5.3)
RBC # BLD: 2.76 M/UL — LOW (ref 3.8–5.2)
RBC # FLD: 17.3 % — HIGH (ref 10.3–14.5)
SODIUM SERPL-SCNC: 138 MMOL/L — SIGNIFICANT CHANGE UP (ref 135–145)
WBC # BLD: 21.87 K/UL — HIGH (ref 3.8–10.5)
WBC # FLD AUTO: 21.87 K/UL — HIGH (ref 3.8–10.5)

## 2017-07-14 PROCEDURE — 99233 SBSQ HOSP IP/OBS HIGH 50: CPT

## 2017-07-14 PROCEDURE — 50430 NJX PX NFROSGRM &/URTRGRM: CPT | Mod: 59,RT

## 2017-07-14 PROCEDURE — 50435 EXCHANGE NEPHROSTOMY CATH: CPT | Mod: RT

## 2017-07-14 RX ADMIN — Medication 325 MILLIGRAM(S): at 13:09

## 2017-07-14 RX ADMIN — PIPERACILLIN AND TAZOBACTAM 25 GRAM(S): 4; .5 INJECTION, POWDER, LYOPHILIZED, FOR SOLUTION INTRAVENOUS at 22:59

## 2017-07-14 RX ADMIN — PIPERACILLIN AND TAZOBACTAM 25 GRAM(S): 4; .5 INJECTION, POWDER, LYOPHILIZED, FOR SOLUTION INTRAVENOUS at 13:35

## 2017-07-14 RX ADMIN — Medication 250 MILLIGRAM(S): at 22:59

## 2017-07-14 RX ADMIN — Medication 650 MILLIGRAM(S): at 23:01

## 2017-07-14 RX ADMIN — Medication 75 MICROGRAM(S): at 06:35

## 2017-07-14 RX ADMIN — QUETIAPINE FUMARATE 25 MILLIGRAM(S): 200 TABLET, FILM COATED ORAL at 13:09

## 2017-07-14 RX ADMIN — Medication 1: at 13:09

## 2017-07-14 RX ADMIN — QUETIAPINE FUMARATE 25 MILLIGRAM(S): 200 TABLET, FILM COATED ORAL at 06:43

## 2017-07-14 RX ADMIN — SERTRALINE 50 MILLIGRAM(S): 25 TABLET, FILM COATED ORAL at 13:09

## 2017-07-14 RX ADMIN — APIXABAN 2.5 MILLIGRAM(S): 2.5 TABLET, FILM COATED ORAL at 06:35

## 2017-07-14 RX ADMIN — PIPERACILLIN AND TAZOBACTAM 25 GRAM(S): 4; .5 INJECTION, POWDER, LYOPHILIZED, FOR SOLUTION INTRAVENOUS at 06:34

## 2017-07-14 RX ADMIN — Medication 250 MILLIGRAM(S): at 13:09

## 2017-07-14 RX ADMIN — ATORVASTATIN CALCIUM 10 MILLIGRAM(S): 80 TABLET, FILM COATED ORAL at 22:59

## 2017-07-14 RX ADMIN — Medication 250 MILLIGRAM(S): at 06:35

## 2017-07-14 RX ADMIN — Medication 650 MILLIGRAM(S): at 23:55

## 2017-07-14 RX ADMIN — Medication 2: at 09:16

## 2017-07-14 RX ADMIN — SODIUM CHLORIDE 50 MILLILITER(S): 9 INJECTION INTRAMUSCULAR; INTRAVENOUS; SUBCUTANEOUS at 06:34

## 2017-07-14 NOTE — PROGRESS NOTE ADULT - PROBLEM SELECTOR PLAN 1
Pt had fever and leukocytosis, fever resolved, persistent leucocytosis   Pt s/p recent urological procedure,  zosyn was  added empirically, .   Urine cx with Pseudomonas aeruginosa  Plan for Rt nephroureteral stent change by IR today

## 2017-07-14 NOTE — PROGRESS NOTE ADULT - SUBJECTIVE AND OBJECTIVE BOX
Subjective  Patient did well overnight, no acute events.  Scheduled for IR removal of stent, NT, with replacement of R nephroureteral stent.    T(F): , Max: 98 (07-14-17 @ 06:29)  HR: 72 (07-14-17 @ 06:29)  BP: 124/66 (07-14-17 @ 06:29)  SpO2: 98% (07-14-17 @ 06:29)        Gen NAD  Abd Soft, NT, ND   NT draining clear yellow urine    Labs  07-14 @ 06:27  WBC 21.87 / Hct 23.5  / SCr 0.41     07-13 @ 06:05  WBC 22.30 / Hct 23.2  / SCr 0.41 Subjective  Patient did well overnight, no acute events.  Scheduled for IR removal of stent, NT, with replacement of R nephroureteral stent.    T(F): , Max: 98 (07-14-17 @ 06:29)  HR: 72 (07-14-17 @ 06:29)  BP: 124/66 (07-14-17 @ 06:29)  SpO2: 98% (07-14-17 @ 06:29)        Labs  07-14 @ 06:27  WBC 21.87 / Hct 23.5  / SCr 0.41     07-13 @ 06:05  WBC 22.30 / Hct 23.2  / SCr 0.41 Subjective  Patient did well overnight, no acute events.  Scheduled for IR removal of stent, NT, with replacement of R nephroureteral stent.    T(F): , Max: 98 (07-14-17 @ 06:29)  HR: 72 (07-14-17 @ 06:29)  BP: 124/66 (07-14-17 @ 06:29)  SpO2: 98% (07-14-17 @ 06:29)        Patient currently in IR getting Nephroureteral stent    Labs  07-14 @ 06:27  WBC 21.87 / Hct 23.5  / SCr 0.41     07-13 @ 06:05  WBC 22.30 / Hct 23.2  / SCr 0.41

## 2017-07-14 NOTE — PROGRESS NOTE ADULT - PROBLEM SELECTOR PLAN 6
- continue Depakote and Zoloft  -pt on PRN Seroquel  for agitation  -functional quadriplegia : supportive care / assistance with ADLs  - Glucerna for dietary supplement   -skin care as per protocol  - aspiration precaution

## 2017-07-14 NOTE — PROGRESS NOTE ADULT - SUBJECTIVE AND OBJECTIVE BOX
Patient is a 81y old  Female who presents with a chief complaint of Diarrhea (07 Jul 2017 10:50)      SUBJECTIVE / OVERNIGHT EVENTS: patient seen and examined by bedside, no acute events over night, no acute distress noted          MEDICATIONS  (STANDING):  apixaban 2.5 milliGRAM(s) Oral two times a day  sertraline 50 milliGRAM(s) Oral daily  atorvastatin 10 milliGRAM(s) Oral at bedtime  ferrous    sulfate 325 milliGRAM(s) Oral daily  levothyroxine 75 MICROGram(s) Oral daily  insulin lispro (HumaLOG) corrective regimen sliding scale   SubCutaneous three times a day before meals  insulin lispro (HumaLOG) corrective regimen sliding scale   SubCutaneous at bedtime  dextrose 5%. 1000 milliLiter(s) (50 mL/Hr) IV Continuous <Continuous>  dextrose 50% Injectable 12.5 Gram(s) IV Push once  dextrose 50% Injectable 25 Gram(s) IV Push once  dextrose 50% Injectable 25 Gram(s) IV Push once  sodium chloride 0.9%. 1000 milliLiter(s) (50 mL/Hr) IV Continuous <Continuous>  valproic  acid Syrup 250 milliGRAM(s) Oral three times a day  piperacillin/tazobactam IVPB. 3.375 Gram(s) IV Intermittent every 8 hours    MEDICATIONS  (PRN):  dextrose Gel 1 Dose(s) Oral once PRN Blood Glucose LESS THAN 70 milliGRAM(s)/deciliter  glucagon  Injectable 1 milliGRAM(s) IntraMuscular once PRN Glucose LESS THAN 70 milligrams/deciliter  QUEtiapine 25 milliGRAM(s) Oral every 4 hours PRN For agitation  artificial  tears Solution 1 Drop(s) Both EYES four times a day PRN Dry Eyes  polyethylene glycol 3350 17 Gram(s) Oral daily PRN Constipation  acetaminophen    Suspension. 650 milliGRAM(s) Oral every 6 hours PRN Mild Pain (1 - 3) and moderate pain  bisacodyl Suppository 10 milliGRAM(s) Rectal daily PRN Constipation  artificial tears (preservative free) Ophthalmic Solution 1 Drop(s) Both EYES every 4 hours PRN Dry Eyes      Vital Signs Last 24 Hrs  T(C): 36.7 (07-14-17 @ 06:29), Max: 36.7 (07-14-17 @ 06:29)  HR: 72 (07-14-17 @ 06:29) (71 - 72)  BP: 124/66 (07-14-17 @ 06:29) (124/66 - 129/69)  RR: 16 (07-14-17 @ 06:29) (16 - 17)  SpO2: 98% (07-14-17 @ 06:29) (98% - 98%)  CAPILLARY BLOOD GLUCOSE  167 (14 Jul 2017 12:29)  228 (14 Jul 2017 08:43)  196 (13 Jul 2017 22:12)  172 (13 Jul 2017 16:41)        I&O's Summary    13 Jul 2017 07:01  -  14 Jul 2017 07:00  --------------------------------------------------------  IN: 100 mL / OUT: 1275 mL / NET: -1175 mL      PHYSICAL EXAM:  GENERAL: NAD,   HEAD:  Atraumatic, Normocephalic  EYES: EOMI, PERRLA, conjunctiva and sclera clear  NECK: Supple, No JVD  CHEST/LUNG: Clear to auscultation bilaterally; No wheeze  HEART: Regular rate and rhythm; No murmurs, rubs, or gallops  ABDOMEN: Soft, Nontender, Nondistended; Bowel sounds present  : Rt nephrostomy tube in place   EXTREMITIES:  , No clubbing, cyanosis, or edema  PSYCH: awake, minimally verbal   NEUROLOGY: non-focal  SKIN: No rashes or lesions        LABS:                        7.7    21.87 )-----------( 707      ( 14 Jul 2017 06:27 )             23.5     07-14    138  |  100  |  10  ----------------------------<  200<H>  3.6   |  28  |  0.41<L>    Ca    9.1      14 Jul 2017 06:27      PT/INR - ( 13 Jul 2017 06:05 )   PT: 15.0 SEC;   INR: 1.33          PTT - ( 13 Jul 2017 06:05 )  PTT:31.9 SEC          RADIOLOGY & ADDITIONAL TESTS:    Imaging Personally Reviewed:    Consultant(s) Notes Reviewed:      Care Discussed with Consultants/Other Providers:

## 2017-07-14 NOTE — PROGRESS NOTE ADULT - PROBLEM SELECTOR PLAN 1
-To IR with Dr. Pacheco for R Nephroureteral stent, to be capped.  -Bowel regimen  -Trend WBC  -Patient has multiple medical comorbidities, and is not a good surgical candidate   -Follow up goals of care -To IR with Dr. Pacheco for R Nephroureteral stent, to be capped.  -Post-procedure check  -Bowel regimen  -Trend WBC  -Patient has multiple medical comorbidities, and is not a good surgical candidate   -Follow up goals of care

## 2017-07-15 LAB
BACTERIA BLD CULT: SIGNIFICANT CHANGE UP
BACTERIA BLD CULT: SIGNIFICANT CHANGE UP
BLD GP AB SCN SERPL QL: NEGATIVE — SIGNIFICANT CHANGE UP
BUN SERPL-MCNC: 12 MG/DL — SIGNIFICANT CHANGE UP (ref 7–23)
CALCIUM SERPL-MCNC: 9.4 MG/DL — SIGNIFICANT CHANGE UP (ref 8.4–10.5)
CHLORIDE SERPL-SCNC: 102 MMOL/L — SIGNIFICANT CHANGE UP (ref 98–107)
CO2 SERPL-SCNC: 28 MMOL/L — SIGNIFICANT CHANGE UP (ref 22–31)
CREAT SERPL-MCNC: 0.52 MG/DL — SIGNIFICANT CHANGE UP (ref 0.5–1.3)
GLUCOSE SERPL-MCNC: 212 MG/DL — HIGH (ref 70–99)
HCT VFR BLD CALC: 26 % — LOW (ref 34.5–45)
HCT VFR BLD CALC: 27.9 % — LOW (ref 34.5–45)
HGB BLD-MCNC: 8.2 G/DL — LOW (ref 11.5–15.5)
HGB BLD-MCNC: 8.9 G/DL — LOW (ref 11.5–15.5)
MCHC RBC-ENTMCNC: 27.2 PG — SIGNIFICANT CHANGE UP (ref 27–34)
MCHC RBC-ENTMCNC: 27.4 PG — SIGNIFICANT CHANGE UP (ref 27–34)
MCHC RBC-ENTMCNC: 31.5 % — LOW (ref 32–36)
MCHC RBC-ENTMCNC: 31.9 % — LOW (ref 32–36)
MCV RBC AUTO: 85.8 FL — SIGNIFICANT CHANGE UP (ref 80–100)
MCV RBC AUTO: 86.1 FL — SIGNIFICANT CHANGE UP (ref 80–100)
NRBC # FLD: 0 — SIGNIFICANT CHANGE UP
NRBC # FLD: 0 — SIGNIFICANT CHANGE UP
PLATELET # BLD AUTO: 838 K/UL — HIGH (ref 150–400)
PLATELET # BLD AUTO: 866 K/UL — HIGH (ref 150–400)
PMV BLD: 10.2 FL — SIGNIFICANT CHANGE UP (ref 7–13)
PMV BLD: 10.5 FL — SIGNIFICANT CHANGE UP (ref 7–13)
POTASSIUM SERPL-MCNC: 3.6 MMOL/L — SIGNIFICANT CHANGE UP (ref 3.5–5.3)
POTASSIUM SERPL-SCNC: 3.6 MMOL/L — SIGNIFICANT CHANGE UP (ref 3.5–5.3)
RBC # BLD: 3.02 M/UL — LOW (ref 3.8–5.2)
RBC # BLD: 3.25 M/UL — LOW (ref 3.8–5.2)
RBC # FLD: 17.3 % — HIGH (ref 10.3–14.5)
RBC # FLD: 17.4 % — HIGH (ref 10.3–14.5)
RH IG SCN BLD-IMP: POSITIVE — SIGNIFICANT CHANGE UP
SODIUM SERPL-SCNC: 140 MMOL/L — SIGNIFICANT CHANGE UP (ref 135–145)
WBC # BLD: 15.41 K/UL — HIGH (ref 3.8–10.5)
WBC # BLD: 22.3 K/UL — HIGH (ref 3.8–10.5)
WBC # FLD AUTO: 15.41 K/UL — HIGH (ref 3.8–10.5)
WBC # FLD AUTO: 22.3 K/UL — HIGH (ref 3.8–10.5)

## 2017-07-15 PROCEDURE — 99233 SBSQ HOSP IP/OBS HIGH 50: CPT

## 2017-07-15 PROCEDURE — 71010: CPT | Mod: 26

## 2017-07-15 RX ADMIN — Medication 2: at 18:11

## 2017-07-15 RX ADMIN — Medication 75 MICROGRAM(S): at 06:57

## 2017-07-15 RX ADMIN — PIPERACILLIN AND TAZOBACTAM 25 GRAM(S): 4; .5 INJECTION, POWDER, LYOPHILIZED, FOR SOLUTION INTRAVENOUS at 21:06

## 2017-07-15 RX ADMIN — Medication 2: at 08:43

## 2017-07-15 RX ADMIN — PIPERACILLIN AND TAZOBACTAM 25 GRAM(S): 4; .5 INJECTION, POWDER, LYOPHILIZED, FOR SOLUTION INTRAVENOUS at 15:20

## 2017-07-15 RX ADMIN — Medication 650 MILLIGRAM(S): at 21:36

## 2017-07-15 RX ADMIN — QUETIAPINE FUMARATE 25 MILLIGRAM(S): 200 TABLET, FILM COATED ORAL at 18:12

## 2017-07-15 RX ADMIN — SERTRALINE 50 MILLIGRAM(S): 25 TABLET, FILM COATED ORAL at 11:39

## 2017-07-15 RX ADMIN — Medication 3: at 12:26

## 2017-07-15 RX ADMIN — Medication 250 MILLIGRAM(S): at 21:06

## 2017-07-15 RX ADMIN — Medication 650 MILLIGRAM(S): at 11:39

## 2017-07-15 RX ADMIN — APIXABAN 2.5 MILLIGRAM(S): 2.5 TABLET, FILM COATED ORAL at 18:12

## 2017-07-15 RX ADMIN — Medication 250 MILLIGRAM(S): at 06:57

## 2017-07-15 RX ADMIN — PIPERACILLIN AND TAZOBACTAM 25 GRAM(S): 4; .5 INJECTION, POWDER, LYOPHILIZED, FOR SOLUTION INTRAVENOUS at 07:01

## 2017-07-15 RX ADMIN — ATORVASTATIN CALCIUM 10 MILLIGRAM(S): 80 TABLET, FILM COATED ORAL at 21:06

## 2017-07-15 RX ADMIN — Medication 325 MILLIGRAM(S): at 11:39

## 2017-07-15 RX ADMIN — Medication 650 MILLIGRAM(S): at 12:09

## 2017-07-15 RX ADMIN — APIXABAN 2.5 MILLIGRAM(S): 2.5 TABLET, FILM COATED ORAL at 06:57

## 2017-07-15 RX ADMIN — Medication 250 MILLIGRAM(S): at 15:20

## 2017-07-15 RX ADMIN — Medication 650 MILLIGRAM(S): at 21:06

## 2017-07-15 NOTE — PROGRESS NOTE ADULT - PROBLEM SELECTOR PLAN 2
resolved. s/p flagyl x 10 d  -Ct A/p noted , Marked improvement in hydronephrosis on the right.   However, ill-defined low density collection in the right posterior   pararenal space has progressed in the interim.   urology consult  noted, Nephrostomy tube changed by IR on 7/10,  Attempt to change  nephroureteral stent change by IR yesterday was unsuccessful ,plan for nephroureteral stent change on Monday by IR and urology together   NPO after midnight tomorrow

## 2017-07-15 NOTE — PROGRESS NOTE ADULT - SUBJECTIVE AND OBJECTIVE BOX
Patient is a 81y old  Female who presents with a chief complaint of Diarrhea (07 Jul 2017 10:50)      SUBJECTIVE / OVERNIGHT EVENTS: patient seen and examined by bedside, s/p failed stent exchange by IR . Required straight cath for 500cc urine post-procedure, currently Nephrostomy Tube  draining  bloody  urine, and pt had voided urine as well           MEDICATIONS  (STANDING):  apixaban 2.5 milliGRAM(s) Oral two times a day  sertraline 50 milliGRAM(s) Oral daily  atorvastatin 10 milliGRAM(s) Oral at bedtime  ferrous    sulfate 325 milliGRAM(s) Oral daily  levothyroxine 75 MICROGram(s) Oral daily  insulin lispro (HumaLOG) corrective regimen sliding scale   SubCutaneous three times a day before meals  insulin lispro (HumaLOG) corrective regimen sliding scale   SubCutaneous at bedtime  dextrose 5%. 1000 milliLiter(s) (50 mL/Hr) IV Continuous <Continuous>  dextrose 50% Injectable 12.5 Gram(s) IV Push once  dextrose 50% Injectable 25 Gram(s) IV Push once  dextrose 50% Injectable 25 Gram(s) IV Push once  sodium chloride 0.9%. 1000 milliLiter(s) (50 mL/Hr) IV Continuous <Continuous>  valproic  acid Syrup 250 milliGRAM(s) Oral three times a day  piperacillin/tazobactam IVPB. 3.375 Gram(s) IV Intermittent every 8 hours    MEDICATIONS  (PRN):  dextrose Gel 1 Dose(s) Oral once PRN Blood Glucose LESS THAN 70 milliGRAM(s)/deciliter  glucagon  Injectable 1 milliGRAM(s) IntraMuscular once PRN Glucose LESS THAN 70 milligrams/deciliter  QUEtiapine 25 milliGRAM(s) Oral every 4 hours PRN For agitation  artificial  tears Solution 1 Drop(s) Both EYES four times a day PRN Dry Eyes  polyethylene glycol 3350 17 Gram(s) Oral daily PRN Constipation  acetaminophen    Suspension. 650 milliGRAM(s) Oral every 6 hours PRN Mild Pain (1 - 3) and moderate pain  bisacodyl Suppository 10 milliGRAM(s) Rectal daily PRN Constipation  artificial tears (preservative free) Ophthalmic Solution 1 Drop(s) Both EYES every 4 hours PRN Dry Eyes      Vital Signs Last 24 Hrs  T(C): 37.1 (07-15-17 @ 14:34), Max: 37.3 (07-14-17 @ 20:20)  HR: 54 (07-15-17 @ 14:34) (54 - 98)  BP: 112/58 (07-15-17 @ 14:34) (106/60 - 120/66)  RR: 18 (07-15-17 @ 14:34) (18 - 18)  SpO2: 95% (07-15-17 @ 14:34) (95% - 97%)  CAPILLARY BLOOD GLUCOSE  246 (15 Jul 2017 16:39)  261 (15 Jul 2017 12:06)  246 (15 Jul 2017 08:36)  241 (14 Jul 2017 22:35)  171 (14 Jul 2017 19:04)        I&O's Summary    14 Jul 2017 07:01  -  15 Jul 2017 07:00  --------------------------------------------------------  IN: 300 mL / OUT: 525 mL / NET: -225 mL      PHYSICAL EXAM:  GENERAL: NAD,   HEAD:  Atraumatic, Normocephalic  EYES: EOMI, PERRLA, conjunctiva and sclera clear  NECK: Supple, No JVD  CHEST/LUNG: Clear to auscultation bilaterally; No wheeze  HEART: Regular rate and rhythm; No murmurs, rubs, or gallops  ABDOMEN: Soft, Nontender, Nondistended; Bowel sounds present  : Rt nephrostomy tube in place with bloody urine in bag   EXTREMITIES:  , No clubbing, cyanosis, or edema  PSYCH: awake, minimally verbal   NEUROLOGY: non-focal  SKIN: No rashes or lesions        LABS:                        8.2    22.30 )-----------( 838      ( 15 Jul 2017 07:10 )             26.0     07-15    140  |  102  |  12  ----------------------------<  212<H>  3.6   |  28  |  0.52    Ca    9.4      15 Jul 2017 07:10                RADIOLOGY & ADDITIONAL TESTS:    Imaging Personally Reviewed:    Consultant(s) Notes Reviewed:  urology    Care Discussed with Consultants/Other Providers: urology

## 2017-07-15 NOTE — PROGRESS NOTE ADULT - PROBLEM SELECTOR PLAN 7
DVT chronic   - continue Eliquis DVT chronic   - continue Eliquis  -will hold eliquis if persistent hematuria and H/H drops

## 2017-07-15 NOTE — PROGRESS NOTE ADULT - PROBLEM SELECTOR PLAN 1
Pt had fever and leukocytosis, fever resolved, persistent leucocytosis , likely multifactorial, malignancy, post procedure, UTI with pseudomonas   Pt s/p recent urological procedure,  zosyn was  added empirically, .   Urine cx with Pseudomonas aeruginosa   Attempt to change  nephroureteral stent change by IR yesterday was unsuccessful ,plan for nephroureteral stent change on Monday by IR and urology together   NPO after midnight tomorrow   will monitor CBC for hematuria , currently H/H stable

## 2017-07-15 NOTE — PROGRESS NOTE ADULT - SUBJECTIVE AND OBJECTIVE BOX
S: Required straight cath for 500cc urine post-procedure. Neph urine draining punch colored urine, Hct stable at 26 today, persistent leukocytosis. On zosyn for pseudomonas in UCx. Pending joint IR /  procedure Monday to replace stent.     O:  T(F): 96.7 (07-15-17 @ 06:54), Max: 96.7 (07-15-17 @ 06:54)  HR: 66 (07-15-17 @ 06:54) (66 - 66)  BP: 106/60 (07-15-17 @ 06:54) (106/60 - 106/60)  RR: 18 (07-15-17 @ 06:54) (18 - 18)  SpO2: 97% (07-15-17 @ 06:54) (97% - 97%)  Wt(kg): --      07-14 @ 07:01  -  07-15 @ 07:00  --------------------------------------------------------  IN: 300 mL / OUT: 525 mL / NET: -225 mL        PE  NAD   abd benign  R neph punch colored, expected post-procedure    Labs:  WBC 22.30   Hct 26.0  Cr 0.52      Cultures:  UCx: pseudomonas

## 2017-07-15 NOTE — CHART NOTE - NSCHARTNOTEFT_GEN_A_CORE
Notified by RN patient coughing after medication administration, family concerned may have aspirated.  Seen and examined patient at the bedside, patient's lungs clear upon auscultation, coughing with clear sputum noted, suctioned at this time.  Speech and swallow ordered, diet changed to nectar thicken liquids for now, suction prn, will continue to monitor.

## 2017-07-15 NOTE — CHART NOTE - NSCHARTNOTEFT_GEN_A_CORE
Notified by RN patient's nephrostomy tube has not drained for the past 3 hours.  Patient is s/s right nephrostomy tube exchanged by IR.  Patient returned from PACU around 8pm.  Seen and examined patient at the bedside, patient is alert and oriented x0, unable to verbalize needs, does not appear in pain or distress, nephrostomy tube is in place with small amount of blood in the tubing with no urine output in bag.  Family at the bedside and daughterVera concerned at this time. Samara Mike ([id number]) made aware. Patient's daughter, Vera spoke directly to Samara Mike regarding no urine output.  Samara Mike will contact her attending for further recommendations at this time.  Will continue to monitor.

## 2017-07-15 NOTE — PROGRESS NOTE ADULT - ASSESSMENT
A/P: 81F poor surgical candidate with known R renal mass and obstructive uropathy s/p R nephrostomy tube and separate R ureteral stent with failed stent exchange by IR.     Likely had post-procedural urinary retention from anesthesia, no need for sanchez at this time.     -- check voids, UOP  -- continue zosyn periprocedure  -- NPOpMN Sunday into Monday  -- will coordinate procedure with IR Monday  -- flush nephrostomy prn  -- continue care per medicine

## 2017-07-16 LAB
BUN SERPL-MCNC: 13 MG/DL — SIGNIFICANT CHANGE UP (ref 7–23)
CALCIUM SERPL-MCNC: 9.3 MG/DL — SIGNIFICANT CHANGE UP (ref 8.4–10.5)
CHLORIDE SERPL-SCNC: 100 MMOL/L — SIGNIFICANT CHANGE UP (ref 98–107)
CO2 SERPL-SCNC: 28 MMOL/L — SIGNIFICANT CHANGE UP (ref 22–31)
CREAT SERPL-MCNC: 0.5 MG/DL — SIGNIFICANT CHANGE UP (ref 0.5–1.3)
GLUCOSE SERPL-MCNC: 211 MG/DL — HIGH (ref 70–99)
HCT VFR BLD CALC: 20.4 % — CRITICAL LOW (ref 34.5–45)
HCT VFR BLD CALC: 22.8 % — LOW (ref 34.5–45)
HGB BLD-MCNC: 6.6 G/DL — CRITICAL LOW (ref 11.5–15.5)
HGB BLD-MCNC: 7.2 G/DL — LOW (ref 11.5–15.5)
MCHC RBC-ENTMCNC: 26.9 PG — LOW (ref 27–34)
MCHC RBC-ENTMCNC: 27 PG — SIGNIFICANT CHANGE UP (ref 27–34)
MCHC RBC-ENTMCNC: 31.6 % — LOW (ref 32–36)
MCHC RBC-ENTMCNC: 32.4 % — SIGNIFICANT CHANGE UP (ref 32–36)
MCV RBC AUTO: 83.3 FL — SIGNIFICANT CHANGE UP (ref 80–100)
MCV RBC AUTO: 85.4 FL — SIGNIFICANT CHANGE UP (ref 80–100)
NRBC # FLD: 0 — SIGNIFICANT CHANGE UP
NRBC # FLD: 0 — SIGNIFICANT CHANGE UP
PLATELET # BLD AUTO: 787 K/UL — HIGH (ref 150–400)
PLATELET # BLD AUTO: 855 K/UL — HIGH (ref 150–400)
PMV BLD: 10.4 FL — SIGNIFICANT CHANGE UP (ref 7–13)
PMV BLD: 10.6 FL — SIGNIFICANT CHANGE UP (ref 7–13)
POTASSIUM SERPL-MCNC: 3.1 MMOL/L — LOW (ref 3.5–5.3)
POTASSIUM SERPL-SCNC: 3.1 MMOL/L — LOW (ref 3.5–5.3)
RBC # BLD: 2.45 M/UL — LOW (ref 3.8–5.2)
RBC # BLD: 2.67 M/UL — LOW (ref 3.8–5.2)
RBC # FLD: 17.3 % — HIGH (ref 10.3–14.5)
RBC # FLD: 17.4 % — HIGH (ref 10.3–14.5)
RH IG SCN BLD-IMP: POSITIVE — SIGNIFICANT CHANGE UP
SODIUM SERPL-SCNC: 139 MMOL/L — SIGNIFICANT CHANGE UP (ref 135–145)
WBC # BLD: 21.69 K/UL — HIGH (ref 3.8–10.5)
WBC # BLD: 24.19 K/UL — HIGH (ref 3.8–10.5)
WBC # FLD AUTO: 21.69 K/UL — HIGH (ref 3.8–10.5)
WBC # FLD AUTO: 24.19 K/UL — HIGH (ref 3.8–10.5)

## 2017-07-16 PROCEDURE — 99233 SBSQ HOSP IP/OBS HIGH 50: CPT

## 2017-07-16 RX ORDER — ACETAMINOPHEN 500 MG
650 TABLET ORAL EVERY 6 HOURS
Qty: 0 | Refills: 0 | Status: DISCONTINUED | OUTPATIENT
Start: 2017-07-16 | End: 2017-08-05

## 2017-07-16 RX ORDER — POTASSIUM CHLORIDE 20 MEQ
10 PACKET (EA) ORAL
Qty: 0 | Refills: 0 | Status: COMPLETED | OUTPATIENT
Start: 2017-07-16 | End: 2017-07-16

## 2017-07-16 RX ADMIN — SODIUM CHLORIDE 50 MILLILITER(S): 9 INJECTION INTRAMUSCULAR; INTRAVENOUS; SUBCUTANEOUS at 09:03

## 2017-07-16 RX ADMIN — Medication 650 MILLIGRAM(S): at 20:00

## 2017-07-16 RX ADMIN — Medication 250 MILLIGRAM(S): at 21:35

## 2017-07-16 RX ADMIN — Medication 100 MILLIEQUIVALENT(S): at 14:12

## 2017-07-16 RX ADMIN — Medication 650 MILLIGRAM(S): at 13:30

## 2017-07-16 RX ADMIN — PIPERACILLIN AND TAZOBACTAM 25 GRAM(S): 4; .5 INJECTION, POWDER, LYOPHILIZED, FOR SOLUTION INTRAVENOUS at 14:35

## 2017-07-16 RX ADMIN — Medication 250 MILLIGRAM(S): at 15:03

## 2017-07-16 RX ADMIN — SODIUM CHLORIDE 50 MILLILITER(S): 9 INJECTION INTRAMUSCULAR; INTRAVENOUS; SUBCUTANEOUS at 09:15

## 2017-07-16 RX ADMIN — Medication 100 MILLIEQUIVALENT(S): at 12:41

## 2017-07-16 RX ADMIN — Medication: at 17:12

## 2017-07-16 RX ADMIN — Medication 2: at 09:02

## 2017-07-16 RX ADMIN — SERTRALINE 50 MILLIGRAM(S): 25 TABLET, FILM COATED ORAL at 12:39

## 2017-07-16 RX ADMIN — PIPERACILLIN AND TAZOBACTAM 25 GRAM(S): 4; .5 INJECTION, POWDER, LYOPHILIZED, FOR SOLUTION INTRAVENOUS at 05:45

## 2017-07-16 RX ADMIN — QUETIAPINE FUMARATE 25 MILLIGRAM(S): 200 TABLET, FILM COATED ORAL at 19:19

## 2017-07-16 RX ADMIN — Medication 250 MILLIGRAM(S): at 05:45

## 2017-07-16 RX ADMIN — Medication 650 MILLIGRAM(S): at 12:39

## 2017-07-16 RX ADMIN — PIPERACILLIN AND TAZOBACTAM 25 GRAM(S): 4; .5 INJECTION, POWDER, LYOPHILIZED, FOR SOLUTION INTRAVENOUS at 21:46

## 2017-07-16 RX ADMIN — Medication 3: at 12:39

## 2017-07-16 RX ADMIN — Medication 325 MILLIGRAM(S): at 12:39

## 2017-07-16 RX ADMIN — Medication 75 MICROGRAM(S): at 05:45

## 2017-07-16 RX ADMIN — ATORVASTATIN CALCIUM 10 MILLIGRAM(S): 80 TABLET, FILM COATED ORAL at 21:38

## 2017-07-16 RX ADMIN — Medication 650 MILLIGRAM(S): at 19:20

## 2017-07-16 RX ADMIN — QUETIAPINE FUMARATE 25 MILLIGRAM(S): 200 TABLET, FILM COATED ORAL at 12:39

## 2017-07-16 RX ADMIN — APIXABAN 2.5 MILLIGRAM(S): 2.5 TABLET, FILM COATED ORAL at 05:45

## 2017-07-16 RX ADMIN — Medication 100 MILLIEQUIVALENT(S): at 15:03

## 2017-07-16 NOTE — PROGRESS NOTE ADULT - SUBJECTIVE AND OBJECTIVE BOX
Patient is a 81y old  Female who presents with a chief complaint of Diarrhea (07 Jul 2017 10:50)      SUBJECTIVE / OVERNIGHT EVENTS: patient seen and examined by bedside, still having hematuria in nephrostomy tube ,       MEDICATIONS  (STANDING):  sertraline 50 milliGRAM(s) Oral daily  atorvastatin 10 milliGRAM(s) Oral at bedtime  ferrous    sulfate 325 milliGRAM(s) Oral daily  levothyroxine 75 MICROGram(s) Oral daily  insulin lispro (HumaLOG) corrective regimen sliding scale   SubCutaneous three times a day before meals  insulin lispro (HumaLOG) corrective regimen sliding scale   SubCutaneous at bedtime  dextrose 5%. 1000 milliLiter(s) (50 mL/Hr) IV Continuous <Continuous>  dextrose 50% Injectable 12.5 Gram(s) IV Push once  dextrose 50% Injectable 25 Gram(s) IV Push once  dextrose 50% Injectable 25 Gram(s) IV Push once  sodium chloride 0.9%. 1000 milliLiter(s) (50 mL/Hr) IV Continuous <Continuous>  valproic  acid Syrup 250 milliGRAM(s) Oral three times a day  piperacillin/tazobactam IVPB. 3.375 Gram(s) IV Intermittent every 8 hours    MEDICATIONS  (PRN):  dextrose Gel 1 Dose(s) Oral once PRN Blood Glucose LESS THAN 70 milliGRAM(s)/deciliter  glucagon  Injectable 1 milliGRAM(s) IntraMuscular once PRN Glucose LESS THAN 70 milligrams/deciliter  QUEtiapine 25 milliGRAM(s) Oral every 4 hours PRN For agitation  artificial  tears Solution 1 Drop(s) Both EYES four times a day PRN Dry Eyes  polyethylene glycol 3350 17 Gram(s) Oral daily PRN Constipation  acetaminophen    Suspension. 650 milliGRAM(s) Oral every 6 hours PRN Mild Pain (1 - 3) and moderate pain  bisacodyl Suppository 10 milliGRAM(s) Rectal daily PRN Constipation  artificial tears (preservative free) Ophthalmic Solution 1 Drop(s) Both EYES every 4 hours PRN Dry Eyes  acetaminophen   Tablet. 650 milliGRAM(s) Oral every 6 hours PRN Moderate Pain (4 - 6)      Vital Signs Last 24 Hrs  T(C): 37.1 (07-16-17 @ 21:04), Max: 37.1 (07-16-17 @ 21:04)  HR: 74 (07-16-17 @ 21:04) (65 - 97)  BP: 123/63 (07-16-17 @ 21:04) (100/51 - 126/65)  RR: 18 (07-16-17 @ 21:04) (18 - 19)  SpO2: 95% (07-16-17 @ 21:04) (95% - 97%)  CAPILLARY BLOOD GLUCOSE  194 (16 Jul 2017 16:18)  269 (16 Jul 2017 12:05)  224 (16 Jul 2017 08:08)  218 (15 Jul 2017 22:34)        I&O's Summary    15 Jul 2017 07:01  -  16 Jul 2017 07:00  --------------------------------------------------------  IN: 0 mL / OUT: 100 mL / NET: -100 mL    PHYSICAL EXAM:  GENERAL: NAD,   HEAD:  Atraumatic, Normocephalic  EYES: EOMI, PERRLA, conjunctiva and sclera clear  NECK: Supple, No JVD  CHEST/LUNG: Clear to auscultation bilaterally; No wheeze  HEART: Regular rate and rhythm; No murmurs, rubs, or gallops  ABDOMEN: Soft, Nontender, Nondistended; Bowel sounds present  : Rt nephrostomy tube in place with bloody urine in bag   EXTREMITIES:  , No clubbing, cyanosis, or edema  PSYCH: awake, minimally verbal   NEUROLOGY: non-focal  SKIN: No rashes or lesions    LABS:                        6.6    21.69 )-----------( 787      ( 16 Jul 2017 13:49 )             20.4     07-16    139  |  100  |  13  ----------------------------<  211<H>  3.1<L>   |  28  |  0.50    Ca    9.3      16 Jul 2017 06:55                RADIOLOGY & ADDITIONAL TESTS:    Imaging Personally Reviewed:    Consultant(s) Notes Reviewed:      Care Discussed with Consultants/Other Providers:

## 2017-07-16 NOTE — PROGRESS NOTE ADULT - PROBLEM SELECTOR PLAN 1
Pt had fever and leukocytosis, fever resolved, persistent leucocytosis , likely multifactorial, malignancy, post procedure, UTI with pseudomonas   Pt s/p recent urological procedure,  zosyn was  added empirically, .   Urine cx with Pseudomonas aeruginosa   Attempt to change  nephroureteral stent change by IR  on Friday was unsuccessful ,plan for nephroureteral stent change on Monday by IR and urology together   NPO after midnight tonight   CBC noted , H/H trended down, on repeat monitoring, will transfuse one unit PRBC, monitor CBC post transfusion  will hold eliquis for hematuria and procedure in Am

## 2017-07-16 NOTE — PROGRESS NOTE ADULT - PROBLEM SELECTOR PLAN 2
resolved. s/p flagyl x 10 d  -Ct A/p noted , Marked improvement in hydronephrosis on the right.   However, ill-defined low density collection in the right posterior   pararenal space has progressed in the interim.   urology consult  noted, Nephrostomy tube changed by IR on 7/10,  Attempt to change  nephroureteral stent change by IR on Friday was unsuccessful ,plan for nephroureteral stent change on Monday by IR and urology together   NPO after midnight tonight

## 2017-07-17 DIAGNOSIS — R31.9 HEMATURIA, UNSPECIFIED: ICD-10-CM

## 2017-07-17 LAB
BACTERIA BLD CULT: SIGNIFICANT CHANGE UP
BACTERIA BLD CULT: SIGNIFICANT CHANGE UP
BUN SERPL-MCNC: 11 MG/DL — SIGNIFICANT CHANGE UP (ref 7–23)
CALCIUM SERPL-MCNC: 8.8 MG/DL — SIGNIFICANT CHANGE UP (ref 8.4–10.5)
CHLORIDE SERPL-SCNC: 100 MMOL/L — SIGNIFICANT CHANGE UP (ref 98–107)
CO2 SERPL-SCNC: 28 MMOL/L — SIGNIFICANT CHANGE UP (ref 22–31)
CREAT SERPL-MCNC: 0.47 MG/DL — LOW (ref 0.5–1.3)
GLUCOSE SERPL-MCNC: 189 MG/DL — HIGH (ref 70–99)
HCT VFR BLD CALC: 25.2 % — LOW (ref 34.5–45)
HCT VFR BLD CALC: 26.8 % — LOW (ref 34.5–45)
HGB BLD-MCNC: 8.4 G/DL — LOW (ref 11.5–15.5)
HGB BLD-MCNC: 8.9 G/DL — LOW (ref 11.5–15.5)
MCHC RBC-ENTMCNC: 27.7 PG — SIGNIFICANT CHANGE UP (ref 27–34)
MCHC RBC-ENTMCNC: 28.4 PG — SIGNIFICANT CHANGE UP (ref 27–34)
MCHC RBC-ENTMCNC: 33.2 % — SIGNIFICANT CHANGE UP (ref 32–36)
MCHC RBC-ENTMCNC: 33.3 % — SIGNIFICANT CHANGE UP (ref 32–36)
MCV RBC AUTO: 83.2 FL — SIGNIFICANT CHANGE UP (ref 80–100)
MCV RBC AUTO: 85.6 FL — SIGNIFICANT CHANGE UP (ref 80–100)
NRBC # FLD: 0 — SIGNIFICANT CHANGE UP
NRBC # FLD: 0 — SIGNIFICANT CHANGE UP
PLATELET # BLD AUTO: 683 K/UL — HIGH (ref 150–400)
PLATELET # BLD AUTO: 793 K/UL — HIGH (ref 150–400)
PMV BLD: 10.5 FL — SIGNIFICANT CHANGE UP (ref 7–13)
PMV BLD: 10.8 FL — SIGNIFICANT CHANGE UP (ref 7–13)
POTASSIUM SERPL-MCNC: 3.3 MMOL/L — LOW (ref 3.5–5.3)
POTASSIUM SERPL-SCNC: 3.3 MMOL/L — LOW (ref 3.5–5.3)
RBC # BLD: 3.03 M/UL — LOW (ref 3.8–5.2)
RBC # BLD: 3.13 M/UL — LOW (ref 3.8–5.2)
RBC # FLD: 17.2 % — HIGH (ref 10.3–14.5)
RBC # FLD: 17.2 % — HIGH (ref 10.3–14.5)
SODIUM SERPL-SCNC: 139 MMOL/L — SIGNIFICANT CHANGE UP (ref 135–145)
WBC # BLD: 23.81 K/UL — HIGH (ref 3.8–10.5)
WBC # BLD: 24.7 K/UL — HIGH (ref 3.8–10.5)
WBC # FLD AUTO: 23.81 K/UL — HIGH (ref 3.8–10.5)
WBC # FLD AUTO: 24.7 K/UL — HIGH (ref 3.8–10.5)

## 2017-07-17 PROCEDURE — 99232 SBSQ HOSP IP/OBS MODERATE 35: CPT

## 2017-07-17 PROCEDURE — 99233 SBSQ HOSP IP/OBS HIGH 50: CPT

## 2017-07-17 PROCEDURE — 50434 CONVERT NEPHROSTOMY CATHETER: CPT | Mod: RT

## 2017-07-17 RX ORDER — POTASSIUM CHLORIDE 20 MEQ
10 PACKET (EA) ORAL
Qty: 0 | Refills: 0 | Status: COMPLETED | OUTPATIENT
Start: 2017-07-17 | End: 2017-07-17

## 2017-07-17 RX ADMIN — Medication 1: at 17:07

## 2017-07-17 RX ADMIN — QUETIAPINE FUMARATE 25 MILLIGRAM(S): 200 TABLET, FILM COATED ORAL at 03:07

## 2017-07-17 RX ADMIN — Medication 650 MILLIGRAM(S): at 21:45

## 2017-07-17 RX ADMIN — Medication 650 MILLIGRAM(S): at 09:37

## 2017-07-17 RX ADMIN — Medication 250 MILLIGRAM(S): at 21:04

## 2017-07-17 RX ADMIN — SERTRALINE 50 MILLIGRAM(S): 25 TABLET, FILM COATED ORAL at 15:49

## 2017-07-17 RX ADMIN — Medication 325 MILLIGRAM(S): at 15:49

## 2017-07-17 RX ADMIN — Medication 650 MILLIGRAM(S): at 15:49

## 2017-07-17 RX ADMIN — SODIUM CHLORIDE 50 MILLILITER(S): 9 INJECTION INTRAMUSCULAR; INTRAVENOUS; SUBCUTANEOUS at 07:42

## 2017-07-17 RX ADMIN — PIPERACILLIN AND TAZOBACTAM 25 GRAM(S): 4; .5 INJECTION, POWDER, LYOPHILIZED, FOR SOLUTION INTRAVENOUS at 21:02

## 2017-07-17 RX ADMIN — Medication 100 MILLIEQUIVALENT(S): at 15:48

## 2017-07-17 RX ADMIN — Medication 100 MILLIEQUIVALENT(S): at 20:45

## 2017-07-17 RX ADMIN — Medication 2: at 08:58

## 2017-07-17 RX ADMIN — Medication 650 MILLIGRAM(S): at 21:02

## 2017-07-17 RX ADMIN — Medication 650 MILLIGRAM(S): at 10:34

## 2017-07-17 RX ADMIN — Medication 250 MILLIGRAM(S): at 15:50

## 2017-07-17 RX ADMIN — Medication 75 MICROGRAM(S): at 06:37

## 2017-07-17 RX ADMIN — Medication 250 MILLIGRAM(S): at 06:38

## 2017-07-17 RX ADMIN — Medication 650 MILLIGRAM(S): at 16:42

## 2017-07-17 RX ADMIN — Medication 100 MILLIEQUIVALENT(S): at 08:58

## 2017-07-17 RX ADMIN — PIPERACILLIN AND TAZOBACTAM 25 GRAM(S): 4; .5 INJECTION, POWDER, LYOPHILIZED, FOR SOLUTION INTRAVENOUS at 06:38

## 2017-07-17 NOTE — PROGRESS NOTE ADULT - SUBJECTIVE AND OBJECTIVE BOX
Follow Up:      Inverval History/ROS:Patient is a 81y old  Female who presents with a chief complaint of Diarrhea (07 Jul 2017 10:50)    S/p stent placement by IR.    She is afebrile.       Allergies    adhesives (Rash)  No Known Drug Allergies    Intolerances        ANTIMICROBIALS:  piperacillin/tazobactam IVPB. 3.375 every 8 hours      OTHER MEDS:  sertraline 50 milliGRAM(s) Oral daily  atorvastatin 10 milliGRAM(s) Oral at bedtime  ferrous    sulfate 325 milliGRAM(s) Oral daily  levothyroxine 75 MICROGram(s) Oral daily  insulin lispro (HumaLOG) corrective regimen sliding scale   SubCutaneous three times a day before meals  insulin lispro (HumaLOG) corrective regimen sliding scale   SubCutaneous at bedtime  dextrose 5%. 1000 milliLiter(s) IV Continuous <Continuous>  dextrose Gel 1 Dose(s) Oral once PRN  dextrose 50% Injectable 12.5 Gram(s) IV Push once  dextrose 50% Injectable 25 Gram(s) IV Push once  dextrose 50% Injectable 25 Gram(s) IV Push once  glucagon  Injectable 1 milliGRAM(s) IntraMuscular once PRN  QUEtiapine 25 milliGRAM(s) Oral every 4 hours PRN  artificial  tears Solution 1 Drop(s) Both EYES four times a day PRN  polyethylene glycol 3350 17 Gram(s) Oral daily PRN  sodium chloride 0.9%. 1000 milliLiter(s) IV Continuous <Continuous>  acetaminophen    Suspension. 650 milliGRAM(s) Oral every 6 hours PRN  bisacodyl Suppository 10 milliGRAM(s) Rectal daily PRN  valproic  acid Syrup 250 milliGRAM(s) Oral three times a day  acetaminophen   Tablet. 650 milliGRAM(s) Oral every 6 hours PRN  potassium chloride  10 mEq/100 mL IVPB 10 milliEquivalent(s) IV Intermittent every 1 hour      Vital Signs Last 24 Hrs  T(C): 37.2 (17 Jul 2017 15:25), Max: 37.2 (17 Jul 2017 06:31)  T(F): 99 (17 Jul 2017 15:25), Max: 99 (17 Jul 2017 06:31)  HR: 75 (17 Jul 2017 15:25) (72 - 76)  BP: 125/69 (17 Jul 2017 15:25) (123/61 - 126/65)  BP(mean): --  RR: 18 (17 Jul 2017 15:25) (18 - 19)  SpO2: 95% (17 Jul 2017 15:25) (95% - 97%)    PHYSICAL EXAM:  General: [x ] non-toxic  HEAD/EYES: [ ] PERRL [x ] white sclera [ ] icterus  ENT:  [ ] normal [x ] supple [ ] thrush [ ] pharyngeal exudate  Cardiovascular:   [ ] murmur [x ] normal [ ] PPM/AICD  Respiratory:  [x ] clear to ausculation bilaterally  GI:  [x ] soft, non-tender, normal bowel sounds  :  [ ] sanchez [ ] no CVA tenderness   Musculoskeletal:  [ ] no synovitis  Neurologic:  [ ] non-focal exam   Skin:  [x ] no rash  Lymph: [ ] no lymphadenopathy  Psychiatric:  [ ] appropriate affect [ ] alert & oriented  Lines:  [x ] no phlebitis [ ] central line                                8.4    24.70 )-----------( 793      ( 17 Jul 2017 06:44 )             25.2       07-17    139  |  100  |  11  ----------------------------<  189<H>  3.3<L>   |  28  |  0.47<L>    Ca    8.8      17 Jul 2017 06:44            MICROBIOLOGY:    RADIOLOGY:

## 2017-07-17 NOTE — PROGRESS NOTE ADULT - NSHPATTENDINGPLANDISCUSS_GEN_ALL_CORE
Dr. Guzmán
resident team
resident team
daughter
daughters
family and team

## 2017-07-17 NOTE — PROGRESS NOTE ADULT - PROBLEM SELECTOR PLAN 1
-- check voids, UOP  -- continue zosyn periprocedure  -- NPOpMN Sunday into Monday  -- will coordinate procedure with IR Monday  -- flush nephrostomy prn  -- continue care per medicine -- check voids, UOP  -- continue zosyn periprocedure  -- NPOpMN  -- will coordinate procedure with IR today  -- flush nephrostomy prn  -- continue care per medicine -- check voids, UOP  -- Check color  -- continue zosyn periprocedure  -- NPOpMN  -- will coordinate procedure with IR today  -- flush nephrostomy prn  -- continue care per medicine

## 2017-07-17 NOTE — CHART NOTE - NSCHARTNOTEFT_GEN_A_CORE
Patient Age:81    Patient Gender:Female    Procedure (including site / side if known): Right Nephrostomy Tube placement ( with Uro assist for stent )    Diagnosis / Indication: Right renal mass; R obstructive Uropathy; Right tube change    Interventional Radiology Attending Physician: Toy    Ordering Attending Physician: Tessie    Pertinent Medical History:    Renal mass, right  Kidney mass  HTN (hypertension)  Diabetes  Alzheimer's dementia with behavioral disturbance  Alzheimer disease  T2DM (type 2 diabetes mellitus)  HTN (hypertension)  Hypothyroid  Gallstones  History of kidney stones  S/P cholecystectomy  DVT      Pertinent Labs:                            8.4    24.70 )-----------( 793      ( 17 Jul 2017 06:44 )             25.2       07-17    139  |  100  |  11  ----------------------------<  189<H>  3.3<L>   |  28  |  0.47<L>    Ca    8.8      17 Jul 2017 06:44            Patient and Family aware:   [X ]Y   [  ]N

## 2017-07-17 NOTE — PROGRESS NOTE ADULT - ASSESSMENT
81F poor surgical candidate with known R renal mass and obstructive uropathy s/p R nephrostomy tube and separate R ureteral stent with failed stent exchange by IR.

## 2017-07-17 NOTE — PROGRESS NOTE ADULT - ASSESSMENT
She has a pseudomonal UTI.  Continue pseudomonal coverage though 7/19- if she were to be stable for dc could change to cipro.    She continue to have a high WBC.  The next step ion the work up would be exploration of the right renal pelvis mass.   This would likely require invasive procdures.  The family has stated that they do not want aggressive surgical procedures.     I would stop antibiotics on 7/19 and observe.     Given her underlying demenita, her overall prognosis is poor. Continue goals of care discussions.    Call with questions- will follow intermittently but I  Doubt antibiotics will significantly change course.

## 2017-07-17 NOTE — PROGRESS NOTE ADULT - SUBJECTIVE AND OBJECTIVE BOX
Subjective      T(F): , Max: 98.8 (07-16-17 @ 21:04)  HR: 74 (07-16-17 @ 21:04)  BP: 123/63 (07-16-17 @ 21:04)  SpO2: 95% (07-16-17 @ 21:04)    R     Gen  Abd      Labs  07-16 @ 23:40  WBC 23.81 / Hct 26.8  / SCr --       07-16 @ 13:49  WBC 21.69 / Hct 20.4  / SCr -- Subjective  No acute events overnight.  Patient agitated this AM.  NT now putting out punch-colored urine.  Per nurse this was first noticed on Saturday.    T(F): , Max: 98.8 (07-16-17 @ 21:04)  HR: 74 (07-16-17 @ 21:04)  BP: 123/63 (07-16-17 @ 21:04)  SpO2: 95% (07-16-17 @ 21:04)    R     Gen Agitated  Abd Soft, NT, ND   R NT with punch-colored urine    Labs  07-16 @ 23:40  WBC 23.81 / Hct 26.8  / SCr --       07-16 @ 13:49  WBC 21.69 / Hct 20.4  / SCr --

## 2017-07-17 NOTE — SWALLOW BEDSIDE ASSESSMENT ADULT - COMMENTS
Attempted initial assessment of swallow function this afternoon, however, patient currently off the unit at IR. This department to re-attempt as schedule permits.

## 2017-07-17 NOTE — PROGRESS NOTE ADULT - SUBJECTIVE AND OBJECTIVE BOX
Patient is a 81y old  Female who presents with a chief complaint of Diarrhea (07 Jul 2017 10:50)      SUBJECTIVE / OVERNIGHT EVENTS:  Pt is confused as baseline. no event     MEDICATIONS  (STANDING):  sertraline 50 milliGRAM(s) Oral daily  atorvastatin 10 milliGRAM(s) Oral at bedtime  ferrous    sulfate 325 milliGRAM(s) Oral daily  levothyroxine 75 MICROGram(s) Oral daily  insulin lispro (HumaLOG) corrective regimen sliding scale   SubCutaneous three times a day before meals  insulin lispro (HumaLOG) corrective regimen sliding scale   SubCutaneous at bedtime  dextrose 5%. 1000 milliLiter(s) (50 mL/Hr) IV Continuous <Continuous>  dextrose 50% Injectable 12.5 Gram(s) IV Push once  dextrose 50% Injectable 25 Gram(s) IV Push once  dextrose 50% Injectable 25 Gram(s) IV Push once  sodium chloride 0.9%. 1000 milliLiter(s) (50 mL/Hr) IV Continuous <Continuous>  valproic  acid Syrup 250 milliGRAM(s) Oral three times a day  piperacillin/tazobactam IVPB. 3.375 Gram(s) IV Intermittent every 8 hours  potassium chloride  10 mEq/100 mL IVPB 10 milliEquivalent(s) IV Intermittent every 1 hour    MEDICATIONS  (PRN):  dextrose Gel 1 Dose(s) Oral once PRN Blood Glucose LESS THAN 70 milliGRAM(s)/deciliter  glucagon  Injectable 1 milliGRAM(s) IntraMuscular once PRN Glucose LESS THAN 70 milligrams/deciliter  QUEtiapine 25 milliGRAM(s) Oral every 4 hours PRN For agitation  artificial  tears Solution 1 Drop(s) Both EYES four times a day PRN Dry Eyes  polyethylene glycol 3350 17 Gram(s) Oral daily PRN Constipation  acetaminophen    Suspension. 650 milliGRAM(s) Oral every 6 hours PRN Mild Pain (1 - 3) and moderate pain  bisacodyl Suppository 10 milliGRAM(s) Rectal daily PRN Constipation  acetaminophen   Tablet. 650 milliGRAM(s) Oral every 6 hours PRN Moderate Pain (4 - 6)      Vital Signs Last 24 Hrs  T(C): 37.2 (07-17-17 @ 15:25), Max: 37.2 (07-17-17 @ 06:31)  HR: 75 (07-17-17 @ 15:25) (72 - 76)  BP: 125/69 (07-17-17 @ 15:25) (123/61 - 126/65)  RR: 18 (07-17-17 @ 15:25) (18 - 19)  SpO2: 95% (07-17-17 @ 15:25) (95% - 97%)  CAPILLARY BLOOD GLUCOSE  217 (17 Jul 2017 08:33)  203 (16 Jul 2017 22:25)        I&O's Summary    16 Jul 2017 07:01  -  17 Jul 2017 07:00  --------------------------------------------------------  IN: 0 mL / OUT: 335 mL / NET: -335 mL        PHYSICAL EXAM:  GENERAL: NAD  HEAD:  Atraumatic, Normocephalic  NECK: Supple, No JVD  CHEST/LUNG: non-labored in breathing   HEART: s1 s2  ABDOMEN: Soft, Nondistended; Bowel sounds present  EXTREMITIES:  2+ Peripheral Pulses, No clubbing, cyanosis, or edema  PSYCH: awake, alert  NEUROLOGY: non-focal  SKIN: No rashes or lesions    LABS:                        8.4    24.70 )-----------( 793      ( 17 Jul 2017 06:44 )             25.2     07-17    139  |  100  |  11  ----------------------------<  189<H>  3.3<L>   |  28  |  0.47<L>    Ca    8.8      17 Jul 2017 06:44                RADIOLOGY & ADDITIONAL TESTS:    Imaging Personally Reviewed:    Consultant(s) Notes Reviewed:      Care Discussed with Consultants/Other Providers:

## 2017-07-18 LAB
BUN SERPL-MCNC: 11 MG/DL — SIGNIFICANT CHANGE UP (ref 7–23)
CALCIUM SERPL-MCNC: 9.4 MG/DL — SIGNIFICANT CHANGE UP (ref 8.4–10.5)
CHLORIDE SERPL-SCNC: 102 MMOL/L — SIGNIFICANT CHANGE UP (ref 98–107)
CO2 SERPL-SCNC: 28 MMOL/L — SIGNIFICANT CHANGE UP (ref 22–31)
CREAT SERPL-MCNC: 0.44 MG/DL — LOW (ref 0.5–1.3)
GLUCOSE SERPL-MCNC: 179 MG/DL — HIGH (ref 70–99)
HCT VFR BLD CALC: 24.4 % — LOW (ref 34.5–45)
HGB BLD-MCNC: 7.9 G/DL — LOW (ref 11.5–15.5)
MCHC RBC-ENTMCNC: 27 PG — SIGNIFICANT CHANGE UP (ref 27–34)
MCHC RBC-ENTMCNC: 32.4 % — SIGNIFICANT CHANGE UP (ref 32–36)
MCV RBC AUTO: 83.3 FL — SIGNIFICANT CHANGE UP (ref 80–100)
NRBC # FLD: 0 — SIGNIFICANT CHANGE UP
PLATELET # BLD AUTO: 769 K/UL — HIGH (ref 150–400)
PMV BLD: 10.1 FL — SIGNIFICANT CHANGE UP (ref 7–13)
POTASSIUM SERPL-MCNC: 3.3 MMOL/L — LOW (ref 3.5–5.3)
POTASSIUM SERPL-SCNC: 3.3 MMOL/L — LOW (ref 3.5–5.3)
RBC # BLD: 2.93 M/UL — LOW (ref 3.8–5.2)
RBC # FLD: 17.3 % — HIGH (ref 10.3–14.5)
SODIUM SERPL-SCNC: 139 MMOL/L — SIGNIFICANT CHANGE UP (ref 135–145)
WBC # BLD: 23.88 K/UL — HIGH (ref 3.8–10.5)
WBC # FLD AUTO: 23.88 K/UL — HIGH (ref 3.8–10.5)

## 2017-07-18 PROCEDURE — 99233 SBSQ HOSP IP/OBS HIGH 50: CPT

## 2017-07-18 RX ORDER — ACETAMINOPHEN 500 MG
650 TABLET ORAL EVERY 6 HOURS
Qty: 0 | Refills: 0 | Status: DISCONTINUED | OUTPATIENT
Start: 2017-07-18 | End: 2017-08-05

## 2017-07-18 RX ORDER — POTASSIUM CHLORIDE 20 MEQ
10 PACKET (EA) ORAL
Qty: 0 | Refills: 0 | Status: COMPLETED | OUTPATIENT
Start: 2017-07-18 | End: 2017-07-18

## 2017-07-18 RX ORDER — TAMSULOSIN HYDROCHLORIDE 0.4 MG/1
0.4 CAPSULE ORAL AT BEDTIME
Qty: 0 | Refills: 0 | Status: DISCONTINUED | OUTPATIENT
Start: 2017-07-18 | End: 2017-07-20

## 2017-07-18 RX ADMIN — ATORVASTATIN CALCIUM 10 MILLIGRAM(S): 80 TABLET, FILM COATED ORAL at 21:19

## 2017-07-18 RX ADMIN — Medication 100 MILLIEQUIVALENT(S): at 08:49

## 2017-07-18 RX ADMIN — Medication 650 MILLIGRAM(S): at 21:20

## 2017-07-18 RX ADMIN — Medication 250 MILLIGRAM(S): at 06:00

## 2017-07-18 RX ADMIN — SODIUM CHLORIDE 50 MILLILITER(S): 9 INJECTION INTRAMUSCULAR; INTRAVENOUS; SUBCUTANEOUS at 08:49

## 2017-07-18 RX ADMIN — PIPERACILLIN AND TAZOBACTAM 25 GRAM(S): 4; .5 INJECTION, POWDER, LYOPHILIZED, FOR SOLUTION INTRAVENOUS at 06:02

## 2017-07-18 RX ADMIN — TAMSULOSIN HYDROCHLORIDE 0.4 MILLIGRAM(S): 0.4 CAPSULE ORAL at 21:26

## 2017-07-18 RX ADMIN — SODIUM CHLORIDE 50 MILLILITER(S): 9 INJECTION INTRAMUSCULAR; INTRAVENOUS; SUBCUTANEOUS at 21:26

## 2017-07-18 RX ADMIN — PIPERACILLIN AND TAZOBACTAM 25 GRAM(S): 4; .5 INJECTION, POWDER, LYOPHILIZED, FOR SOLUTION INTRAVENOUS at 21:19

## 2017-07-18 RX ADMIN — QUETIAPINE FUMARATE 25 MILLIGRAM(S): 200 TABLET, FILM COATED ORAL at 17:20

## 2017-07-18 RX ADMIN — Medication 325 MILLIGRAM(S): at 11:10

## 2017-07-18 RX ADMIN — Medication 2: at 08:52

## 2017-07-18 RX ADMIN — PIPERACILLIN AND TAZOBACTAM 25 GRAM(S): 4; .5 INJECTION, POWDER, LYOPHILIZED, FOR SOLUTION INTRAVENOUS at 14:22

## 2017-07-18 RX ADMIN — Medication 250 MILLIGRAM(S): at 21:19

## 2017-07-18 RX ADMIN — Medication 1: at 11:18

## 2017-07-18 RX ADMIN — Medication 250 MILLIGRAM(S): at 14:22

## 2017-07-18 RX ADMIN — QUETIAPINE FUMARATE 25 MILLIGRAM(S): 200 TABLET, FILM COATED ORAL at 03:23

## 2017-07-18 RX ADMIN — Medication 75 MICROGRAM(S): at 06:00

## 2017-07-18 RX ADMIN — Medication 100 MILLIEQUIVALENT(S): at 11:10

## 2017-07-18 RX ADMIN — SERTRALINE 50 MILLIGRAM(S): 25 TABLET, FILM COATED ORAL at 11:10

## 2017-07-18 RX ADMIN — Medication 100 MILLIEQUIVALENT(S): at 09:51

## 2017-07-18 NOTE — PROGRESS NOTE ADULT - SUBJECTIVE AND OBJECTIVE BOX
Progress Note    Subjective  IR placed right nephroureteral tube yesterday and underwent concomitant removal of right ureteral stent. Pain controlled. Voiding (incontinent)     Objective  Afebrile, HR: 80, BP: 121/69, SpO2: 95% (RA)  Incontinent x 3     Gen: NAD  Abd: soft, NT/ND, right NT in place and capped     Diet/Fluids  Nectar fluids (Dyphagia I), NS @ 50     Labs 7/18   WBC:  23.88 (from 24.7)  HCT: 24.4 (from 25.2)   Cr: 0.44 (from 0.47)    Antibiotics: zosyn

## 2017-07-18 NOTE — PROGRESS NOTE ADULT - PROBLEM SELECTOR PLAN 1
likely procedure related. resolved now.   s/p nephroureteral stent exchange on 7/17 by IR  good urine output

## 2017-07-18 NOTE — PROGRESS NOTE ADULT - SUBJECTIVE AND OBJECTIVE BOX
Patient is a 81y old  Female who presents with a chief complaint of Diarrhea (07 Jul 2017 10:50)      SUBJECTIVE / OVERNIGHT EVENTS:  no event. Pt is confused at baseline     MEDICATIONS  (STANDING):  sertraline 50 milliGRAM(s) Oral daily  atorvastatin 10 milliGRAM(s) Oral at bedtime  ferrous    sulfate 325 milliGRAM(s) Oral daily  levothyroxine 75 MICROGram(s) Oral daily  insulin lispro (HumaLOG) corrective regimen sliding scale   SubCutaneous three times a day before meals  insulin lispro (HumaLOG) corrective regimen sliding scale   SubCutaneous at bedtime  dextrose 5%. 1000 milliLiter(s) (50 mL/Hr) IV Continuous <Continuous>  dextrose 50% Injectable 12.5 Gram(s) IV Push once  dextrose 50% Injectable 25 Gram(s) IV Push once  dextrose 50% Injectable 25 Gram(s) IV Push once  sodium chloride 0.9%. 1000 milliLiter(s) (50 mL/Hr) IV Continuous <Continuous>  valproic  acid Syrup 250 milliGRAM(s) Oral three times a day  piperacillin/tazobactam IVPB. 3.375 Gram(s) IV Intermittent every 8 hours  tamsulosin 0.4 milliGRAM(s) Oral at bedtime    MEDICATIONS  (PRN):  dextrose Gel 1 Dose(s) Oral once PRN Blood Glucose LESS THAN 70 milliGRAM(s)/deciliter  glucagon  Injectable 1 milliGRAM(s) IntraMuscular once PRN Glucose LESS THAN 70 milligrams/deciliter  QUEtiapine 25 milliGRAM(s) Oral every 4 hours PRN For agitation  artificial  tears Solution 1 Drop(s) Both EYES four times a day PRN Dry Eyes  polyethylene glycol 3350 17 Gram(s) Oral daily PRN Constipation  acetaminophen    Suspension. 650 milliGRAM(s) Oral every 6 hours PRN Mild Pain (1 - 3) and moderate pain  bisacodyl Suppository 10 milliGRAM(s) Rectal daily PRN Constipation  acetaminophen   Tablet. 650 milliGRAM(s) Oral every 6 hours PRN Moderate Pain (4 - 6)      Vital Signs Last 24 Hrs  T(C): 36.4 (07-18-17 @ 13:44), Max: 37.6 (07-18-17 @ 05:57)  HR: 81 (07-18-17 @ 13:44) (75 - 81)  BP: 128/67 (07-18-17 @ 13:44) (117/69 - 128/67)  RR: 18 (07-18-17 @ 13:44) (16 - 18)  SpO2: 99% (07-18-17 @ 13:44) (95% - 99%)  CAPILLARY BLOOD GLUCOSE  175 (18 Jul 2017 11:17)  221 (18 Jul 2017 08:38)  177 (17 Jul 2017 22:39)  188 (17 Jul 2017 16:53)        I&O's Summary      PHYSICAL EXAM:  GENERAL: NAD, well-developed  HEAD:  Atraumatic, Normocephalic  EYES: EOMI, PERRLA  NECK: Supple, No JVD  CHEST/LUNG: Clear to auscultation bilaterally; No wheeze  HEART: Regular rate and rhythm; No murmurs, rubs, or gallops  ABDOMEN: Soft, Nontender, Nondistended; Bowel sounds present  EXTREMITIES: No clubbing, cyanosis, or edema  PSYCH: awake, alert, disoriented   NEUROLOGY: non-focal  SKIN: No rashes or lesions    LABS:                        7.9    23.88 )-----------( 769      ( 18 Jul 2017 06:40 )             24.4     07-18    139  |  102  |  11  ----------------------------<  179<H>  3.3<L>   |  28  |  0.44<L>    Ca    9.4      18 Jul 2017 06:40                RADIOLOGY & ADDITIONAL TESTS:    Imaging Personally Reviewed:    Consultant(s) Notes Reviewed:      Care Discussed with Consultants/Other Providers:

## 2017-07-18 NOTE — PROGRESS NOTE ADULT - PROBLEM SELECTOR PLAN 1
-Continue nephroureteral stent (capped per IR)   -Monitor voids and urine output   -Continue antibiotics until 7/19 per ID   -Care per primary team

## 2017-07-18 NOTE — PROGRESS NOTE ADULT - SUBJECTIVE AND OBJECTIVE BOX
ANESTHESIA POSTOP CHECK    81y Female POSTOP DAY 1 S/P Stent Removal    Vital Signs Last 24 Hrs  T(C): 37.6 (18 Jul 2017 05:57), Max: 37.6 (18 Jul 2017 05:57)  T(F): 99.6 (18 Jul 2017 05:57), Max: 99.6 (18 Jul 2017 05:57)  HR: 80 (18 Jul 2017 05:57) (75 - 80)  BP: 121/69 (18 Jul 2017 05:57) (117/69 - 125/69)  BP(mean): --  RR: 16 (18 Jul 2017 05:57) (16 - 18)  SpO2: 95% (18 Jul 2017 05:57) (95% - 95%)  I&O's Summary      [x ] NO APPARENT ANESTHESIA COMPLICATIONS      Comments:

## 2017-07-19 LAB
BUN SERPL-MCNC: 13 MG/DL — SIGNIFICANT CHANGE UP (ref 7–23)
CALCIUM SERPL-MCNC: 9.4 MG/DL — SIGNIFICANT CHANGE UP (ref 8.4–10.5)
CHLORIDE SERPL-SCNC: 99 MMOL/L — SIGNIFICANT CHANGE UP (ref 98–107)
CO2 SERPL-SCNC: 28 MMOL/L — SIGNIFICANT CHANGE UP (ref 22–31)
CREAT SERPL-MCNC: 0.47 MG/DL — LOW (ref 0.5–1.3)
GLUCOSE SERPL-MCNC: 198 MG/DL — HIGH (ref 70–99)
HCT VFR BLD CALC: 24.4 % — LOW (ref 34.5–45)
HGB BLD-MCNC: 8.1 G/DL — LOW (ref 11.5–15.5)
MCHC RBC-ENTMCNC: 28.3 PG — SIGNIFICANT CHANGE UP (ref 27–34)
MCHC RBC-ENTMCNC: 33.2 % — SIGNIFICANT CHANGE UP (ref 32–36)
MCV RBC AUTO: 85.3 FL — SIGNIFICANT CHANGE UP (ref 80–100)
NRBC # FLD: 0 — SIGNIFICANT CHANGE UP
PLATELET # BLD AUTO: 749 K/UL — HIGH (ref 150–400)
PMV BLD: 10.4 FL — SIGNIFICANT CHANGE UP (ref 7–13)
POTASSIUM SERPL-MCNC: 3.3 MMOL/L — LOW (ref 3.5–5.3)
POTASSIUM SERPL-SCNC: 3.3 MMOL/L — LOW (ref 3.5–5.3)
RBC # BLD: 2.86 M/UL — LOW (ref 3.8–5.2)
RBC # FLD: 17.9 % — HIGH (ref 10.3–14.5)
SODIUM SERPL-SCNC: 137 MMOL/L — SIGNIFICANT CHANGE UP (ref 135–145)
SPECIMEN SOURCE: SIGNIFICANT CHANGE UP
SPECIMEN SOURCE: SIGNIFICANT CHANGE UP
WBC # BLD: 22.75 K/UL — HIGH (ref 3.8–10.5)
WBC # FLD AUTO: 22.75 K/UL — HIGH (ref 3.8–10.5)

## 2017-07-19 PROCEDURE — 99233 SBSQ HOSP IP/OBS HIGH 50: CPT

## 2017-07-19 RX ORDER — POTASSIUM CHLORIDE 20 MEQ
10 PACKET (EA) ORAL
Qty: 0 | Refills: 0 | Status: COMPLETED | OUTPATIENT
Start: 2017-07-19 | End: 2017-07-19

## 2017-07-19 RX ADMIN — QUETIAPINE FUMARATE 25 MILLIGRAM(S): 200 TABLET, FILM COATED ORAL at 14:32

## 2017-07-19 RX ADMIN — Medication 1: at 17:10

## 2017-07-19 RX ADMIN — TAMSULOSIN HYDROCHLORIDE 0.4 MILLIGRAM(S): 0.4 CAPSULE ORAL at 22:51

## 2017-07-19 RX ADMIN — Medication 75 MICROGRAM(S): at 05:31

## 2017-07-19 RX ADMIN — Medication 100 MILLIEQUIVALENT(S): at 14:04

## 2017-07-19 RX ADMIN — PIPERACILLIN AND TAZOBACTAM 25 GRAM(S): 4; .5 INJECTION, POWDER, LYOPHILIZED, FOR SOLUTION INTRAVENOUS at 05:31

## 2017-07-19 RX ADMIN — Medication 650 MILLIGRAM(S): at 14:31

## 2017-07-19 RX ADMIN — ATORVASTATIN CALCIUM 10 MILLIGRAM(S): 80 TABLET, FILM COATED ORAL at 22:59

## 2017-07-19 RX ADMIN — SERTRALINE 50 MILLIGRAM(S): 25 TABLET, FILM COATED ORAL at 12:09

## 2017-07-19 RX ADMIN — QUETIAPINE FUMARATE 25 MILLIGRAM(S): 200 TABLET, FILM COATED ORAL at 23:55

## 2017-07-19 RX ADMIN — Medication 250 MILLIGRAM(S): at 14:31

## 2017-07-19 RX ADMIN — Medication 100 MILLIEQUIVALENT(S): at 14:05

## 2017-07-19 RX ADMIN — Medication 650 MILLIGRAM(S): at 23:30

## 2017-07-19 RX ADMIN — SODIUM CHLORIDE 50 MILLILITER(S): 9 INJECTION INTRAMUSCULAR; INTRAVENOUS; SUBCUTANEOUS at 05:31

## 2017-07-19 RX ADMIN — Medication 100 MILLIEQUIVALENT(S): at 12:16

## 2017-07-19 RX ADMIN — Medication 250 MILLIGRAM(S): at 22:59

## 2017-07-19 RX ADMIN — Medication 650 MILLIGRAM(S): at 15:19

## 2017-07-19 RX ADMIN — Medication 325 MILLIGRAM(S): at 12:09

## 2017-07-19 RX ADMIN — Medication 1: at 12:17

## 2017-07-19 RX ADMIN — Medication 250 MILLIGRAM(S): at 05:30

## 2017-07-19 RX ADMIN — Medication 650 MILLIGRAM(S): at 22:51

## 2017-07-19 RX ADMIN — Medication 2: at 08:57

## 2017-07-19 NOTE — PROGRESS NOTE ADULT - SUBJECTIVE AND OBJECTIVE BOX
Patient is a 81y old  Female who presents with a chief complaint of Diarrhea (07 Jul 2017 10:50)      SUBJECTIVE / OVERNIGHT EVENTS:  no event. pt is confused at baseline.     MEDICATIONS  (STANDING):  sertraline 50 milliGRAM(s) Oral daily  atorvastatin 10 milliGRAM(s) Oral at bedtime  ferrous    sulfate 325 milliGRAM(s) Oral daily  levothyroxine 75 MICROGram(s) Oral daily  insulin lispro (HumaLOG) corrective regimen sliding scale   SubCutaneous three times a day before meals  insulin lispro (HumaLOG) corrective regimen sliding scale   SubCutaneous at bedtime  dextrose 5%. 1000 milliLiter(s) (50 mL/Hr) IV Continuous <Continuous>  dextrose 50% Injectable 12.5 Gram(s) IV Push once  dextrose 50% Injectable 25 Gram(s) IV Push once  dextrose 50% Injectable 25 Gram(s) IV Push once  sodium chloride 0.9%. 1000 milliLiter(s) (50 mL/Hr) IV Continuous <Continuous>  valproic  acid Syrup 250 milliGRAM(s) Oral three times a day  tamsulosin 0.4 milliGRAM(s) Oral at bedtime    MEDICATIONS  (PRN):  dextrose Gel 1 Dose(s) Oral once PRN Blood Glucose LESS THAN 70 milliGRAM(s)/deciliter  glucagon  Injectable 1 milliGRAM(s) IntraMuscular once PRN Glucose LESS THAN 70 milligrams/deciliter  QUEtiapine 25 milliGRAM(s) Oral every 4 hours PRN For agitation  artificial  tears Solution 1 Drop(s) Both EYES four times a day PRN Dry Eyes  polyethylene glycol 3350 17 Gram(s) Oral daily PRN Constipation  acetaminophen    Suspension. 650 milliGRAM(s) Oral every 6 hours PRN Mild Pain (1 - 3) and moderate pain  bisacodyl Suppository 10 milliGRAM(s) Rectal daily PRN Constipation  acetaminophen   Tablet. 650 milliGRAM(s) Oral every 6 hours PRN Moderate Pain (4 - 6)  acetaminophen  Suppository 650 milliGRAM(s) Rectal every 6 hours PRN For Temp greater than 38 C (100.4 F)      Vital Signs Last 24 Hrs  T(C): 36.9 (07-19-17 @ 14:04), Max: 38.7 (07-18-17 @ 20:45)  HR: 47 (07-19-17 @ 14:04) (47 - 85)  BP: 127/70 (07-19-17 @ 14:04) (120/64 - 130/85)  RR: 18 (07-19-17 @ 14:04) (17 - 18)  SpO2: 95% (07-19-17 @ 05:27) (95% - 95%)  CAPILLARY BLOOD GLUCOSE  163 (19 Jul 2017 12:12)  214 (19 Jul 2017 08:41)  214 (18 Jul 2017 22:35)  145 (18 Jul 2017 16:29)        I&O's Summary    18 Jul 2017 07:01  -  19 Jul 2017 07:00  --------------------------------------------------------  IN: 800 mL / OUT: 0 mL / NET: 800 mL        PHYSICAL EXAM:  GENERAL: NAD, well-developed  HEAD:  Atraumatic, Normocephalic  EYES: EOMI, PERRLA  NECK: Supple, No JVD  CHEST/LUNG: non-labored in breathing   HEART: Regular rate and rhythm; No murmurs, rubs, or gallops  ABDOMEN: Soft, Nontender, Nondistended; Bowel sounds present  EXTREMITIES:  2+ Peripheral Pulses, No clubbing, cyanosis, or edema  PSYCH: awake, alert, calm, confused   NEUROLOGY: non-focal  SKIN: No rashes or lesions    LABS:                        8.1    22.75 )-----------( 749      ( 19 Jul 2017 06:44 )             24.4     07-19    137  |  99  |  13  ----------------------------<  198<H>  3.3<L>   |  28  |  0.47<L>    Ca    9.4      19 Jul 2017 06:44  Mg     1.8     07-19                RADIOLOGY & ADDITIONAL TESTS:    Imaging Personally Reviewed:    Consultant(s) Notes Reviewed:      Care Discussed with Consultants/Other Providers:

## 2017-07-19 NOTE — SWALLOW BEDSIDE ASSESSMENT ADULT - SWALLOW EVAL: RECOMMENDED FEEDING/EATING TECHNIQUES
oral hygiene/allow for swallow between intakes/check mouth frequently for oral residue/pocketing/position upright (90 degrees)/maintain upright posture during/after eating for 30 mins/small sips/bites/crush medication (when feasible)

## 2017-07-19 NOTE — PROGRESS NOTE ADULT - ATTENDING COMMENTS
I had a long discussion with family ( two daughters (Vera), one son, son - in - law). They understand the overall prognosis is poor. They want pursue comfort care, and no bx or any aggressive management. They agree NH with hospice.

## 2017-07-19 NOTE — SWALLOW BEDSIDE ASSESSMENT ADULT - ORAL PREPARATORY PHASE
intermittent anterior loss likely exacerbated by when patient declines/refuses to continue oral intake
Within functional limits

## 2017-07-19 NOTE — SWALLOW BEDSIDE ASSESSMENT ADULT - SWALLOW EVAL: DIAGNOSIS
Patient presents with functional oral and pharyngeal stage swallowing mechanism characterized by adequate oral containment, adequate bolus manipulation and transport with adequate oral clearance. There is laryngeal elevation upon palpation, initiation of the pharyngeal swallow. There were no overt signs of impaired airway protection for nectar thick liquids and thin liquids. Of Note: Patient expectorated when she did not like taste of an item. Patient presents with functional oral and pharyngeal stage swallowing mechanism characterized by adequate oral containment, adequate bolus manipulation and transport with adequate oral clearance. There is laryngeal elevation upon palpation, initiation of the pharyngeal swallow. There were no overt signs of impaired airway protection for nectar thick liquids and thin liquids. Although there were no overt signs of impaired airway protection, suggest continuing with dysphagia diet of puree and nectar thick liquids.  Of Note: Patient expectorated when she did not like taste of an item.

## 2017-07-19 NOTE — SWALLOW BEDSIDE ASSESSMENT ADULT - COMMENTS
80 y/o F with HTN, DM, advanced dementia, DVT on Eliquis, hypothyroidism, hydronephrosis 2/2 R renal pelvis mass s/p nephrostomy tube p/w diarrhea.  Pt's daughter reports that about 2 weeks ago, pt was treated for UTI with Levaquin and Diflucan.  4 Days ago, she developed green, watery diarrhea.  3 days ago, pt's daughter gave Imodium with resolution of diarrhea yesterday.  Diarrhea again returned today, and daughter gave Imodium this AM.  Pt had low grade temp.  No indication of abdominal pain per daughter.   No sick contacts or recent travel.    Of Note: Patient was seen on 7/6/17 for a clinical swallow evaluation (See Consult for details) with recommendations for puree and thin liquids. However, diet changed to Puree with Nectar Thick Liquids given coughing with thin liquids noted.     Patient seen at bedside. Patient's daughter (Nga) is present.  Patient requires feeding assistance for oral intake. Daughter reports that she concern that her mother does not take enough nutrition. Patient's daughter feels that the nurse gave her thin liquid when she was not in alert state at that time when she had the coughing episode.

## 2017-07-19 NOTE — PROGRESS NOTE ADULT - PROBLEM SELECTOR PLAN 1
-Continue nephroureteral stent (capped per IR)   -Monitor voids and urine output   -D/c antibiotics as per ID  -Care per primary team -Continue nephroureteral stent (capped per IR)   -Monitor voids and urine output   -D/c antibiotics as per ID  -Care per primary team  -Will sign off

## 2017-07-19 NOTE — SWALLOW BEDSIDE ASSESSMENT ADULT - ASR SWALLOW REFERRAL
Registered Dietitian/given daughter concerns Registered Dietitian/given daughter concerns of calories intake

## 2017-07-19 NOTE — PROGRESS NOTE ADULT - SUBJECTIVE AND OBJECTIVE BOX
Subjective  Pt febrile to     T(F): , Max: 101.6 (07-18-17 @ 20:45)  HR: 65 (07-19-17 @ 17:12)  BP: 127/70 (07-19-17 @ 14:04)  SpO2: 93% (07-19-17 @ 17:12)  Wt(kg): --    Output     07-18 @ 07:01  -  07-19 @ 07:00  --------------------------------------------------------  IN: 800 mL / OUT: 0 mL / NET: 800 mL        Gen  Abd      Labs      07-19 @ 06:44    WBC 22.75 / Hct 24.4  / SCr 0.47     07-18 @ 06:40    WBC 23.88 / Hct 24.4  / SCr 0.44           RADIOLOGY RESULTS: Subjective  Pt febrile to 101.6 overnight, leukocytosis remains stable.  Pt's family notes that she seems to be in pain. Jase dry on exam today, but family notes that the urine has been a light red color.    T(F): , Max: 101.6 (07-18-17 @ 20:45)  HR: 65 (07-19-17 @ 17:12)  BP: 127/70 (07-19-17 @ 14:04)  SpO2: 93% (07-19-17 @ 17:12)    Gen NAD, confused  Abd soft, NT, ND   R NT in place, capped    Labs  07-19 @ 06:44  WBC 22.75 / Hct 24.4  / SCr 0.47     07-18 @ 06:40  WBC 23.88 / Hct 24.4  / SCr 0.44

## 2017-07-20 LAB
BUN SERPL-MCNC: 11 MG/DL — SIGNIFICANT CHANGE UP (ref 7–23)
CALCIUM SERPL-MCNC: 9.5 MG/DL — SIGNIFICANT CHANGE UP (ref 8.4–10.5)
CHLORIDE SERPL-SCNC: 101 MMOL/L — SIGNIFICANT CHANGE UP (ref 98–107)
CO2 SERPL-SCNC: 27 MMOL/L — SIGNIFICANT CHANGE UP (ref 22–31)
CREAT SERPL-MCNC: 0.42 MG/DL — LOW (ref 0.5–1.3)
GLUCOSE SERPL-MCNC: 165 MG/DL — HIGH (ref 70–99)
HCT VFR BLD CALC: 24.3 % — LOW (ref 34.5–45)
HGB BLD-MCNC: 8.1 G/DL — LOW (ref 11.5–15.5)
MAGNESIUM SERPL-MCNC: 1.7 MG/DL — SIGNIFICANT CHANGE UP (ref 1.6–2.6)
MCHC RBC-ENTMCNC: 28.5 PG — SIGNIFICANT CHANGE UP (ref 27–34)
MCHC RBC-ENTMCNC: 33.3 % — SIGNIFICANT CHANGE UP (ref 32–36)
MCV RBC AUTO: 85.6 FL — SIGNIFICANT CHANGE UP (ref 80–100)
NRBC # FLD: 0 — SIGNIFICANT CHANGE UP
PHOSPHATE SERPL-MCNC: 2.4 MG/DL — LOW (ref 2.5–4.5)
PLATELET # BLD AUTO: 798 K/UL — HIGH (ref 150–400)
PMV BLD: 10.8 FL — SIGNIFICANT CHANGE UP (ref 7–13)
POTASSIUM SERPL-MCNC: 3.3 MMOL/L — LOW (ref 3.5–5.3)
POTASSIUM SERPL-SCNC: 3.3 MMOL/L — LOW (ref 3.5–5.3)
RBC # BLD: 2.84 M/UL — LOW (ref 3.8–5.2)
RBC # FLD: 18.3 % — HIGH (ref 10.3–14.5)
SODIUM SERPL-SCNC: 140 MMOL/L — SIGNIFICANT CHANGE UP (ref 135–145)
WBC # BLD: 24.73 K/UL — HIGH (ref 3.8–10.5)
WBC # FLD AUTO: 24.73 K/UL — HIGH (ref 3.8–10.5)

## 2017-07-20 PROCEDURE — 99232 SBSQ HOSP IP/OBS MODERATE 35: CPT

## 2017-07-20 RX ORDER — POTASSIUM CHLORIDE 20 MEQ
10 PACKET (EA) ORAL
Qty: 0 | Refills: 0 | Status: COMPLETED | OUTPATIENT
Start: 2017-07-20 | End: 2017-07-20

## 2017-07-20 RX ORDER — DOXAZOSIN MESYLATE 4 MG
1 TABLET ORAL AT BEDTIME
Qty: 0 | Refills: 0 | Status: DISCONTINUED | OUTPATIENT
Start: 2017-07-20 | End: 2017-08-05

## 2017-07-20 RX ADMIN — Medication 1: at 09:09

## 2017-07-20 RX ADMIN — Medication 75 MICROGRAM(S): at 06:28

## 2017-07-20 RX ADMIN — Medication 250 MILLIGRAM(S): at 14:59

## 2017-07-20 RX ADMIN — SODIUM CHLORIDE 50 MILLILITER(S): 9 INJECTION INTRAMUSCULAR; INTRAVENOUS; SUBCUTANEOUS at 14:59

## 2017-07-20 RX ADMIN — Medication 100 MILLIEQUIVALENT(S): at 10:28

## 2017-07-20 RX ADMIN — Medication 100 MILLIEQUIVALENT(S): at 11:19

## 2017-07-20 RX ADMIN — SERTRALINE 50 MILLIGRAM(S): 25 TABLET, FILM COATED ORAL at 11:19

## 2017-07-20 RX ADMIN — Medication 1: at 11:52

## 2017-07-20 RX ADMIN — QUETIAPINE FUMARATE 25 MILLIGRAM(S): 200 TABLET, FILM COATED ORAL at 14:59

## 2017-07-20 RX ADMIN — Medication 650 MILLIGRAM(S): at 22:35

## 2017-07-20 RX ADMIN — Medication 325 MILLIGRAM(S): at 11:19

## 2017-07-20 RX ADMIN — Medication 1 MILLIGRAM(S): at 22:36

## 2017-07-20 RX ADMIN — ATORVASTATIN CALCIUM 10 MILLIGRAM(S): 80 TABLET, FILM COATED ORAL at 22:36

## 2017-07-20 RX ADMIN — Medication 250 MILLIGRAM(S): at 22:36

## 2017-07-20 RX ADMIN — Medication 650 MILLIGRAM(S): at 23:30

## 2017-07-20 RX ADMIN — Medication 100 MILLIEQUIVALENT(S): at 09:25

## 2017-07-20 RX ADMIN — Medication 250 MILLIGRAM(S): at 06:28

## 2017-07-20 RX ADMIN — SODIUM CHLORIDE 50 MILLILITER(S): 9 INJECTION INTRAMUSCULAR; INTRAVENOUS; SUBCUTANEOUS at 00:00

## 2017-07-20 NOTE — PROGRESS NOTE ADULT - PROBLEM SELECTOR PLAN 1
likely procedure related. resolved now.   s/p nephroureteral stent exchange and NT capped on 7/17 by IR  good urine output

## 2017-07-20 NOTE — PROGRESS NOTE ADULT - SUBJECTIVE AND OBJECTIVE BOX
Patient is a 81y old  Female who presents with a chief complaint of Diarrhea (07 Jul 2017 10:50)      SUBJECTIVE / OVERNIGHT EVENTS:  no event. Pt confused at baseline     MEDICATIONS  (STANDING):  sertraline 50 milliGRAM(s) Oral daily  atorvastatin 10 milliGRAM(s) Oral at bedtime  ferrous    sulfate 325 milliGRAM(s) Oral daily  levothyroxine 75 MICROGram(s) Oral daily  insulin lispro (HumaLOG) corrective regimen sliding scale   SubCutaneous three times a day before meals  insulin lispro (HumaLOG) corrective regimen sliding scale   SubCutaneous at bedtime  dextrose 5%. 1000 milliLiter(s) (50 mL/Hr) IV Continuous <Continuous>  dextrose 50% Injectable 12.5 Gram(s) IV Push once  dextrose 50% Injectable 25 Gram(s) IV Push once  dextrose 50% Injectable 25 Gram(s) IV Push once  sodium chloride 0.9%. 1000 milliLiter(s) (50 mL/Hr) IV Continuous <Continuous>  valproic  acid Syrup 250 milliGRAM(s) Oral three times a day  tamsulosin 0.4 milliGRAM(s) Oral at bedtime    MEDICATIONS  (PRN):  dextrose Gel 1 Dose(s) Oral once PRN Blood Glucose LESS THAN 70 milliGRAM(s)/deciliter  glucagon  Injectable 1 milliGRAM(s) IntraMuscular once PRN Glucose LESS THAN 70 milligrams/deciliter  QUEtiapine 25 milliGRAM(s) Oral every 4 hours PRN For agitation  artificial  tears Solution 1 Drop(s) Both EYES four times a day PRN Dry Eyes  polyethylene glycol 3350 17 Gram(s) Oral daily PRN Constipation  acetaminophen    Suspension. 650 milliGRAM(s) Oral every 6 hours PRN Mild Pain (1 - 3) and moderate pain  bisacodyl Suppository 10 milliGRAM(s) Rectal daily PRN Constipation  acetaminophen   Tablet. 650 milliGRAM(s) Oral every 6 hours PRN Moderate Pain (4 - 6)  acetaminophen  Suppository 650 milliGRAM(s) Rectal every 6 hours PRN For Temp greater than 38 C (100.4 F)      Vital Signs Last 24 Hrs  T(C): 36.8 (07-20-17 @ 13:30), Max: 37.2 (07-19-17 @ 22:44)  HR: 80 (07-20-17 @ 13:30) (65 - 80)  BP: 112/66 (07-20-17 @ 13:30) (112/66 - 137/71)  RR: 18 (07-20-17 @ 13:30) (18 - 18)  SpO2: 99% (07-20-17 @ 13:30) (93% - 99%)  CAPILLARY BLOOD GLUCOSE  153 (20 Jul 2017 11:49)  176 (20 Jul 2017 08:35)  151 (19 Jul 2017 22:44)  164 (19 Jul 2017 16:45)        I&O's Summary      PHYSICAL EXAM:  GENERAL: NAD  HEAD:  Atraumatic, Normocephalic  NECK: Supple, No JVD  CHEST/LUNG: Clear to auscultation bilaterally; No wheeze  HEART: Regular rate and rhythm; No murmurs, rubs, or gallops  ABDOMEN: Soft, Nontender, Nondistended; Bowel sounds present  EXTREMITIES:  2+ Peripheral Pulses, No clubbing, cyanosis, or edema  PSYCH: awake, alert, confused   NEUROLOGY: non-focal  SKIN: No rashes or lesions    LABS:                        8.1    24.73 )-----------( 798      ( 20 Jul 2017 06:12 )             24.3     07-20    140  |  101  |  11  ----------------------------<  165<H>  3.3<L>   |  27  |  0.42<L>    Ca    9.5      20 Jul 2017 06:12  Phos  2.4     07-20  Mg     1.7     07-20                RADIOLOGY & ADDITIONAL TESTS:    Imaging Personally Reviewed:    Consultant(s) Notes Reviewed:      Care Discussed with Consultants/Other Providers:

## 2017-07-21 PROCEDURE — 99232 SBSQ HOSP IP/OBS MODERATE 35: CPT

## 2017-07-21 RX ORDER — SODIUM,POTASSIUM PHOSPHATES 278-250MG
1 POWDER IN PACKET (EA) ORAL ONCE
Qty: 0 | Refills: 0 | Status: DISCONTINUED | OUTPATIENT
Start: 2017-07-21 | End: 2017-07-21

## 2017-07-21 RX ADMIN — Medication 650 MILLIGRAM(S): at 17:30

## 2017-07-21 RX ADMIN — ATORVASTATIN CALCIUM 10 MILLIGRAM(S): 80 TABLET, FILM COATED ORAL at 22:00

## 2017-07-21 RX ADMIN — Medication 325 MILLIGRAM(S): at 13:17

## 2017-07-21 RX ADMIN — Medication 250 MILLIGRAM(S): at 07:26

## 2017-07-21 RX ADMIN — Medication 1 MILLIGRAM(S): at 23:23

## 2017-07-21 RX ADMIN — Medication 2: at 09:07

## 2017-07-21 RX ADMIN — SERTRALINE 50 MILLIGRAM(S): 25 TABLET, FILM COATED ORAL at 13:17

## 2017-07-21 RX ADMIN — SODIUM CHLORIDE 50 MILLILITER(S): 9 INJECTION INTRAMUSCULAR; INTRAVENOUS; SUBCUTANEOUS at 09:07

## 2017-07-21 RX ADMIN — Medication 75 MICROGRAM(S): at 06:17

## 2017-07-21 RX ADMIN — Medication 650 MILLIGRAM(S): at 16:42

## 2017-07-21 RX ADMIN — Medication 1: at 13:16

## 2017-07-21 RX ADMIN — Medication 250 MILLIGRAM(S): at 13:56

## 2017-07-21 RX ADMIN — Medication 250 MILLIGRAM(S): at 22:01

## 2017-07-21 NOTE — PROGRESS NOTE ADULT - ATTENDING COMMENTS
d/w daughter at bedside. Now daughter wants to bring patient home with hospice rather than nursing home with hospice

## 2017-07-21 NOTE — PROGRESS NOTE ADULT - SUBJECTIVE AND OBJECTIVE BOX
Patient is a 81y old  Female who presents with a chief complaint of Diarrhea (07 Jul 2017 10:50)      SUBJECTIVE / OVERNIGHT EVENTS:  no event. Pt is confused.     MEDICATIONS  (STANDING):  sertraline 50 milliGRAM(s) Oral daily  atorvastatin 10 milliGRAM(s) Oral at bedtime  ferrous    sulfate 325 milliGRAM(s) Oral daily  levothyroxine 75 MICROGram(s) Oral daily  insulin lispro (HumaLOG) corrective regimen sliding scale   SubCutaneous three times a day before meals  insulin lispro (HumaLOG) corrective regimen sliding scale   SubCutaneous at bedtime  dextrose 5%. 1000 milliLiter(s) (50 mL/Hr) IV Continuous <Continuous>  dextrose 50% Injectable 12.5 Gram(s) IV Push once  dextrose 50% Injectable 25 Gram(s) IV Push once  dextrose 50% Injectable 25 Gram(s) IV Push once  sodium chloride 0.9%. 1000 milliLiter(s) (50 mL/Hr) IV Continuous <Continuous>  valproic  acid Syrup 250 milliGRAM(s) Oral three times a day  doxazosin 1 milliGRAM(s) Oral at bedtime    MEDICATIONS  (PRN):  dextrose Gel 1 Dose(s) Oral once PRN Blood Glucose LESS THAN 70 milliGRAM(s)/deciliter  glucagon  Injectable 1 milliGRAM(s) IntraMuscular once PRN Glucose LESS THAN 70 milligrams/deciliter  QUEtiapine 25 milliGRAM(s) Oral every 4 hours PRN For agitation  artificial  tears Solution 1 Drop(s) Both EYES four times a day PRN Dry Eyes  polyethylene glycol 3350 17 Gram(s) Oral daily PRN Constipation  acetaminophen    Suspension. 650 milliGRAM(s) Oral every 6 hours PRN Mild Pain (1 - 3) and moderate pain  bisacodyl Suppository 10 milliGRAM(s) Rectal daily PRN Constipation  acetaminophen   Tablet. 650 milliGRAM(s) Oral every 6 hours PRN Moderate Pain (4 - 6)  acetaminophen  Suppository 650 milliGRAM(s) Rectal every 6 hours PRN For Temp greater than 38 C (100.4 F)        CAPILLARY BLOOD GLUCOSE  176 (21 Jul 2017 12:11)  203 (21 Jul 2017 09:03)  168 (20 Jul 2017 22:55)  142 (20 Jul 2017 16:23)        I&O's Summary      PHYSICAL EXAM:  GENERAL: NAD  HEAD:  Atraumatic, Normocephalic  EYES: EOMI, PERRLA  NECK: Supple, No JVD  CHEST/LUNG: Clear to auscultation bilaterally; No wheeze  HEART: Regular rate and rhythm; No murmurs, rubs, or gallops  ABDOMEN: Soft, Nondistended; Bowel sounds present  EXTREMITIES:  No clubbing, cyanosis, or edema  PSYCH: awake, alert, confused   NEUROLOGY: non-focal  SKIN: No rashes or lesions    LABS:                        8.1    24.73 )-----------( 798      ( 20 Jul 2017 06:12 )             24.3     07-20    140  |  101  |  11  ----------------------------<  165<H>  3.3<L>   |  27  |  0.42<L>    Ca    9.5      20 Jul 2017 06:12  Phos  2.4     07-20  Mg     1.7     07-20                RADIOLOGY & ADDITIONAL TESTS:    Imaging Personally Reviewed:    Consultant(s) Notes Reviewed:      Care Discussed with Consultants/Other Providers:

## 2017-07-22 PROCEDURE — 99232 SBSQ HOSP IP/OBS MODERATE 35: CPT

## 2017-07-22 RX ADMIN — Medication 1: at 09:23

## 2017-07-22 RX ADMIN — Medication 75 MICROGRAM(S): at 06:08

## 2017-07-22 RX ADMIN — SODIUM CHLORIDE 50 MILLILITER(S): 9 INJECTION INTRAMUSCULAR; INTRAVENOUS; SUBCUTANEOUS at 06:09

## 2017-07-22 RX ADMIN — Medication 250 MILLIGRAM(S): at 15:03

## 2017-07-22 RX ADMIN — SODIUM CHLORIDE 50 MILLILITER(S): 9 INJECTION INTRAMUSCULAR; INTRAVENOUS; SUBCUTANEOUS at 23:58

## 2017-07-22 RX ADMIN — Medication 250 MILLIGRAM(S): at 06:09

## 2017-07-22 RX ADMIN — Medication 325 MILLIGRAM(S): at 12:30

## 2017-07-22 RX ADMIN — Medication 1: at 18:13

## 2017-07-22 RX ADMIN — Medication 250 MILLIGRAM(S): at 22:17

## 2017-07-22 RX ADMIN — SERTRALINE 50 MILLIGRAM(S): 25 TABLET, FILM COATED ORAL at 12:30

## 2017-07-22 RX ADMIN — ATORVASTATIN CALCIUM 10 MILLIGRAM(S): 80 TABLET, FILM COATED ORAL at 22:17

## 2017-07-22 RX ADMIN — Medication 1 MILLIGRAM(S): at 23:57

## 2017-07-22 NOTE — PROGRESS NOTE ADULT - ATTENDING COMMENTS
I have extensive discussions about GOC on the daily basis with her two daughters and sons. The daughters want to bring her home with hospice and declined nursing home. They understand pt has a very poor prognosis

## 2017-07-22 NOTE — PROGRESS NOTE ADULT - SUBJECTIVE AND OBJECTIVE BOX
Patient is a 81y old  Female who presents with a chief complaint of Diarrhea (07 Jul 2017 10:50)      SUBJECTIVE / OVERNIGHT EVENTS:  Pt is confused as baseline. No event.     MEDICATIONS  (STANDING):  sertraline 50 milliGRAM(s) Oral daily  atorvastatin 10 milliGRAM(s) Oral at bedtime  ferrous    sulfate 325 milliGRAM(s) Oral daily  levothyroxine 75 MICROGram(s) Oral daily  insulin lispro (HumaLOG) corrective regimen sliding scale   SubCutaneous three times a day before meals  insulin lispro (HumaLOG) corrective regimen sliding scale   SubCutaneous at bedtime  dextrose 5%. 1000 milliLiter(s) (50 mL/Hr) IV Continuous <Continuous>  dextrose 50% Injectable 12.5 Gram(s) IV Push once  dextrose 50% Injectable 25 Gram(s) IV Push once  dextrose 50% Injectable 25 Gram(s) IV Push once  sodium chloride 0.9%. 1000 milliLiter(s) (50 mL/Hr) IV Continuous <Continuous>  valproic  acid Syrup 250 milliGRAM(s) Oral three times a day  doxazosin 1 milliGRAM(s) Oral at bedtime    MEDICATIONS  (PRN):  dextrose Gel 1 Dose(s) Oral once PRN Blood Glucose LESS THAN 70 milliGRAM(s)/deciliter  glucagon  Injectable 1 milliGRAM(s) IntraMuscular once PRN Glucose LESS THAN 70 milligrams/deciliter  QUEtiapine 25 milliGRAM(s) Oral every 4 hours PRN For agitation  artificial  tears Solution 1 Drop(s) Both EYES four times a day PRN Dry Eyes  polyethylene glycol 3350 17 Gram(s) Oral daily PRN Constipation  acetaminophen    Suspension. 650 milliGRAM(s) Oral every 6 hours PRN Mild Pain (1 - 3) and moderate pain  bisacodyl Suppository 10 milliGRAM(s) Rectal daily PRN Constipation  acetaminophen   Tablet. 650 milliGRAM(s) Oral every 6 hours PRN Moderate Pain (4 - 6)  acetaminophen  Suppository 650 milliGRAM(s) Rectal every 6 hours PRN For Temp greater than 38 C (100.4 F)      T(C): 36.4 (07-22-17 @ 13:48), Max: 37 (07-21-17 @ 22:08)  HR: 76 (07-22-17 @ 13:48) (76 - 90)  BP: 125/66 (07-22-17 @ 13:48) (125/66 - 134/68)  RR: 17 (07-22-17 @ 13:48) (17 - 20)  SpO2: 100% (07-22-17 @ 13:48) (98% - 100%)  CAPILLARY BLOOD GLUCOSE  140 (22 Jul 2017 11:58)  190 (22 Jul 2017 08:26)  156 (21 Jul 2017 22:23)  205 (21 Jul 2017 17:00)        I&O's Summary    22 Jul 2017 07:01  -  22 Jul 2017 16:02  --------------------------------------------------------  IN: 600 mL / OUT: 0 mL / NET: 600 mL        PHYSICAL EXAM:  GENERAL: NAD  HEAD:  Atraumatic, Normocephalic  EYES: EOMI, PERRLA, conjunctiva and sclera clear  NECK: Supple, No JVD  CHEST/LUNG: Clear to auscultation bilaterally; No wheeze  HEART: s1 s2, regular rhythm and rate   ABDOMEN: Soft, Nontender, Nondistended; Bowel sounds present  EXTREMITIES:  2+ Peripheral Pulses, No clubbing, cyanosis, or edema  PSYCH: awake, alert  NEUROLOGY: non-focal, confused   SKIN: No rashes or lesions    LABS:                    RADIOLOGY & ADDITIONAL TESTS:    Imaging Personally Reviewed:    Consultant(s) Notes Reviewed:      Care Discussed with Consultants/Other Providers:

## 2017-07-23 LAB
BACTERIA BLD CULT: SIGNIFICANT CHANGE UP
BACTERIA BLD CULT: SIGNIFICANT CHANGE UP
BUN SERPL-MCNC: 10 MG/DL — SIGNIFICANT CHANGE UP (ref 7–23)
BUN SERPL-MCNC: 10 MG/DL — SIGNIFICANT CHANGE UP (ref 7–23)
CALCIUM SERPL-MCNC: 9.3 MG/DL — SIGNIFICANT CHANGE UP (ref 8.4–10.5)
CALCIUM SERPL-MCNC: 9.6 MG/DL — SIGNIFICANT CHANGE UP (ref 8.4–10.5)
CHLORIDE SERPL-SCNC: 100 MMOL/L — SIGNIFICANT CHANGE UP (ref 98–107)
CHLORIDE SERPL-SCNC: 101 MMOL/L — SIGNIFICANT CHANGE UP (ref 98–107)
CO2 SERPL-SCNC: 29 MMOL/L — SIGNIFICANT CHANGE UP (ref 22–31)
CO2 SERPL-SCNC: 29 MMOL/L — SIGNIFICANT CHANGE UP (ref 22–31)
CREAT SERPL-MCNC: 0.36 MG/DL — LOW (ref 0.5–1.3)
CREAT SERPL-MCNC: 0.38 MG/DL — LOW (ref 0.5–1.3)
GLUCOSE SERPL-MCNC: 143 MG/DL — HIGH (ref 70–99)
GLUCOSE SERPL-MCNC: 166 MG/DL — HIGH (ref 70–99)
HCT VFR BLD CALC: 25.2 % — LOW (ref 34.5–45)
HGB BLD-MCNC: 7.8 G/DL — LOW (ref 11.5–15.5)
MAGNESIUM SERPL-MCNC: 1.8 MG/DL — SIGNIFICANT CHANGE UP (ref 1.6–2.6)
MAGNESIUM SERPL-MCNC: 1.8 MG/DL — SIGNIFICANT CHANGE UP (ref 1.6–2.6)
MCHC RBC-ENTMCNC: 27 PG — SIGNIFICANT CHANGE UP (ref 27–34)
MCHC RBC-ENTMCNC: 31 % — LOW (ref 32–36)
MCV RBC AUTO: 87.2 FL — SIGNIFICANT CHANGE UP (ref 80–100)
NRBC # FLD: 0 — SIGNIFICANT CHANGE UP
PHOSPHATE SERPL-MCNC: 2.2 MG/DL — LOW (ref 2.5–4.5)
PHOSPHATE SERPL-MCNC: 2.4 MG/DL — LOW (ref 2.5–4.5)
PLATELET # BLD AUTO: 892 K/UL — HIGH (ref 150–400)
PMV BLD: 11 FL — SIGNIFICANT CHANGE UP (ref 7–13)
POTASSIUM SERPL-MCNC: 2.8 MMOL/L — CRITICAL LOW (ref 3.5–5.3)
POTASSIUM SERPL-MCNC: 3.6 MMOL/L — SIGNIFICANT CHANGE UP (ref 3.5–5.3)
POTASSIUM SERPL-SCNC: 2.8 MMOL/L — CRITICAL LOW (ref 3.5–5.3)
POTASSIUM SERPL-SCNC: 3.6 MMOL/L — SIGNIFICANT CHANGE UP (ref 3.5–5.3)
RBC # BLD: 2.89 M/UL — LOW (ref 3.8–5.2)
RBC # FLD: 18.9 % — HIGH (ref 10.3–14.5)
SODIUM SERPL-SCNC: 141 MMOL/L — SIGNIFICANT CHANGE UP (ref 135–145)
SODIUM SERPL-SCNC: 142 MMOL/L — SIGNIFICANT CHANGE UP (ref 135–145)
WBC # BLD: 25.74 K/UL — HIGH (ref 3.8–10.5)
WBC # FLD AUTO: 25.74 K/UL — HIGH (ref 3.8–10.5)

## 2017-07-23 PROCEDURE — 99232 SBSQ HOSP IP/OBS MODERATE 35: CPT

## 2017-07-23 RX ORDER — POTASSIUM CHLORIDE 20 MEQ
40 PACKET (EA) ORAL
Qty: 0 | Refills: 0 | Status: DISCONTINUED | OUTPATIENT
Start: 2017-07-23 | End: 2017-07-23

## 2017-07-23 RX ORDER — POTASSIUM CHLORIDE 20 MEQ
10 PACKET (EA) ORAL
Qty: 0 | Refills: 0 | Status: COMPLETED | OUTPATIENT
Start: 2017-07-23 | End: 2017-07-23

## 2017-07-23 RX ORDER — POTASSIUM CHLORIDE 20 MEQ
40 PACKET (EA) ORAL
Qty: 0 | Refills: 0 | Status: COMPLETED | OUTPATIENT
Start: 2017-07-23 | End: 2017-07-24

## 2017-07-23 RX ADMIN — SODIUM CHLORIDE 50 MILLILITER(S): 9 INJECTION INTRAMUSCULAR; INTRAVENOUS; SUBCUTANEOUS at 15:16

## 2017-07-23 RX ADMIN — Medication 100 MILLIEQUIVALENT(S): at 10:07

## 2017-07-23 RX ADMIN — Medication 325 MILLIGRAM(S): at 11:03

## 2017-07-23 RX ADMIN — Medication 250 MILLIGRAM(S): at 15:15

## 2017-07-23 RX ADMIN — Medication 100 MILLIEQUIVALENT(S): at 11:01

## 2017-07-23 RX ADMIN — Medication 75 MICROGRAM(S): at 06:05

## 2017-07-23 RX ADMIN — Medication 1: at 12:04

## 2017-07-23 RX ADMIN — Medication 1 MILLIGRAM(S): at 22:10

## 2017-07-23 RX ADMIN — Medication 1: at 09:11

## 2017-07-23 RX ADMIN — SERTRALINE 50 MILLIGRAM(S): 25 TABLET, FILM COATED ORAL at 11:04

## 2017-07-23 RX ADMIN — Medication 40 MILLIEQUIVALENT(S): at 22:10

## 2017-07-23 RX ADMIN — Medication 250 MILLIGRAM(S): at 22:10

## 2017-07-23 RX ADMIN — Medication 250 MILLIGRAM(S): at 06:05

## 2017-07-23 NOTE — PROGRESS NOTE ADULT - ATTENDING COMMENTS
I d/w pt's sons at bedside extensively. They understand the prognosis is very poor and want to focus on comfort.

## 2017-07-23 NOTE — PROGRESS NOTE ADULT - SUBJECTIVE AND OBJECTIVE BOX
Patient is a 81y old  Female who presents with a chief complaint of Diarrhea (07 Jul 2017 10:50)      SUBJECTIVE / OVERNIGHT EVENTS:  No event. Pt is confused as baseline     MEDICATIONS  (STANDING):  sertraline 50 milliGRAM(s) Oral daily  atorvastatin 10 milliGRAM(s) Oral at bedtime  ferrous    sulfate 325 milliGRAM(s) Oral daily  levothyroxine 75 MICROGram(s) Oral daily  insulin lispro (HumaLOG) corrective regimen sliding scale   SubCutaneous three times a day before meals  insulin lispro (HumaLOG) corrective regimen sliding scale   SubCutaneous at bedtime  dextrose 5%. 1000 milliLiter(s) (50 mL/Hr) IV Continuous <Continuous>  dextrose 50% Injectable 12.5 Gram(s) IV Push once  dextrose 50% Injectable 25 Gram(s) IV Push once  dextrose 50% Injectable 25 Gram(s) IV Push once  sodium chloride 0.9%. 1000 milliLiter(s) (50 mL/Hr) IV Continuous <Continuous>  valproic  acid Syrup 250 milliGRAM(s) Oral three times a day  doxazosin 1 milliGRAM(s) Oral at bedtime  potassium chloride   Powder 40 milliEquivalent(s) Oral two times a day    MEDICATIONS  (PRN):  dextrose Gel 1 Dose(s) Oral once PRN Blood Glucose LESS THAN 70 milliGRAM(s)/deciliter  glucagon  Injectable 1 milliGRAM(s) IntraMuscular once PRN Glucose LESS THAN 70 milligrams/deciliter  QUEtiapine 25 milliGRAM(s) Oral every 4 hours PRN For agitation  artificial  tears Solution 1 Drop(s) Both EYES four times a day PRN Dry Eyes  polyethylene glycol 3350 17 Gram(s) Oral daily PRN Constipation  acetaminophen    Suspension. 650 milliGRAM(s) Oral every 6 hours PRN Mild Pain (1 - 3) and moderate pain  bisacodyl Suppository 10 milliGRAM(s) Rectal daily PRN Constipation  acetaminophen   Tablet. 650 milliGRAM(s) Oral every 6 hours PRN Moderate Pain (4 - 6)  acetaminophen  Suppository 650 milliGRAM(s) Rectal every 6 hours PRN For Temp greater than 38 C (100.4 F)      T(C): 37.1 (07-23-17 @ 14:32), Max: 37.1 (07-22-17 @ 22:00)  HR: 80 (07-23-17 @ 14:32) (74 - 80)  BP: 137/70 (07-23-17 @ 14:32) (126/70 - 137/70)  RR: 20 (07-23-17 @ 14:32) (18 - 20)  SpO2: 91% (07-23-17 @ 14:32) (91% - 100%)  CAPILLARY BLOOD GLUCOSE  168 (23 Jul 2017 11:56)  184 (23 Jul 2017 08:44)        I&O's Summary    22 Jul 2017 07:01  -  23 Jul 2017 07:00  --------------------------------------------------------  IN: 600 mL / OUT: 0 mL / NET: 600 mL    23 Jul 2017 07:01  -  23 Jul 2017 16:53  --------------------------------------------------------  IN: 600 mL / OUT: 0 mL / NET: 600 mL        PHYSICAL EXAM:  GENERAL: NAD, well-developed  HEAD:  Atraumatic, Normocephalic  NECK: No JVD  CHEST/LUNG: good air movement. Non-labored in breathing; No wheeze  HEART: s1 s2, regular rhythm and rate   ABDOMEN: Soft, Nondistended; Bowel sounds present  EXTREMITIES:  No clubbing, cyanosis, or edema  PSYCH: calm   NEUROLOGY: awake, confused       LABS:                        7.8    25.74 )-----------( 892      ( 23 Jul 2017 05:51 )             25.2     07-23    142  |  100  |  10  ----------------------------<  166<H>  2.8<LL>   |  29  |  0.38<L>    Ca    9.3      23 Jul 2017 05:51  Phos  2.4     07-23  Mg     1.8     07-23                RADIOLOGY & ADDITIONAL TESTS:    Imaging Personally Reviewed:    Consultant(s) Notes Reviewed:      Care Discussed with Consultants/Other Providers:

## 2017-07-24 LAB
BLD GP AB SCN SERPL QL: NEGATIVE — SIGNIFICANT CHANGE UP
BUN SERPL-MCNC: 11 MG/DL — SIGNIFICANT CHANGE UP (ref 7–23)
CALCIUM SERPL-MCNC: 9.5 MG/DL — SIGNIFICANT CHANGE UP (ref 8.4–10.5)
CHLORIDE SERPL-SCNC: 103 MMOL/L — SIGNIFICANT CHANGE UP (ref 98–107)
CO2 SERPL-SCNC: 30 MMOL/L — SIGNIFICANT CHANGE UP (ref 22–31)
CREAT SERPL-MCNC: 0.36 MG/DL — LOW (ref 0.5–1.3)
GLUCOSE SERPL-MCNC: 165 MG/DL — HIGH (ref 70–99)
MAGNESIUM SERPL-MCNC: 1.7 MG/DL — SIGNIFICANT CHANGE UP (ref 1.6–2.6)
PHOSPHATE SERPL-MCNC: 2.3 MG/DL — LOW (ref 2.5–4.5)
POTASSIUM SERPL-MCNC: 3.3 MMOL/L — LOW (ref 3.5–5.3)
POTASSIUM SERPL-SCNC: 3.3 MMOL/L — LOW (ref 3.5–5.3)
RH IG SCN BLD-IMP: POSITIVE — SIGNIFICANT CHANGE UP
SODIUM SERPL-SCNC: 142 MMOL/L — SIGNIFICANT CHANGE UP (ref 135–145)

## 2017-07-24 PROCEDURE — 99233 SBSQ HOSP IP/OBS HIGH 50: CPT

## 2017-07-24 RX ORDER — POTASSIUM CHLORIDE 20 MEQ
20 PACKET (EA) ORAL ONCE
Qty: 0 | Refills: 0 | Status: COMPLETED | OUTPATIENT
Start: 2017-07-24 | End: 2017-07-24

## 2017-07-24 RX ORDER — POTASSIUM CHLORIDE 20 MEQ
40 PACKET (EA) ORAL DAILY
Qty: 0 | Refills: 0 | Status: DISCONTINUED | OUTPATIENT
Start: 2017-07-24 | End: 2017-07-28

## 2017-07-24 RX ADMIN — Medication 1 MILLIGRAM(S): at 22:30

## 2017-07-24 RX ADMIN — Medication 250 MILLIGRAM(S): at 05:37

## 2017-07-24 RX ADMIN — Medication 20 MILLIEQUIVALENT(S): at 12:01

## 2017-07-24 RX ADMIN — ATORVASTATIN CALCIUM 10 MILLIGRAM(S): 80 TABLET, FILM COATED ORAL at 22:30

## 2017-07-24 RX ADMIN — Medication 40 MILLIEQUIVALENT(S): at 20:08

## 2017-07-24 RX ADMIN — Medication 40 MILLIEQUIVALENT(S): at 05:38

## 2017-07-24 RX ADMIN — Medication 250 MILLIGRAM(S): at 13:29

## 2017-07-24 RX ADMIN — Medication 75 MICROGRAM(S): at 05:37

## 2017-07-24 RX ADMIN — Medication 250 MILLIGRAM(S): at 22:30

## 2017-07-24 RX ADMIN — Medication 1: at 11:59

## 2017-07-24 RX ADMIN — Medication 1: at 09:41

## 2017-07-24 RX ADMIN — Medication 325 MILLIGRAM(S): at 12:01

## 2017-07-24 RX ADMIN — SERTRALINE 50 MILLIGRAM(S): 25 TABLET, FILM COATED ORAL at 12:02

## 2017-07-24 NOTE — PROGRESS NOTE ADULT - SUBJECTIVE AND OBJECTIVE BOX
Patient is a 81y old  Female who presents with a chief complaint of Diarrhea (07 Jul 2017 10:50)        SUBJECTIVE / OVERNIGHT EVENTS:    no acute events o/n. Son at bedside.   pt denies all complaints.     MEDICATIONS  (STANDING):  sertraline 50 milliGRAM(s) Oral daily  atorvastatin 10 milliGRAM(s) Oral at bedtime  ferrous    sulfate 325 milliGRAM(s) Oral daily  levothyroxine 75 MICROGram(s) Oral daily  insulin lispro (HumaLOG) corrective regimen sliding scale   SubCutaneous three times a day before meals  insulin lispro (HumaLOG) corrective regimen sliding scale   SubCutaneous at bedtime  dextrose 5%. 1000 milliLiter(s) (50 mL/Hr) IV Continuous <Continuous>  dextrose 50% Injectable 12.5 Gram(s) IV Push once  dextrose 50% Injectable 25 Gram(s) IV Push once  dextrose 50% Injectable 25 Gram(s) IV Push once  sodium chloride 0.9%. 1000 milliLiter(s) (50 mL/Hr) IV Continuous <Continuous>  valproic  acid Syrup 250 milliGRAM(s) Oral three times a day  doxazosin 1 milliGRAM(s) Oral at bedtime    MEDICATIONS  (PRN):  dextrose Gel 1 Dose(s) Oral once PRN Blood Glucose LESS THAN 70 milliGRAM(s)/deciliter  glucagon  Injectable 1 milliGRAM(s) IntraMuscular once PRN Glucose LESS THAN 70 milligrams/deciliter  QUEtiapine 25 milliGRAM(s) Oral every 4 hours PRN For agitation  artificial  tears Solution 1 Drop(s) Both EYES four times a day PRN Dry Eyes  polyethylene glycol 3350 17 Gram(s) Oral daily PRN Constipation  acetaminophen    Suspension. 650 milliGRAM(s) Oral every 6 hours PRN Mild Pain (1 - 3) and moderate pain  bisacodyl Suppository 10 milliGRAM(s) Rectal daily PRN Constipation  acetaminophen   Tablet. 650 milliGRAM(s) Oral every 6 hours PRN Moderate Pain (4 - 6)  acetaminophen  Suppository 650 milliGRAM(s) Rectal every 6 hours PRN For Temp greater than 38 C (100.4 F)      Vital Signs Last 24 Hrs  T(C): 37.5 (24 Jul 2017 14:20), Max: 37.6 (23 Jul 2017 22:08)  T(F): 99.5 (24 Jul 2017 14:20), Max: 99.7 (23 Jul 2017 22:08)  HR: 67 (24 Jul 2017 14:20) (67 - 75)  BP: 132/74 (24 Jul 2017 14:20) (127/67 - 140/70)  BP(mean): --  RR: 18 (24 Jul 2017 14:20) (18 - 18)  SpO2: 96% (24 Jul 2017 14:20) (96% - 97%)  CAPILLARY BLOOD GLUCOSE  153 (24 Jul 2017 12:00)  170 (24 Jul 2017 09:23)  154 (23 Jul 2017 21:29)  145 (23 Jul 2017 17:01)        I&O's Summary    23 Jul 2017 07:01  -  24 Jul 2017 07:00  --------------------------------------------------------  IN: 600 mL / OUT: 0 mL / NET: 600 mL          PHYSICAL EXAM  GENERAL: NAD, well-developed  NECK: Supple, No JVD  CHEST/LUNG: Clear to auscultation bilaterally; No wheeze  HEART: Regular rate and rhythm; No murmurs, rubs, or gallops  ABDOMEN: Soft, Nontender, Nondistended; Bowel sounds present  EXTREMITIES:  2+ Peripheral Pulses, No clubbing, cyanosis, or edema      LABS:                        7.8    25.74 )-----------( 892      ( 23 Jul 2017 05:51 )             25.2     07-24    142  |  103  |  11  ----------------------------<  165<H>  3.3<L>   |  30  |  0.36<L>    Ca    9.5      24 Jul 2017 07:09  Phos  2.3     07-24  Mg     1.7     07-24                RADIOLOGY & ADDITIONAL TESTS:    Imaging Personally Reviewed:  Consultant(s) Notes Reviewed:    Care Discussed with Consultants/Other Providers:

## 2017-07-24 NOTE — PROGRESS NOTE ADULT - PROBLEM SELECTOR PLAN 1
likely procedure related- resolved now.   s/p nephroureteral stent exchange and NT capped on 7/17 by IR  good urine output

## 2017-07-24 NOTE — PROGRESS NOTE ADULT - PROBLEM SELECTOR PLAN 6
- continue Depakote and Zoloft  -pt on PRN Seroquel  for agitation  -functional quadriplegia : supportive care / assistance with ADLs  - Glucerna for dietary supplement   -skin care as per protocol  - aspiration precautions

## 2017-07-25 LAB
BUN SERPL-MCNC: 11 MG/DL — SIGNIFICANT CHANGE UP (ref 7–23)
CALCIUM SERPL-MCNC: 9.9 MG/DL — SIGNIFICANT CHANGE UP (ref 8.4–10.5)
CHLORIDE SERPL-SCNC: 102 MMOL/L — SIGNIFICANT CHANGE UP (ref 98–107)
CO2 SERPL-SCNC: 30 MMOL/L — SIGNIFICANT CHANGE UP (ref 22–31)
CREAT SERPL-MCNC: 0.34 MG/DL — LOW (ref 0.5–1.3)
GLUCOSE SERPL-MCNC: 154 MG/DL — HIGH (ref 70–99)
MAGNESIUM SERPL-MCNC: 1.8 MG/DL — SIGNIFICANT CHANGE UP (ref 1.6–2.6)
PHOSPHATE SERPL-MCNC: 2.5 MG/DL — SIGNIFICANT CHANGE UP (ref 2.5–4.5)
POTASSIUM SERPL-MCNC: 3 MMOL/L — LOW (ref 3.5–5.3)
POTASSIUM SERPL-SCNC: 3 MMOL/L — LOW (ref 3.5–5.3)
SODIUM SERPL-SCNC: 142 MMOL/L — SIGNIFICANT CHANGE UP (ref 135–145)

## 2017-07-25 PROCEDURE — 99233 SBSQ HOSP IP/OBS HIGH 50: CPT

## 2017-07-25 PROCEDURE — 93971 EXTREMITY STUDY: CPT | Mod: 26

## 2017-07-25 RX ORDER — POTASSIUM CHLORIDE 20 MEQ
10 PACKET (EA) ORAL
Qty: 0 | Refills: 0 | Status: COMPLETED | OUTPATIENT
Start: 2017-07-25 | End: 2017-07-25

## 2017-07-25 RX ORDER — FENTANYL CITRATE 50 UG/ML
1 INJECTION INTRAVENOUS
Qty: 0 | Refills: 0 | Status: DISCONTINUED | OUTPATIENT
Start: 2017-07-25 | End: 2017-07-27

## 2017-07-25 RX ADMIN — Medication 100 MILLIEQUIVALENT(S): at 18:47

## 2017-07-25 RX ADMIN — Medication 250 MILLIGRAM(S): at 13:24

## 2017-07-25 RX ADMIN — Medication 100 MILLIEQUIVALENT(S): at 12:26

## 2017-07-25 RX ADMIN — Medication 650 MILLIGRAM(S): at 04:15

## 2017-07-25 RX ADMIN — Medication 100 MILLIEQUIVALENT(S): at 10:50

## 2017-07-25 RX ADMIN — Medication 325 MILLIGRAM(S): at 13:24

## 2017-07-25 RX ADMIN — SERTRALINE 50 MILLIGRAM(S): 25 TABLET, FILM COATED ORAL at 13:24

## 2017-07-25 RX ADMIN — Medication 650 MILLIGRAM(S): at 16:40

## 2017-07-25 RX ADMIN — Medication 650 MILLIGRAM(S): at 03:34

## 2017-07-25 RX ADMIN — ATORVASTATIN CALCIUM 10 MILLIGRAM(S): 80 TABLET, FILM COATED ORAL at 21:41

## 2017-07-25 RX ADMIN — Medication 40 MILLIEQUIVALENT(S): at 13:24

## 2017-07-25 RX ADMIN — Medication 100 MILLIEQUIVALENT(S): at 20:46

## 2017-07-25 RX ADMIN — Medication 1 MILLIGRAM(S): at 21:41

## 2017-07-25 RX ADMIN — Medication 250 MILLIGRAM(S): at 06:22

## 2017-07-25 RX ADMIN — Medication 75 MICROGRAM(S): at 06:22

## 2017-07-25 RX ADMIN — Medication 250 MILLIGRAM(S): at 21:41

## 2017-07-25 RX ADMIN — Medication 1: at 13:23

## 2017-07-25 RX ADMIN — FENTANYL CITRATE 1 PATCH: 50 INJECTION INTRAVENOUS at 18:48

## 2017-07-25 RX ADMIN — Medication 100 MILLIEQUIVALENT(S): at 17:13

## 2017-07-25 RX ADMIN — Medication 650 MILLIGRAM(S): at 17:10

## 2017-07-25 RX ADMIN — Medication 100 MILLIEQUIVALENT(S): at 09:32

## 2017-07-25 RX ADMIN — QUETIAPINE FUMARATE 25 MILLIGRAM(S): 200 TABLET, FILM COATED ORAL at 00:53

## 2017-07-25 RX ADMIN — SODIUM CHLORIDE 50 MILLILITER(S): 9 INJECTION INTRAMUSCULAR; INTRAVENOUS; SUBCUTANEOUS at 09:33

## 2017-07-25 RX ADMIN — Medication 1: at 09:32

## 2017-07-25 RX ADMIN — SODIUM CHLORIDE 50 MILLILITER(S): 9 INJECTION INTRAMUSCULAR; INTRAVENOUS; SUBCUTANEOUS at 06:22

## 2017-07-25 NOTE — PROGRESS NOTE ADULT - PROBLEM SELECTOR PLAN 7
DVT chronic   holding eliquis at this time  repeated RLE Doppler - negative  no indication to resume A/C at this time

## 2017-07-25 NOTE — CHART NOTE - NSCHARTNOTEFT_GEN_A_CORE
NUTRITION SERVICES     Upon Nutritional Assessment by the Registered Dietitian your patient was determined to meet criteria/ has evidence of the following diagnosis/diagnoses:  [ ] Mild Protein Calorie Malnutrition   [X] Moderate Protein Calorie Malnutrition   [ ] Severe Protein Calorie Malnutrition   [ ] Unspecified Protein Calorie Malnutrition   [ ] Underweight / BMI <19  [ ] Morbid Obesity / BMI >40    Findings as based on:  •  Comprehensive nutritional assessment and consultation    Please refer to Initial Dietitian Evaluation via documents section of [employer] for further details.      Yoana Starkey RDN, CDN   pager: [phone]

## 2017-07-25 NOTE — PROGRESS NOTE ADULT - SUBJECTIVE AND OBJECTIVE BOX
Patient is a 81y old  Female who presents with a chief complaint of Diarrhea (07 Jul 2017 10:50)        SUBJECTIVE / OVERNIGHT EVENTS:  no acute events o/n  denies complaints  son at bedside    MEDICATIONS  (STANDING):  sertraline 50 milliGRAM(s) Oral daily  atorvastatin 10 milliGRAM(s) Oral at bedtime  ferrous    sulfate 325 milliGRAM(s) Oral daily  levothyroxine 75 MICROGram(s) Oral daily  insulin lispro (HumaLOG) corrective regimen sliding scale   SubCutaneous three times a day before meals  insulin lispro (HumaLOG) corrective regimen sliding scale   SubCutaneous at bedtime  dextrose 5%. 1000 milliLiter(s) (50 mL/Hr) IV Continuous <Continuous>  dextrose 50% Injectable 12.5 Gram(s) IV Push once  dextrose 50% Injectable 25 Gram(s) IV Push once  dextrose 50% Injectable 25 Gram(s) IV Push once  sodium chloride 0.9%. 1000 milliLiter(s) (50 mL/Hr) IV Continuous <Continuous>  valproic  acid Syrup 250 milliGRAM(s) Oral three times a day  doxazosin 1 milliGRAM(s) Oral at bedtime  potassium chloride   Powder 40 milliEquivalent(s) Oral daily  potassium chloride  10 mEq/100 mL IVPB 10 milliEquivalent(s) IV Intermittent every 1 hour    MEDICATIONS  (PRN):  dextrose Gel 1 Dose(s) Oral once PRN Blood Glucose LESS THAN 70 milliGRAM(s)/deciliter  glucagon  Injectable 1 milliGRAM(s) IntraMuscular once PRN Glucose LESS THAN 70 milligrams/deciliter  QUEtiapine 25 milliGRAM(s) Oral every 4 hours PRN For agitation  artificial  tears Solution 1 Drop(s) Both EYES four times a day PRN Dry Eyes  polyethylene glycol 3350 17 Gram(s) Oral daily PRN Constipation  acetaminophen    Suspension. 650 milliGRAM(s) Oral every 6 hours PRN Mild Pain (1 - 3) and moderate pain  bisacodyl Suppository 10 milliGRAM(s) Rectal daily PRN Constipation  acetaminophen   Tablet. 650 milliGRAM(s) Oral every 6 hours PRN Moderate Pain (4 - 6)  acetaminophen  Suppository 650 milliGRAM(s) Rectal every 6 hours PRN For Temp greater than 38 C (100.4 F)      Vital Signs Last 24 Hrs  T(C): 36.7 (25 Jul 2017 13:36), Max: 37.2 (24 Jul 2017 22:17)  T(F): 98 (25 Jul 2017 13:36), Max: 98.9 (24 Jul 2017 22:17)  HR: 77 (25 Jul 2017 13:36) (69 - 77)  BP: 143/66 (25 Jul 2017 13:36) (125/62 - 143/66)  BP(mean): --  RR: 18 (25 Jul 2017 13:36) (17 - 18)  SpO2: 99% (25 Jul 2017 13:36) (97% - 99%)  CAPILLARY BLOOD GLUCOSE  153 (25 Jul 2017 12:36)  187 (25 Jul 2017 09:17)  188 (24 Jul 2017 22:17)        I&O's Summary        PHYSICAL EXAM  GENERAL: NAD, well-developed  CHEST/LUNG: Clear to auscultation bilaterally; No wheeze  HEART: Regular rate and rhythm; No murmurs, rubs, or gallops  ABDOMEN: Soft, Nontender, Nondistended; Bowel sounds present  EXTREMITIES:  2+ Peripheral Pulses, No clubbing, cyanosis, or edema      LABS:    07-25    142  |  102  |  11  ----------------------------<  154<H>  3.0<L>   |  30  |  0.34<L>    Ca    9.9      25 Jul 2017 06:31  Phos  2.5     07-25  Mg     1.8     07-25                RADIOLOGY & ADDITIONAL TESTS:    Imaging Personally Reviewed:  Consultant(s) Notes Reviewed:    Care Discussed with Consultants/Other Providers:

## 2017-07-25 NOTE — DIETITIAN INITIAL EVALUATION ADULT. - OTHER INFO
Pt. reported w persistently poor PO intake.  S/p swallow evaluation 7.19.17 with recommendations for dysphagia I (pureed) w nectar thick liquids.  No noted food allergies or nausea/vomiting/diarrhea/constipation at this time.   Pt. w previously reported Hx of weight loss as noted in prior admission chart review, although unable to quantify specific amount of weight decrease at this time.  Also noted in chart w poor prognosis & pending D/C w hospice services.

## 2017-07-26 LAB
BUN SERPL-MCNC: 10 MG/DL — SIGNIFICANT CHANGE UP (ref 7–23)
CALCIUM SERPL-MCNC: 10.2 MG/DL — SIGNIFICANT CHANGE UP (ref 8.4–10.5)
CHLORIDE SERPL-SCNC: 101 MMOL/L — SIGNIFICANT CHANGE UP (ref 98–107)
CO2 SERPL-SCNC: 28 MMOL/L — SIGNIFICANT CHANGE UP (ref 22–31)
CREAT SERPL-MCNC: 0.34 MG/DL — LOW (ref 0.5–1.3)
GLUCOSE SERPL-MCNC: 157 MG/DL — HIGH (ref 70–99)
HCT VFR BLD CALC: 24.1 % — LOW (ref 34.5–45)
HGB BLD-MCNC: 7.8 G/DL — LOW (ref 11.5–15.5)
MAGNESIUM SERPL-MCNC: 1.8 MG/DL — SIGNIFICANT CHANGE UP (ref 1.6–2.6)
MCHC RBC-ENTMCNC: 27.4 PG — SIGNIFICANT CHANGE UP (ref 27–34)
MCHC RBC-ENTMCNC: 32.4 % — SIGNIFICANT CHANGE UP (ref 32–36)
MCV RBC AUTO: 84.6 FL — SIGNIFICANT CHANGE UP (ref 80–100)
NRBC # FLD: 0 — SIGNIFICANT CHANGE UP
PHOSPHATE SERPL-MCNC: 2.5 MG/DL — SIGNIFICANT CHANGE UP (ref 2.5–4.5)
PLATELET # BLD AUTO: 817 K/UL — HIGH (ref 150–400)
PMV BLD: 10.5 FL — SIGNIFICANT CHANGE UP (ref 7–13)
POTASSIUM SERPL-MCNC: 3.7 MMOL/L — SIGNIFICANT CHANGE UP (ref 3.5–5.3)
POTASSIUM SERPL-SCNC: 3.7 MMOL/L — SIGNIFICANT CHANGE UP (ref 3.5–5.3)
RBC # BLD: 2.85 M/UL — LOW (ref 3.8–5.2)
RBC # FLD: 19.3 % — HIGH (ref 10.3–14.5)
SODIUM SERPL-SCNC: 140 MMOL/L — SIGNIFICANT CHANGE UP (ref 135–145)
WBC # BLD: 30.61 K/UL — HIGH (ref 3.8–10.5)
WBC # FLD AUTO: 30.61 K/UL — HIGH (ref 3.8–10.5)

## 2017-07-26 PROCEDURE — 99232 SBSQ HOSP IP/OBS MODERATE 35: CPT

## 2017-07-26 RX ADMIN — Medication 1: at 18:22

## 2017-07-26 RX ADMIN — Medication 250 MILLIGRAM(S): at 21:55

## 2017-07-26 RX ADMIN — SODIUM CHLORIDE 50 MILLILITER(S): 9 INJECTION INTRAMUSCULAR; INTRAVENOUS; SUBCUTANEOUS at 02:06

## 2017-07-26 RX ADMIN — Medication 1 MILLIGRAM(S): at 21:55

## 2017-07-26 RX ADMIN — QUETIAPINE FUMARATE 25 MILLIGRAM(S): 200 TABLET, FILM COATED ORAL at 02:06

## 2017-07-26 RX ADMIN — Medication 40 MILLIEQUIVALENT(S): at 13:11

## 2017-07-26 RX ADMIN — Medication 250 MILLIGRAM(S): at 13:12

## 2017-07-26 RX ADMIN — SERTRALINE 50 MILLIGRAM(S): 25 TABLET, FILM COATED ORAL at 13:13

## 2017-07-26 RX ADMIN — Medication 1: at 09:02

## 2017-07-26 RX ADMIN — SODIUM CHLORIDE 50 MILLILITER(S): 9 INJECTION INTRAMUSCULAR; INTRAVENOUS; SUBCUTANEOUS at 21:55

## 2017-07-26 RX ADMIN — SODIUM CHLORIDE 50 MILLILITER(S): 9 INJECTION INTRAMUSCULAR; INTRAVENOUS; SUBCUTANEOUS at 09:02

## 2017-07-26 RX ADMIN — Medication 75 MICROGRAM(S): at 05:56

## 2017-07-26 RX ADMIN — Medication 325 MILLIGRAM(S): at 13:12

## 2017-07-26 RX ADMIN — Medication 250 MILLIGRAM(S): at 05:56

## 2017-07-26 NOTE — SWALLOW BEDSIDE ASSESSMENT ADULT - SWALLOW EVAL: CURRENT DIET
Puree with Nectar Thick Liquids.
Mechanical Soft with Nectar Thick Liquids
Puree with Nectar Thick Liquids

## 2017-07-26 NOTE — SWALLOW BEDSIDE ASSESSMENT ADULT - COMMENTS
Patient is known to this service. Patient was seen previously on 7/6 and 7/19 (See Consultations for details) with recommendations for puree and nectar thick liquids.  Patient seen at bedside, asleep state. Patient's daughter is present ([place]) and expressed to not wake her mother up. Patient's daughter reports that her mother is not eating and spitting out the food.  SLP provided patient's daughter education to provide attentive hand feeding to her mother, and to encourage her mother for oral intake as tolerated throughout the day and her mother must be a willing participant.  Patient's daughter report that family has opted for no feeding tubes and comfort feeds with home hospice care.     This service to Sign Off. 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  Pt's daughter reports that about 2 weeks ago, pt was treated for UTI with Levaquin and Diflucan.  4 Days ago, she developed green, watery diarrhea.  3 days ago, pt's daughter gave Imodium with resolution of diarrhea yesterday.  Diarrhea again returned today, and daughter gave Imodium this AM.  Pt had low grade temp.  No indication of abdominal pain per daughter.   No sick contacts or recent travel.      Patient is known to this service. Patient was seen previously on 7/6 and 7/19 (See Consultations for details) with recommendations for puree and nectar thick liquids.  Patient seen at bedside, asleep state. Patient's daughter is present ([place]) and expressed to not wake her mother up. Patient's daughter reports that her mother is not eating well and spitting out the food.  SLP provided patient's daughter education to provide attentive hand feeding to her mother, and to encourage her mother for oral intake as tolerated throughout the day and her mother must be a willing participant which may also be exacerbated by her Alzheimers Dementia state impacting on oral intake.  Patient's daughter report that family has opted for no feeding tubes and to continue with comfort feeds and home hospice care.       This service to Sign Off.

## 2017-07-27 DIAGNOSIS — E43 UNSPECIFIED SEVERE PROTEIN-CALORIE MALNUTRITION: ICD-10-CM

## 2017-07-27 DIAGNOSIS — Z71.89 OTHER SPECIFIED COUNSELING: ICD-10-CM

## 2017-07-27 DIAGNOSIS — R62.7 ADULT FAILURE TO THRIVE: ICD-10-CM

## 2017-07-27 PROCEDURE — 99233 SBSQ HOSP IP/OBS HIGH 50: CPT

## 2017-07-27 PROCEDURE — 99498 ADVNCD CARE PLAN ADDL 30 MIN: CPT | Mod: 25

## 2017-07-27 PROCEDURE — 99497 ADVNCD CARE PLAN 30 MIN: CPT | Mod: 25

## 2017-07-27 RX ORDER — FENTANYL CITRATE 50 UG/ML
1 INJECTION INTRAVENOUS
Qty: 0 | Refills: 0 | Status: DISCONTINUED | OUTPATIENT
Start: 2017-07-27 | End: 2017-08-02

## 2017-07-27 RX ORDER — FENTANYL CITRATE 50 UG/ML
1 INJECTION INTRAVENOUS
Qty: 0 | Refills: 0 | Status: DISCONTINUED | OUTPATIENT
Start: 2017-07-27 | End: 2017-07-27

## 2017-07-27 RX ADMIN — Medication 325 MILLIGRAM(S): at 13:11

## 2017-07-27 RX ADMIN — Medication 1: at 09:17

## 2017-07-27 RX ADMIN — Medication 75 MICROGRAM(S): at 05:10

## 2017-07-27 RX ADMIN — Medication 1: at 13:10

## 2017-07-27 RX ADMIN — QUETIAPINE FUMARATE 25 MILLIGRAM(S): 200 TABLET, FILM COATED ORAL at 23:21

## 2017-07-27 RX ADMIN — FENTANYL CITRATE 1 PATCH: 50 INJECTION INTRAVENOUS at 23:21

## 2017-07-27 RX ADMIN — Medication 250 MILLIGRAM(S): at 13:11

## 2017-07-27 RX ADMIN — Medication 250 MILLIGRAM(S): at 05:10

## 2017-07-27 RX ADMIN — ATORVASTATIN CALCIUM 10 MILLIGRAM(S): 80 TABLET, FILM COATED ORAL at 23:21

## 2017-07-27 RX ADMIN — Medication 40 MILLIEQUIVALENT(S): at 13:11

## 2017-07-27 RX ADMIN — SERTRALINE 50 MILLIGRAM(S): 25 TABLET, FILM COATED ORAL at 13:11

## 2017-07-27 RX ADMIN — Medication 1: at 18:11

## 2017-07-27 RX ADMIN — Medication 250 MILLIGRAM(S): at 23:21

## 2017-07-27 RX ADMIN — Medication 1 MILLIGRAM(S): at 23:21

## 2017-07-27 NOTE — PROGRESS NOTE ADULT - ATTENDING COMMENTS
D/c planning to home hospice D/c planning to home hospice, have asked Palliative Care for f/u as family still need a lot of reassurance

## 2017-07-27 NOTE — PROGRESS NOTE ADULT - ASSESSMENT
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  Pt's daughter reports that about 2 weeks ago, pt was treated for UTI with Levaquin and Diflucan.  4 Days ago, she developed green, watery diarrhea.  3 days ago, pt's daughter gave Imodium with resolution of diarrhea yesterday.  Diarrhea again returned today, and daughter gave Imodium this AM.  Pt had low grade temp.  No indication of abdominal pain per daughter.   No sick contacts or recent travel.

## 2017-07-27 NOTE — PROGRESS NOTE ADULT - SUBJECTIVE AND OBJECTIVE BOX
Patient is a 81y old  Female who presents with a chief complaint of Diarrhea (07 Jul 2017 10:50)        SUBJECTIVE / OVERNIGHT EVENTS:    overnight pt noted to be coughing after taking crushed Po meds, placed on supplemental o2, o2 sats now 96% on RA.  pt is lethargic, but arousable to verbal stimuli  no apparent distress     MEDICATIONS  (STANDING):  sertraline 50 milliGRAM(s) Oral daily  atorvastatin 10 milliGRAM(s) Oral at bedtime  ferrous    sulfate 325 milliGRAM(s) Oral daily  levothyroxine 75 MICROGram(s) Oral daily  insulin lispro (HumaLOG) corrective regimen sliding scale   SubCutaneous three times a day before meals  insulin lispro (HumaLOG) corrective regimen sliding scale   SubCutaneous at bedtime  dextrose 5%. 1000 milliLiter(s) (50 mL/Hr) IV Continuous <Continuous>  dextrose 50% Injectable 12.5 Gram(s) IV Push once  dextrose 50% Injectable 25 Gram(s) IV Push once  dextrose 50% Injectable 25 Gram(s) IV Push once  sodium chloride 0.9%. 1000 milliLiter(s) (50 mL/Hr) IV Continuous <Continuous>  valproic  acid Syrup 250 milliGRAM(s) Oral three times a day  doxazosin 1 milliGRAM(s) Oral at bedtime  potassium chloride   Powder 40 milliEquivalent(s) Oral daily  fentaNYL   Patch  12 MICROgram(s)/Hr 1 Patch Transdermal every 72 hours    MEDICATIONS  (PRN):  dextrose Gel 1 Dose(s) Oral once PRN Blood Glucose LESS THAN 70 milliGRAM(s)/deciliter  glucagon  Injectable 1 milliGRAM(s) IntraMuscular once PRN Glucose LESS THAN 70 milligrams/deciliter  QUEtiapine 25 milliGRAM(s) Oral every 4 hours PRN For agitation  artificial  tears Solution 1 Drop(s) Both EYES four times a day PRN Dry Eyes  polyethylene glycol 3350 17 Gram(s) Oral daily PRN Constipation  acetaminophen    Suspension. 650 milliGRAM(s) Oral every 6 hours PRN Mild Pain (1 - 3) and moderate pain  bisacodyl Suppository 10 milliGRAM(s) Rectal daily PRN Constipation  acetaminophen   Tablet. 650 milliGRAM(s) Oral every 6 hours PRN Moderate Pain (4 - 6)  acetaminophen  Suppository 650 milliGRAM(s) Rectal every 6 hours PRN For Temp greater than 38 C (100.4 F)      Vital Signs Last 24 Hrs  T(C): 37.6 (26 Jul 2017 22:40), Max: 37.6 (26 Jul 2017 22:40)  T(F): 99.7 (26 Jul 2017 22:40), Max: 99.7 (26 Jul 2017 22:40)  HR: 67 (27 Jul 2017 12:18) (67 - 113)  BP: 129/72 (27 Jul 2017 12:18) (117/77 - 129/72)  BP(mean): --  RR: 20 (27 Jul 2017 12:18) (18 - 20)  SpO2: 95% (27 Jul 2017 12:18) (93% - 98%)  CAPILLARY BLOOD GLUCOSE  161 (27 Jul 2017 12:44)  178 (27 Jul 2017 09:16)  179 (26 Jul 2017 22:20)  166 (26 Jul 2017 18:10)        I&O's Summary    26 Jul 2017 07:01  -  27 Jul 2017 07:00  --------------------------------------------------------  IN: 400 mL / OUT: 0 mL / NET: 400 mL          PHYSICAL EXAM  GENERAL: NAD, well-developed  CHEST/LUNG: Clear to auscultation bilaterally; No wheeze  HEART: Regular rate and rhythm; No murmurs, rubs, or gallops  ABDOMEN: Soft, Nontender, Nondistended; Bowel sounds present  EXTREMITIES:  2+ Peripheral Pulses, No clubbing, cyanosis, or edema  SKIN: No rashes or lesions    LABS:                        7.8    30.61 )-----------( 817      ( 26 Jul 2017 06:01 )             24.1     07-26    140  |  101  |  10  ----------------------------<  157<H>  3.7   |  28  |  0.34<L>    Ca    10.2      26 Jul 2017 06:01  Phos  2.5     07-26  Mg     1.8     07-26                RADIOLOGY & ADDITIONAL TESTS:    Imaging Personally Reviewed:  Consultant(s) Notes Reviewed:    Care Discussed with Consultants/Other Providers: Palliative Care

## 2017-07-27 NOTE — PROGRESS NOTE ADULT - PROBLEM SELECTOR PLAN 6
Extensive discussion with 2 daughters and son.  Multiple concerns addressed on Dementia progression, end of life, Hospice care.  Family very stressed about taking pt home and how they will manage pt as disease progresses.  Reassurance provided and educated them on Hospice support and services.  Pt full code at this time.  Family too emotional to further discuss code status.  Hospice to follow up.  Ongoing support.  Totat of 60 minutes spent.

## 2017-07-27 NOTE — PROGRESS NOTE ADULT - SUBJECTIVE AND OBJECTIVE BOX
INTERVAL HPI/OVERNIGHT EVENTS:    Allergies    adhesives (Rash)  No Known Drug Allergies    Intolerances        ADVANCE DIRECTIVES:  [ ] YES [X ] NO    DNR: [ ] YES [x ] NO      Date Completed:                    MOLST [ ] YES [x ] NO   Date Completed:       PRESENT SYMPTOMS:   SOURCE:  [ ] Patient   [x ] Family   [x ] Team     Pain:     Dyspnea:  [ ] YES [ ] NO  Anxiety:  [ ] YES [ ] NO  Fatigue: [ ] YES [ ] NO  Nausea: [ ] YES [ ] NO  Loss of Appetite: [ ] YES [ ] NO  Constipation [ ] YES   [ ] No     OTHER SYMPTOMS:  [ ] All other ROS negative     [x ] Unable to obtain due to poor mentation    MEDICATIONS  (STANDING):  sertraline 50 milliGRAM(s) Oral daily  atorvastatin 10 milliGRAM(s) Oral at bedtime  ferrous    sulfate 325 milliGRAM(s) Oral daily  levothyroxine 75 MICROGram(s) Oral daily  insulin lispro (HumaLOG) corrective regimen sliding scale   SubCutaneous three times a day before meals  insulin lispro (HumaLOG) corrective regimen sliding scale   SubCutaneous at bedtime  dextrose 5%. 1000 milliLiter(s) (50 mL/Hr) IV Continuous <Continuous>  dextrose 50% Injectable 12.5 Gram(s) IV Push once  dextrose 50% Injectable 25 Gram(s) IV Push once  dextrose 50% Injectable 25 Gram(s) IV Push once  sodium chloride 0.9%. 1000 milliLiter(s) (50 mL/Hr) IV Continuous <Continuous>  valproic  acid Syrup 250 milliGRAM(s) Oral three times a day  doxazosin 1 milliGRAM(s) Oral at bedtime  potassium chloride   Powder 40 milliEquivalent(s) Oral daily  fentaNYL   Patch  12 MICROgram(s)/Hr 1 Patch Transdermal every 72 hours    MEDICATIONS  (PRN):  dextrose Gel 1 Dose(s) Oral once PRN Blood Glucose LESS THAN 70 milliGRAM(s)/deciliter  glucagon  Injectable 1 milliGRAM(s) IntraMuscular once PRN Glucose LESS THAN 70 milligrams/deciliter  QUEtiapine 25 milliGRAM(s) Oral every 4 hours PRN For agitation  artificial  tears Solution 1 Drop(s) Both EYES four times a day PRN Dry Eyes  polyethylene glycol 3350 17 Gram(s) Oral daily PRN Constipation  acetaminophen    Suspension. 650 milliGRAM(s) Oral every 6 hours PRN Mild Pain (1 - 3) and moderate pain  bisacodyl Suppository 10 milliGRAM(s) Rectal daily PRN Constipation  acetaminophen   Tablet. 650 milliGRAM(s) Oral every 6 hours PRN Moderate Pain (4 - 6)  acetaminophen  Suppository 650 milliGRAM(s) Rectal every 6 hours PRN For Temp greater than 38 C (100.4 F)      Karnofsky Performance Score/Palliative Performance Status Version 2:  10-20       %  Protein Calorie Malnutrition:  [ ] Mild   [ ] Moderate   [ ] Severe     Physical Exam:    General: [ ] Alert,  A&O x     [x ] lethargic   [ ] Agitated   [ ] Cachexia   HEENT: [ ] Normal   [x ] Dry mouth   [ ] ET Tube    [ ] Trach   Lungs: [x ] Normal   [ ] Tachypnea   [ ] Audible excessive secretions   Cardiovascular:  [x ] Normal    [ ] Tachycardia   [ ] Bradycardia   Abdomen: [x ] Normal   [ ] Distended   [ ] Tender   [ ] Bowel Sounds   [ ]PEG   [ ] NGT   Last BM:     Genitourinary:  [ ] Normal   [x ] Incontinent   [ ] Oliguria/Anuria   [ ] Burrows  Musculoskeletal:  [ ] Normal   [x ] Generalized weakness  [x ] Bedbound   Neurological: [ ] Normal   [x ] Cognitive impairment     Skin: [ ] Normal   [ ] Pressure ulcers     Vital Signs Last 24 Hrs  T(C): 37.6 (26 Jul 2017 22:40), Max: 37.6 (26 Jul 2017 22:40)  T(F): 99.7 (26 Jul 2017 22:40), Max: 99.7 (26 Jul 2017 22:40)  HR: 67 (27 Jul 2017 12:18) (67 - 113)  BP: 129/72 (27 Jul 2017 12:18) (117/77 - 129/72)  BP(mean): --  RR: 20 (27 Jul 2017 12:18) (18 - 20)  SpO2: 95% (27 Jul 2017 12:18) (93% - 98%)    LABS:                        7.8    30.61 )-----------( 817      ( 26 Jul 2017 06:01 )             24.1     07-26    140  |  101  |  10  ----------------------------<  157<H>  3.7   |  28  |  0.34<L>    Ca    10.2      26 Jul 2017 06:01  Phos  2.5     07-26  Mg     1.8     07-26          I&O's Summary    26 Jul 2017 07:01  -  27 Jul 2017 07:00  --------------------------------------------------------  IN: 400 mL / OUT: 0 mL / NET: 400 mL        RADIOLOGY & ADDITIONAL STUDIES:

## 2017-07-27 NOTE — PROGRESS NOTE ADULT - PROBLEM SELECTOR PLAN 2
Family does not want aggressive measures. Family doesn't want bx or any surgical intervention  overall prognosis is poor

## 2017-07-28 PROCEDURE — 99232 SBSQ HOSP IP/OBS MODERATE 35: CPT

## 2017-07-28 RX ORDER — POTASSIUM CHLORIDE 20 MEQ
10 PACKET (EA) ORAL
Qty: 0 | Refills: 0 | Status: COMPLETED | OUTPATIENT
Start: 2017-07-28 | End: 2017-07-28

## 2017-07-28 RX ADMIN — Medication 100 MILLIEQUIVALENT(S): at 17:41

## 2017-07-28 RX ADMIN — Medication 650 MILLIGRAM(S): at 21:33

## 2017-07-28 RX ADMIN — Medication 250 MILLIGRAM(S): at 21:28

## 2017-07-28 RX ADMIN — Medication 250 MILLIGRAM(S): at 13:40

## 2017-07-28 RX ADMIN — Medication 100 MILLIEQUIVALENT(S): at 16:35

## 2017-07-28 RX ADMIN — Medication 1: at 08:31

## 2017-07-28 RX ADMIN — Medication 75 MICROGRAM(S): at 05:59

## 2017-07-28 RX ADMIN — Medication 250 MILLIGRAM(S): at 05:59

## 2017-07-28 RX ADMIN — Medication 1: at 13:39

## 2017-07-28 RX ADMIN — Medication 100 MILLIEQUIVALENT(S): at 19:45

## 2017-07-28 RX ADMIN — FENTANYL CITRATE 1 PATCH: 50 INJECTION INTRAVENOUS at 18:02

## 2017-07-28 RX ADMIN — Medication 325 MILLIGRAM(S): at 13:40

## 2017-07-28 RX ADMIN — Medication 1 MILLIGRAM(S): at 21:31

## 2017-07-28 RX ADMIN — Medication 1: at 18:16

## 2017-07-28 RX ADMIN — Medication 650 MILLIGRAM(S): at 22:03

## 2017-07-28 NOTE — PROGRESS NOTE ADULT - SUBJECTIVE AND OBJECTIVE BOX
Patient is a 81y old  Female who presents with a chief complaint of Diarrhea (07 Jul 2017 10:50)        SUBJECTIVE / OVERNIGHT EVENTS:    no acute events o/n  pt lethargic but arousable to verbal stimuli  no apparent distress       MEDICATIONS  (STANDING):  sertraline 50 milliGRAM(s) Oral daily  atorvastatin 10 milliGRAM(s) Oral at bedtime  ferrous    sulfate 325 milliGRAM(s) Oral daily  levothyroxine 75 MICROGram(s) Oral daily  insulin lispro (HumaLOG) corrective regimen sliding scale   SubCutaneous three times a day before meals  insulin lispro (HumaLOG) corrective regimen sliding scale   SubCutaneous at bedtime  dextrose 5%. 1000 milliLiter(s) (50 mL/Hr) IV Continuous <Continuous>  dextrose 50% Injectable 12.5 Gram(s) IV Push once  dextrose 50% Injectable 25 Gram(s) IV Push once  dextrose 50% Injectable 25 Gram(s) IV Push once  sodium chloride 0.9%. 1000 milliLiter(s) (50 mL/Hr) IV Continuous <Continuous>  valproic  acid Syrup 250 milliGRAM(s) Oral three times a day  doxazosin 1 milliGRAM(s) Oral at bedtime  potassium chloride   Powder 40 milliEquivalent(s) Oral daily  fentaNYL   Patch  25 MICROgram(s)/Hr 1 Patch Transdermal every 72 hours    MEDICATIONS  (PRN):  dextrose Gel 1 Dose(s) Oral once PRN Blood Glucose LESS THAN 70 milliGRAM(s)/deciliter  glucagon  Injectable 1 milliGRAM(s) IntraMuscular once PRN Glucose LESS THAN 70 milligrams/deciliter  QUEtiapine 25 milliGRAM(s) Oral every 4 hours PRN For agitation  artificial  tears Solution 1 Drop(s) Both EYES four times a day PRN Dry Eyes  polyethylene glycol 3350 17 Gram(s) Oral daily PRN Constipation  acetaminophen    Suspension. 650 milliGRAM(s) Oral every 6 hours PRN Mild Pain (1 - 3) and moderate pain  bisacodyl Suppository 10 milliGRAM(s) Rectal daily PRN Constipation  acetaminophen   Tablet. 650 milliGRAM(s) Oral every 6 hours PRN Moderate Pain (4 - 6)  acetaminophen  Suppository 650 milliGRAM(s) Rectal every 6 hours PRN For Temp greater than 38 C (100.4 F)      Vital Signs Last 24 Hrs  T(C): 36.9 (28 Jul 2017 12:41), Max: 36.9 (28 Jul 2017 12:41)  T(F): 98.4 (28 Jul 2017 12:41), Max: 98.4 (28 Jul 2017 12:41)  HR: 83 (28 Jul 2017 12:41) (72 - 84)  BP: 143/78 (28 Jul 2017 12:41) (125/76 - 143/78)  BP(mean): --  RR: 19 (28 Jul 2017 12:41) (17 - 19)  SpO2: 100% (28 Jul 2017 12:41) (93% - 100%)  CAPILLARY BLOOD GLUCOSE  171 (28 Jul 2017 12:32)  188 (28 Jul 2017 08:27)  152 (27 Jul 2017 22:29)  170 (27 Jul 2017 18:03)        I&O's Summary        PHYSICAL EXAM  GENERAL: NAD, well-developed  CHEST/LUNG: Clear to auscultation bilaterally; No wheeze  HEART: Regular rate and rhythm; No murmurs, rubs, or gallops  ABDOMEN: Soft, Nontender, Nondistended; Bowel sounds present  EXTREMITIES:  2+ Peripheral Pulses, No clubbing, cyanosis, or edema  SKIN: No rashes or lesions    LABS:          RADIOLOGY & ADDITIONAL TESTS:    Imaging Personally Reviewed:  Consultant(s) Notes Reviewed:   Palliative  Care Discussed with Consultants/Other Providers: Palliative

## 2017-07-29 PROCEDURE — 99232 SBSQ HOSP IP/OBS MODERATE 35: CPT

## 2017-07-29 RX ADMIN — Medication 1: at 08:41

## 2017-07-29 RX ADMIN — Medication 1 MILLIGRAM(S): at 22:45

## 2017-07-29 RX ADMIN — Medication 250 MILLIGRAM(S): at 12:48

## 2017-07-29 RX ADMIN — Medication 1: at 18:19

## 2017-07-29 RX ADMIN — Medication 250 MILLIGRAM(S): at 05:44

## 2017-07-29 RX ADMIN — Medication 325 MILLIGRAM(S): at 12:48

## 2017-07-29 RX ADMIN — Medication 250 MILLIGRAM(S): at 23:07

## 2017-07-29 RX ADMIN — Medication 75 MICROGRAM(S): at 05:44

## 2017-07-29 NOTE — PROGRESS NOTE ADULT - SUBJECTIVE AND OBJECTIVE BOX
Patient is a 81y old  Female who presents with a chief complaint of Diarrhea (07 Jul 2017 10:50)        SUBJECTIVE / OVERNIGHT EVENTS:  no acute events o/n  pt lethargic  no apparent distress      MEDICATIONS  (STANDING):  sertraline 50 milliGRAM(s) Oral daily  atorvastatin 10 milliGRAM(s) Oral at bedtime  ferrous    sulfate 325 milliGRAM(s) Oral daily  levothyroxine 75 MICROGram(s) Oral daily  insulin lispro (HumaLOG) corrective regimen sliding scale   SubCutaneous three times a day before meals  insulin lispro (HumaLOG) corrective regimen sliding scale   SubCutaneous at bedtime  dextrose 5%. 1000 milliLiter(s) (50 mL/Hr) IV Continuous <Continuous>  dextrose 50% Injectable 12.5 Gram(s) IV Push once  dextrose 50% Injectable 25 Gram(s) IV Push once  dextrose 50% Injectable 25 Gram(s) IV Push once  sodium chloride 0.9%. 1000 milliLiter(s) (50 mL/Hr) IV Continuous <Continuous>  valproic  acid Syrup 250 milliGRAM(s) Oral three times a day  doxazosin 1 milliGRAM(s) Oral at bedtime  fentaNYL   Patch  25 MICROgram(s)/Hr 1 Patch Transdermal every 72 hours    MEDICATIONS  (PRN):  dextrose Gel 1 Dose(s) Oral once PRN Blood Glucose LESS THAN 70 milliGRAM(s)/deciliter  glucagon  Injectable 1 milliGRAM(s) IntraMuscular once PRN Glucose LESS THAN 70 milligrams/deciliter  QUEtiapine 25 milliGRAM(s) Oral every 4 hours PRN For agitation  artificial  tears Solution 1 Drop(s) Both EYES four times a day PRN Dry Eyes  polyethylene glycol 3350 17 Gram(s) Oral daily PRN Constipation  acetaminophen    Suspension. 650 milliGRAM(s) Oral every 6 hours PRN Mild Pain (1 - 3) and moderate pain  bisacodyl Suppository 10 milliGRAM(s) Rectal daily PRN Constipation  acetaminophen   Tablet. 650 milliGRAM(s) Oral every 6 hours PRN Moderate Pain (4 - 6)  acetaminophen  Suppository 650 milliGRAM(s) Rectal every 6 hours PRN For Temp greater than 38 C (100.4 F)      Vital Signs Last 24 Hrs  T(C): 36.9 (29 Jul 2017 05:46), Max: 36.9 (28 Jul 2017 12:41)  T(F): 98.5 (29 Jul 2017 05:46), Max: 98.5 (29 Jul 2017 05:46)  HR: 94 (29 Jul 2017 05:46) (83 - 94)  BP: 138/74 (29 Jul 2017 05:46) (138/74 - 143/78)  BP(mean): --  RR: 16 (29 Jul 2017 05:46) (16 - 19)  SpO2: 95% (29 Jul 2017 05:46) (95% - 100%)  CAPILLARY BLOOD GLUCOSE  194 (29 Jul 2017 08:40)  173 (28 Jul 2017 22:18)  173 (28 Jul 2017 18:00)  171 (28 Jul 2017 12:32)        I&O's Summary        PHYSICAL EXAM  GENERAL: NAD  CHEST/LUNG: Clear to auscultation bilaterally; No wheeze  HEART: Regular rate and rhythm; No murmurs, rubs, or gallops  ABDOMEN: Soft, Nontender, Nondistended; Bowel sounds present  EXTREMITIES:  2+ Peripheral Pulses, No clubbing, cyanosis, or edema  SKIN: No rashes or lesions              RADIOLOGY & ADDITIONAL TESTS:    Imaging Personally Reviewed:  Consultant(s) Notes Reviewed:    Care Discussed with Consultants/Other Providers:

## 2017-07-30 PROCEDURE — 99232 SBSQ HOSP IP/OBS MODERATE 35: CPT

## 2017-07-30 RX ADMIN — Medication 75 MICROGRAM(S): at 05:06

## 2017-07-30 RX ADMIN — Medication 250 MILLIGRAM(S): at 21:57

## 2017-07-30 RX ADMIN — Medication 1 MILLIGRAM(S): at 21:57

## 2017-07-30 RX ADMIN — SERTRALINE 50 MILLIGRAM(S): 25 TABLET, FILM COATED ORAL at 12:32

## 2017-07-30 RX ADMIN — Medication 1: at 08:42

## 2017-07-30 RX ADMIN — FENTANYL CITRATE 1 PATCH: 50 INJECTION INTRAVENOUS at 23:38

## 2017-07-30 RX ADMIN — Medication 250 MILLIGRAM(S): at 13:07

## 2017-07-30 RX ADMIN — Medication 1: at 18:02

## 2017-07-30 RX ADMIN — Medication 325 MILLIGRAM(S): at 12:32

## 2017-07-30 RX ADMIN — Medication 250 MILLIGRAM(S): at 05:06

## 2017-07-30 NOTE — PROGRESS NOTE ADULT - ATTENDING COMMENTS
D/c planning to home hospice, awaiting reinstatement of HHA  Spoke w/ dtr Vera today, reassurance provided

## 2017-07-30 NOTE — PROGRESS NOTE ADULT - PROBLEM SELECTOR PLAN 2
likely procedure related- resolved now  s/p nephroureteral stent exchange and NT capped on 7/17 by IR  good urine output

## 2017-07-30 NOTE — PROGRESS NOTE ADULT - SUBJECTIVE AND OBJECTIVE BOX
Patient is a 81y old  Female who presents with a chief complaint of Diarrhea (07 Jul 2017 10:50)        SUBJECTIVE / OVERNIGHT EVENTS:  no acute events o/n  pt lethargic but arouses to verbal stimuli  no apparent distress    MEDICATIONS  (STANDING):  sertraline 50 milliGRAM(s) Oral daily  atorvastatin 10 milliGRAM(s) Oral at bedtime  ferrous    sulfate 325 milliGRAM(s) Oral daily  levothyroxine 75 MICROGram(s) Oral daily  insulin lispro (HumaLOG) corrective regimen sliding scale   SubCutaneous three times a day before meals  insulin lispro (HumaLOG) corrective regimen sliding scale   SubCutaneous at bedtime  dextrose 5%. 1000 milliLiter(s) (50 mL/Hr) IV Continuous <Continuous>  dextrose 50% Injectable 12.5 Gram(s) IV Push once  dextrose 50% Injectable 25 Gram(s) IV Push once  dextrose 50% Injectable 25 Gram(s) IV Push once  sodium chloride 0.9%. 1000 milliLiter(s) (50 mL/Hr) IV Continuous <Continuous>  valproic  acid Syrup 250 milliGRAM(s) Oral three times a day  doxazosin 1 milliGRAM(s) Oral at bedtime  fentaNYL   Patch  25 MICROgram(s)/Hr 1 Patch Transdermal every 72 hours    MEDICATIONS  (PRN):  dextrose Gel 1 Dose(s) Oral once PRN Blood Glucose LESS THAN 70 milliGRAM(s)/deciliter  glucagon  Injectable 1 milliGRAM(s) IntraMuscular once PRN Glucose LESS THAN 70 milligrams/deciliter  QUEtiapine 25 milliGRAM(s) Oral every 4 hours PRN For agitation  artificial  tears Solution 1 Drop(s) Both EYES four times a day PRN Dry Eyes  polyethylene glycol 3350 17 Gram(s) Oral daily PRN Constipation  acetaminophen    Suspension. 650 milliGRAM(s) Oral every 6 hours PRN Mild Pain (1 - 3) and moderate pain  bisacodyl Suppository 10 milliGRAM(s) Rectal daily PRN Constipation  acetaminophen   Tablet. 650 milliGRAM(s) Oral every 6 hours PRN Moderate Pain (4 - 6)  acetaminophen  Suppository 650 milliGRAM(s) Rectal every 6 hours PRN For Temp greater than 38 C (100.4 F)      Vital Signs Last 24 Hrs  T(C): 37.3 (30 Jul 2017 05:46), Max: 37.3 (29 Jul 2017 13:59)  T(F): 99.2 (30 Jul 2017 05:46), Max: 99.2 (30 Jul 2017 05:46)  HR: 79 (30 Jul 2017 05:46) (73 - 84)  BP: 129/64 (30 Jul 2017 05:46) (126/62 - 134/72)  BP(mean): --  RR: 17 (30 Jul 2017 05:46) (17 - 17)  SpO2: 98% (30 Jul 2017 05:46) (92% - 98%)  CAPILLARY BLOOD GLUCOSE  194 (30 Jul 2017 08:23)  158 (29 Jul 2017 22:40)  177 (29 Jul 2017 18:13)  118 (29 Jul 2017 12:27)        I&O's Summary      PHYSICAL EXAM  GENERAL: NAD  CHEST/LUNG: Clear to auscultation bilaterally; No wheeze  HEART: Regular rate and rhythm; No murmurs, rubs, or gallops  ABDOMEN: Soft, Nontender, Nondistended; Bowel sounds present  EXTREMITIES:  2+ Peripheral Pulses, No clubbing, cyanosis, or edema  SKIN: No rashes or lesions      LABS:                    RADIOLOGY & ADDITIONAL TESTS:    Imaging Personally Reviewed:  Consultant(s) Notes Reviewed:    Care Discussed with Consultants/Other Providers:

## 2017-07-31 PROCEDURE — 99232 SBSQ HOSP IP/OBS MODERATE 35: CPT

## 2017-07-31 RX ADMIN — Medication 250 MILLIGRAM(S): at 05:59

## 2017-07-31 RX ADMIN — SODIUM CHLORIDE 50 MILLILITER(S): 9 INJECTION INTRAMUSCULAR; INTRAVENOUS; SUBCUTANEOUS at 22:28

## 2017-07-31 RX ADMIN — Medication 1: at 17:30

## 2017-07-31 RX ADMIN — Medication 250 MILLIGRAM(S): at 22:28

## 2017-07-31 RX ADMIN — Medication 325 MILLIGRAM(S): at 12:41

## 2017-07-31 RX ADMIN — Medication 75 MICROGRAM(S): at 05:59

## 2017-07-31 RX ADMIN — Medication 2: at 09:14

## 2017-07-31 RX ADMIN — Medication 1 MILLIGRAM(S): at 22:28

## 2017-07-31 RX ADMIN — Medication 1: at 12:42

## 2017-07-31 RX ADMIN — Medication 250 MILLIGRAM(S): at 13:20

## 2017-07-31 RX ADMIN — SERTRALINE 50 MILLIGRAM(S): 25 TABLET, FILM COATED ORAL at 12:41

## 2017-07-31 NOTE — PROGRESS NOTE ADULT - SUBJECTIVE AND OBJECTIVE BOX
Patient is a 81y old  Female who presents with a chief complaint of Diarrhea (07 Jul 2017 10:50)      SUBJECTIVE / OVERNIGHT EVENTS:  No event.     MEDICATIONS  (STANDING):  sertraline 50 milliGRAM(s) Oral daily  atorvastatin 10 milliGRAM(s) Oral at bedtime  ferrous    sulfate 325 milliGRAM(s) Oral daily  levothyroxine 75 MICROGram(s) Oral daily  insulin lispro (HumaLOG) corrective regimen sliding scale   SubCutaneous three times a day before meals  insulin lispro (HumaLOG) corrective regimen sliding scale   SubCutaneous at bedtime  dextrose 5%. 1000 milliLiter(s) (50 mL/Hr) IV Continuous <Continuous>  dextrose 50% Injectable 12.5 Gram(s) IV Push once  dextrose 50% Injectable 25 Gram(s) IV Push once  dextrose 50% Injectable 25 Gram(s) IV Push once  sodium chloride 0.9%. 1000 milliLiter(s) (50 mL/Hr) IV Continuous <Continuous>  valproic  acid Syrup 250 milliGRAM(s) Oral three times a day  doxazosin 1 milliGRAM(s) Oral at bedtime  fentaNYL   Patch  25 MICROgram(s)/Hr 1 Patch Transdermal every 72 hours    MEDICATIONS  (PRN):  dextrose Gel 1 Dose(s) Oral once PRN Blood Glucose LESS THAN 70 milliGRAM(s)/deciliter  glucagon  Injectable 1 milliGRAM(s) IntraMuscular once PRN Glucose LESS THAN 70 milligrams/deciliter  QUEtiapine 25 milliGRAM(s) Oral every 4 hours PRN For agitation  artificial  tears Solution 1 Drop(s) Both EYES four times a day PRN Dry Eyes  polyethylene glycol 3350 17 Gram(s) Oral daily PRN Constipation  acetaminophen    Suspension. 650 milliGRAM(s) Oral every 6 hours PRN Mild Pain (1 - 3) and moderate pain  bisacodyl Suppository 10 milliGRAM(s) Rectal daily PRN Constipation  acetaminophen   Tablet. 650 milliGRAM(s) Oral every 6 hours PRN Moderate Pain (4 - 6)  acetaminophen  Suppository 650 milliGRAM(s) Rectal every 6 hours PRN For Temp greater than 38 C (100.4 F)      T(C): 36.8 (07-31-17 @ 15:36), Max: 36.9 (07-30-17 @ 21:27)  HR: 66 (07-31-17 @ 15:36) (66 - 67)  BP: 124/61 (07-31-17 @ 15:36) (124/61 - 143/70)  RR: 17 (07-31-17 @ 15:36) (17 - 18)  SpO2: 99% (07-31-17 @ 15:36) (97% - 99%)  CAPILLARY BLOOD GLUCOSE  167 (31 Jul 2017 12:22)  203 (31 Jul 2017 08:28)  142 (30 Jul 2017 21:57)  193 (30 Jul 2017 16:51)        I&O's Summary    30 Jul 2017 07:01  -  31 Jul 2017 07:00  --------------------------------------------------------  IN: 250 mL / OUT: 0 mL / NET: 250 mL        PHYSICAL EXAM:  GENERAL: NAD  CHEST/LUNG: non-labored in breathing; No wheeze  HEART: s1 s2  ABDOMEN: Soft, Bowel sounds present  EXTREMITIES:  2+ Peripheral Pulses, No clubbing, cyanosis, or edema  PSYCH: calm   NEUROLOGY: non-focal  SKIN: No rashes or lesions    LABS:                    RADIOLOGY & ADDITIONAL TESTS:    Imaging Personally Reviewed:    Consultant(s) Notes Reviewed:      Care Discussed with Consultants/Other Providers:

## 2017-08-01 PROCEDURE — 99232 SBSQ HOSP IP/OBS MODERATE 35: CPT

## 2017-08-01 RX ADMIN — SODIUM CHLORIDE 50 MILLILITER(S): 9 INJECTION INTRAMUSCULAR; INTRAVENOUS; SUBCUTANEOUS at 17:11

## 2017-08-01 RX ADMIN — Medication 250 MILLIGRAM(S): at 11:43

## 2017-08-01 RX ADMIN — Medication 75 MICROGRAM(S): at 06:07

## 2017-08-01 RX ADMIN — Medication 1 MILLIGRAM(S): at 20:47

## 2017-08-01 RX ADMIN — Medication 1: at 12:47

## 2017-08-01 RX ADMIN — Medication 1: at 17:11

## 2017-08-01 RX ADMIN — Medication 2: at 08:41

## 2017-08-01 RX ADMIN — Medication 250 MILLIGRAM(S): at 06:07

## 2017-08-01 RX ADMIN — ATORVASTATIN CALCIUM 10 MILLIGRAM(S): 80 TABLET, FILM COATED ORAL at 20:47

## 2017-08-01 RX ADMIN — SERTRALINE 50 MILLIGRAM(S): 25 TABLET, FILM COATED ORAL at 11:43

## 2017-08-01 RX ADMIN — Medication 325 MILLIGRAM(S): at 11:43

## 2017-08-01 RX ADMIN — Medication 250 MILLIGRAM(S): at 20:46

## 2017-08-01 NOTE — PROGRESS NOTE ADULT - SUBJECTIVE AND OBJECTIVE BOX
Patient is a 81y old  Female who presents with a chief complaint of Diarrhea (07 Jul 2017 10:50)      SUBJECTIVE / OVERNIGHT EVENTS:  clinically unchanged. Pt is confused. no event.     MEDICATIONS  (STANDING):  sertraline 50 milliGRAM(s) Oral daily  atorvastatin 10 milliGRAM(s) Oral at bedtime  ferrous    sulfate 325 milliGRAM(s) Oral daily  levothyroxine 75 MICROGram(s) Oral daily  insulin lispro (HumaLOG) corrective regimen sliding scale   SubCutaneous three times a day before meals  insulin lispro (HumaLOG) corrective regimen sliding scale   SubCutaneous at bedtime  dextrose 5%. 1000 milliLiter(s) (50 mL/Hr) IV Continuous <Continuous>  dextrose 50% Injectable 12.5 Gram(s) IV Push once  dextrose 50% Injectable 25 Gram(s) IV Push once  dextrose 50% Injectable 25 Gram(s) IV Push once  sodium chloride 0.9%. 1000 milliLiter(s) (50 mL/Hr) IV Continuous <Continuous>  valproic  acid Syrup 250 milliGRAM(s) Oral three times a day  doxazosin 1 milliGRAM(s) Oral at bedtime  fentaNYL   Patch  25 MICROgram(s)/Hr 1 Patch Transdermal every 72 hours    MEDICATIONS  (PRN):  dextrose Gel 1 Dose(s) Oral once PRN Blood Glucose LESS THAN 70 milliGRAM(s)/deciliter  glucagon  Injectable 1 milliGRAM(s) IntraMuscular once PRN Glucose LESS THAN 70 milligrams/deciliter  QUEtiapine 25 milliGRAM(s) Oral every 4 hours PRN For agitation  artificial  tears Solution 1 Drop(s) Both EYES four times a day PRN Dry Eyes  polyethylene glycol 3350 17 Gram(s) Oral daily PRN Constipation  acetaminophen    Suspension. 650 milliGRAM(s) Oral every 6 hours PRN Mild Pain (1 - 3) and moderate pain  bisacodyl Suppository 10 milliGRAM(s) Rectal daily PRN Constipation  acetaminophen   Tablet. 650 milliGRAM(s) Oral every 6 hours PRN Moderate Pain (4 - 6)  acetaminophen  Suppository 650 milliGRAM(s) Rectal every 6 hours PRN For Temp greater than 38 C (100.4 F)      T(C): 36.4 (08-01-17 @ 13:03), Max: 36.8 (07-31-17 @ 15:36)  HR: 68 (08-01-17 @ 13:03) (66 - 73)  BP: 127/64 (08-01-17 @ 13:03) (124/61 - 146/77)  RR: 20 (08-01-17 @ 13:03) (17 - 20)  SpO2: 90% (08-01-17 @ 13:03) (90% - 99%)  CAPILLARY BLOOD GLUCOSE  198 (01 Aug 2017 12:18)  204 (01 Aug 2017 08:33)  189 (31 Jul 2017 22:12)  170 (31 Jul 2017 17:06)        I&O's Summary      PHYSICAL EXAM:  GENERAL: NAD, well-developed  HEAD:  Atraumatic, Normocephalic  CHEST/LUNG: non-labored in breathing; No wheeze  HEART: s1 s2, regular rhythm and rate   ABDOMEN: Soft, Nondistended; Bowel sounds present  EXTREMITIES:  No clubbing, cyanosis, or edema  PSYCH: calm   NEUROLOGY: non-focal  SKIN: No rashes or lesions    LABS:                    RADIOLOGY & ADDITIONAL TESTS:    Imaging Personally Reviewed:    Consultant(s) Notes Reviewed:      Care Discussed with Consultants/Other Providers:

## 2017-08-02 PROCEDURE — 99232 SBSQ HOSP IP/OBS MODERATE 35: CPT

## 2017-08-02 RX ORDER — ACETAMINOPHEN 500 MG
20.31 TABLET ORAL
Qty: 0 | Refills: 0 | COMMUNITY
Start: 2017-08-02

## 2017-08-02 RX ORDER — QUETIAPINE FUMARATE 200 MG/1
1 TABLET, FILM COATED ORAL
Qty: 0 | Refills: 0 | COMMUNITY

## 2017-08-02 RX ORDER — QUETIAPINE FUMARATE 200 MG/1
1 TABLET, FILM COATED ORAL
Qty: 0 | Refills: 0 | COMMUNITY
Start: 2017-08-02

## 2017-08-02 RX ORDER — ACETAMINOPHEN 500 MG
2 TABLET ORAL
Qty: 0 | Refills: 0 | COMMUNITY
Start: 2017-08-02

## 2017-08-02 RX ORDER — ACETAMINOPHEN 500 MG
1 TABLET ORAL
Qty: 0 | Refills: 0 | COMMUNITY
Start: 2017-08-02

## 2017-08-02 RX ADMIN — Medication 250 MILLIGRAM(S): at 23:02

## 2017-08-02 RX ADMIN — Medication 2: at 17:26

## 2017-08-02 RX ADMIN — FENTANYL CITRATE 1 PATCH: 50 INJECTION INTRAVENOUS at 23:02

## 2017-08-02 RX ADMIN — Medication 250 MILLIGRAM(S): at 06:00

## 2017-08-02 RX ADMIN — Medication 325 MILLIGRAM(S): at 13:18

## 2017-08-02 RX ADMIN — Medication 650 MILLIGRAM(S): at 22:54

## 2017-08-02 RX ADMIN — Medication 2: at 08:45

## 2017-08-02 RX ADMIN — Medication 1: at 13:08

## 2017-08-02 RX ADMIN — Medication 1 MILLIGRAM(S): at 23:02

## 2017-08-02 RX ADMIN — FENTANYL CITRATE 1 PATCH: 50 INJECTION INTRAVENOUS at 22:59

## 2017-08-02 RX ADMIN — SERTRALINE 50 MILLIGRAM(S): 25 TABLET, FILM COATED ORAL at 13:18

## 2017-08-02 RX ADMIN — Medication 250 MILLIGRAM(S): at 13:18

## 2017-08-02 RX ADMIN — Medication 75 MICROGRAM(S): at 06:00

## 2017-08-02 NOTE — PROVIDER CONTACT NOTE (OTHER) - NAME OF MD/NP/PA/DO NOTIFIED:
ADS Foskin, Maria Luisa
Annabel Strauss, PA
DANIA Vasques
Layo NAJERA
Norberto Guerra NP, ADS
Viviana Dhaliwal NP
Viviana Dhaliwal NP, ADS [id number]
Viviana Dhaliwal, NP, ADS
Viviana Dhaliwal NP

## 2017-08-02 NOTE — PROVIDER CONTACT NOTE (OTHER) - SITUATION
Pt T 100
Intermittent Cauterization completed
P-113, coughing up phlegm after taking PO meds crushed in thickened water
Patient was taking medication as normal. Patient began coughing after administration. Family is concerned.
Pt family refused pt blood work to be drawn at this time for CBC and Type & Screen
Pt's temp was 100.6 axillarry, 101.6 rectally
pt temp 100.4
Patient's Blood transfusion Completed at 21:00
Patient's Nephrostomy tube has not drained for the past 3 hours

## 2017-08-02 NOTE — PROVIDER CONTACT NOTE (OTHER) - ASSESSMENT
Pt T 100
Output 525.
Patient lungs sound clear upon auscultation.
Pt agitated; PRN dose of Seroquel PO given
Patient A + O x's 1. Unable to verbalize needs. Blood transfusion completed. When should CBC be obtained.
Pt alert, responsive, confused. no signs of acute distress. feels warm to touch no chills noted
Tachycardic, low grade temp 99.7 axillary, refusing oral temp
vss except for temp 100.4
Patient is A + O x's 0. Appears to be going through mild pain. unable to verbalize needs. For the past 3 hours, no output from nephrostomy tube. minimum bleeding. Daughter concerned.

## 2017-08-02 NOTE — PROGRESS NOTE ADULT - PROBLEM SELECTOR PLAN 1
Family does not want aggressive measures. Family doesn't want bx or any surgical intervention  Family is aware that overall prognosis is poor

## 2017-08-02 NOTE — PROVIDER CONTACT NOTE (OTHER) - BACKGROUND
Pt admitted for diarrhea,   no s/s of sweating or chills
81 y.o female with HTN, DM, advanced dementia, DVT on Eliquis.
Dx Diarrhea
Pt admitted with diarrhea, hydronephrosis 2/2 renal mass, s/p nephrostomy removal
Patient went into IR for nephrostomy tube replacement. Transferred to unit at 20:00.
Blood transfusion started at 16:00

## 2017-08-02 NOTE — PROVIDER CONTACT NOTE (OTHER) - ACTION/TREATMENT ORDERED:
Continue to monitor patient. Inform provider if no changes by morning.
Retake CBC at 23:00
Continue to monitor patient.
PRN dose of Seroquel PO given; provider notified; blood to be drawn at a later time when pt calms down
will reassess in a hr
Will give patient nectar fluids. Will wait for xray results. Will suction PRN.
PA seen & assessed pt, O2 2LNC ordered & placed. Will continue to monitor. Instructed family on aspiration precautions. Will continue to moniotor
Will give Tylenol suppository.
tylenol suppository given, will send blood cultures as ordered. will continue to monitor

## 2017-08-02 NOTE — PROGRESS NOTE ADULT - SUBJECTIVE AND OBJECTIVE BOX
Patient is a 81y old  Female who presents with a chief complaint of Diarrhea (07 Jul 2017 10:50)      SUBJECTIVE / OVERNIGHT EVENTS:  No event.     MEDICATIONS  (STANDING):  sertraline 50 milliGRAM(s) Oral daily  atorvastatin 10 milliGRAM(s) Oral at bedtime  ferrous    sulfate 325 milliGRAM(s) Oral daily  levothyroxine 75 MICROGram(s) Oral daily  insulin lispro (HumaLOG) corrective regimen sliding scale   SubCutaneous three times a day before meals  insulin lispro (HumaLOG) corrective regimen sliding scale   SubCutaneous at bedtime  dextrose 5%. 1000 milliLiter(s) (50 mL/Hr) IV Continuous <Continuous>  dextrose 50% Injectable 12.5 Gram(s) IV Push once  dextrose 50% Injectable 25 Gram(s) IV Push once  dextrose 50% Injectable 25 Gram(s) IV Push once  sodium chloride 0.9%. 1000 milliLiter(s) (50 mL/Hr) IV Continuous <Continuous>  valproic  acid Syrup 250 milliGRAM(s) Oral three times a day  doxazosin 1 milliGRAM(s) Oral at bedtime  fentaNYL   Patch  25 MICROgram(s)/Hr 1 Patch Transdermal every 72 hours    MEDICATIONS  (PRN):  dextrose Gel 1 Dose(s) Oral once PRN Blood Glucose LESS THAN 70 milliGRAM(s)/deciliter  glucagon  Injectable 1 milliGRAM(s) IntraMuscular once PRN Glucose LESS THAN 70 milligrams/deciliter  QUEtiapine 25 milliGRAM(s) Oral every 4 hours PRN For agitation  artificial  tears Solution 1 Drop(s) Both EYES four times a day PRN Dry Eyes  polyethylene glycol 3350 17 Gram(s) Oral daily PRN Constipation  acetaminophen    Suspension. 650 milliGRAM(s) Oral every 6 hours PRN Mild Pain (1 - 3) and moderate pain  bisacodyl Suppository 10 milliGRAM(s) Rectal daily PRN Constipation  acetaminophen   Tablet. 650 milliGRAM(s) Oral every 6 hours PRN Moderate Pain (4 - 6)  acetaminophen  Suppository 650 milliGRAM(s) Rectal every 6 hours PRN For Temp greater than 38 C (100.4 F)      T(C): 37.1 (08-02-17 @ 05:07), Max: 38 (08-01-17 @ 21:25)  HR: 79 (08-02-17 @ 05:07) (68 - 79)  BP: 154/77 (08-02-17 @ 05:07) (127/64 - 154/77)  RR: 16 (08-02-17 @ 05:07) (16 - 20)  SpO2: 92% (08-02-17 @ 05:07) (90% - 92%)  CAPILLARY BLOOD GLUCOSE  220 (02 Aug 2017 08:38)  151 (01 Aug 2017 21:25)  160 (01 Aug 2017 16:57)        I&O's Summary      PHYSICAL EXAM:  GENERAL: NAD, well-developed  HEAD:  Atraumatic, Normocephalic  CHEST/LUNG: non-labored in breathing; No wheeze  HEART: s1 s2, regular rhythm and rate   ABDOMEN: Soft, Nondistended; Bowel sounds present  EXTREMITIES:  2+ Peripheral Pulses, No clubbing, cyanosis, or edema  PSYCH: calm, confused   NEUROLOGY: non-focal  SKIN: No rashes or lesions    LABS:                    RADIOLOGY & ADDITIONAL TESTS:    Imaging Personally Reviewed:    Consultant(s) Notes Reviewed:      Care Discussed with Consultants/Other Providers: Patient is a 81y old  Female who presents with a chief complaint of Diarrhea (07 Jul 2017 10:50)      SUBJECTIVE / OVERNIGHT EVENTS:  No event.     MEDICATIONS  (STANDING):  sertraline 50 milliGRAM(s) Oral daily  atorvastatin 10 milliGRAM(s) Oral at bedtime  ferrous    sulfate 325 milliGRAM(s) Oral daily  levothyroxine 75 MICROGram(s) Oral daily  insulin lispro (HumaLOG) corrective regimen sliding scale   SubCutaneous three times a day before meals  insulin lispro (HumaLOG) corrective regimen sliding scale   SubCutaneous at bedtime  dextrose 5%. 1000 milliLiter(s) (50 mL/Hr) IV Continuous <Continuous>  dextrose 50% Injectable 12.5 Gram(s) IV Push once  dextrose 50% Injectable 25 Gram(s) IV Push once  dextrose 50% Injectable 25 Gram(s) IV Push once  sodium chloride 0.9%. 1000 milliLiter(s) (50 mL/Hr) IV Continuous <Continuous>  valproic  acid Syrup 250 milliGRAM(s) Oral three times a day  doxazosin 1 milliGRAM(s) Oral at bedtime  fentaNYL   Patch  25 MICROgram(s)/Hr 1 Patch Transdermal every 72 hours    MEDICATIONS  (PRN):  dextrose Gel 1 Dose(s) Oral once PRN Blood Glucose LESS THAN 70 milliGRAM(s)/deciliter  glucagon  Injectable 1 milliGRAM(s) IntraMuscular once PRN Glucose LESS THAN 70 milligrams/deciliter  QUEtiapine 25 milliGRAM(s) Oral every 4 hours PRN For agitation  artificial  tears Solution 1 Drop(s) Both EYES four times a day PRN Dry Eyes  polyethylene glycol 3350 17 Gram(s) Oral daily PRN Constipation  acetaminophen    Suspension. 650 milliGRAM(s) Oral every 6 hours PRN Mild Pain (1 - 3) and moderate pain  bisacodyl Suppository 10 milliGRAM(s) Rectal daily PRN Constipation  acetaminophen   Tablet. 650 milliGRAM(s) Oral every 6 hours PRN Moderate Pain (4 - 6)  acetaminophen  Suppository 650 milliGRAM(s) Rectal every 6 hours PRN For Temp greater than 38 C (100.4 F)      T(C): 37.1 (08-02-17 @ 05:07), Max: 38 (08-01-17 @ 21:25)  HR: 79 (08-02-17 @ 05:07) (68 - 79)  BP: 154/77 (08-02-17 @ 05:07) (127/64 - 154/77)  RR: 16 (08-02-17 @ 05:07) (16 - 20)  SpO2: 92% (08-02-17 @ 05:07) (90% - 92%)  CAPILLARY BLOOD GLUCOSE  220 (02 Aug 2017 08:38)  151 (01 Aug 2017 21:25)  160 (01 Aug 2017 16:57)        I&O's Summary      PHYSICAL EXAM:  GENERAL: NAD, well-developed  HEAD:  Atraumatic, Normocephalic  CHEST/LUNG: non-labored in breathing; No wheeze  HEART: s1 s2, regular rhythm and rate   ABDOMEN: Soft, Nondistended; Bowel sounds present  EXTREMITIES:  2+ Peripheral Pulses, No clubbing, cyanosis, or edema  PSYCH: calm, confused   NEUROLOGY: non-focal  SKIN: No rashes or lesions    LABS:                    RADIOLOGY & ADDITIONAL TESTS:    Imaging Personally Reviewed:    Consultant(s) Notes Reviewed:      Care Discussed with Consultants/Other Providers: Dr. William (Palliative care) regarding GOC of pt

## 2017-08-02 NOTE — PROVIDER CONTACT NOTE (OTHER) - REASON
Patient took medication, started coughing
Pt T 100
Pt coughing after taking PO meds, tachycardic 113, emp 99.7 axillary
Pt family refused pt blood to be drawn at this time for CBC and Type & Screen
fever
Intermittent Cauterization completed
Patient's Blood transfusion Completed at 21:00
Patient's Nephrostomy tube has not drained for the past 3 hours
Pt temp 100.4

## 2017-08-02 NOTE — PROVIDER CONTACT NOTE (OTHER) - DATE AND TIME:
02-Aug-2017 22:12
14-Jul-2017 22:47
15-Jul-2017 06:53
16-Jul-2017 21:25
15-Jul-2017 18:56
15-Jul-2017 21:30
18-Jul-2017 21:00
26-Jul-2017 22:40
03-Jul-2017 06:15

## 2017-08-02 NOTE — PROVIDER CONTACT NOTE (OTHER) - RECOMMENDATIONS
Reassess
Diet changed to nectar consistency. X-Ray of chest ordered. Suctioning PRN ordered.
PA to jahaira pt
Will give Tylenol suppository.
medicate pt

## 2017-08-03 DIAGNOSIS — R50.9 FEVER, UNSPECIFIED: ICD-10-CM

## 2017-08-03 LAB
APPEARANCE UR: SIGNIFICANT CHANGE UP
BACTERIA # UR AUTO: SIGNIFICANT CHANGE UP
BILIRUB UR-MCNC: NEGATIVE — SIGNIFICANT CHANGE UP
BLOOD UR QL VISUAL: HIGH
COLOR SPEC: YELLOW — SIGNIFICANT CHANGE UP
GLUCOSE UR-MCNC: 50 — SIGNIFICANT CHANGE UP
KETONES UR-MCNC: NEGATIVE — SIGNIFICANT CHANGE UP
LEUKOCYTE ESTERASE UR-ACNC: HIGH
MUCOUS THREADS # UR AUTO: SIGNIFICANT CHANGE UP
NITRITE UR-MCNC: POSITIVE — HIGH
NON-SQ EPI CELLS # UR AUTO: 1 — SIGNIFICANT CHANGE UP
PH UR: 6.5 — SIGNIFICANT CHANGE UP (ref 4.6–8)
PROT UR-MCNC: 100 — HIGH
RBC CASTS # UR COMP ASSIST: HIGH (ref 0–?)
SP GR SPEC: 1.02 — SIGNIFICANT CHANGE UP (ref 1–1.03)
SQUAMOUS # UR AUTO: SIGNIFICANT CHANGE UP
UROBILINOGEN FLD QL: NORMAL E.U. — SIGNIFICANT CHANGE UP (ref 0.1–0.2)
WBC UR QL: >50 — HIGH (ref 0–?)

## 2017-08-03 PROCEDURE — 71010: CPT | Mod: 26

## 2017-08-03 PROCEDURE — 71250 CT THORAX DX C-: CPT | Mod: 26

## 2017-08-03 PROCEDURE — 99232 SBSQ HOSP IP/OBS MODERATE 35: CPT

## 2017-08-03 RX ADMIN — Medication 325 MILLIGRAM(S): at 13:30

## 2017-08-03 RX ADMIN — Medication 75 MICROGRAM(S): at 06:33

## 2017-08-03 RX ADMIN — Medication 0: at 23:05

## 2017-08-03 RX ADMIN — Medication 250 MILLIGRAM(S): at 13:31

## 2017-08-03 RX ADMIN — SODIUM CHLORIDE 50 MILLILITER(S): 9 INJECTION INTRAMUSCULAR; INTRAVENOUS; SUBCUTANEOUS at 00:06

## 2017-08-03 RX ADMIN — Medication: at 17:10

## 2017-08-03 RX ADMIN — Medication 250 MILLIGRAM(S): at 06:33

## 2017-08-03 RX ADMIN — Medication 1 MILLIGRAM(S): at 23:05

## 2017-08-03 RX ADMIN — SERTRALINE 50 MILLIGRAM(S): 25 TABLET, FILM COATED ORAL at 13:31

## 2017-08-03 RX ADMIN — Medication 2: at 08:29

## 2017-08-03 RX ADMIN — Medication 250 MILLIGRAM(S): at 23:05

## 2017-08-03 RX ADMIN — Medication: at 13:31

## 2017-08-03 NOTE — PROGRESS NOTE ADULT - ATTENDING COMMENTS
I have an extensive discussion with her daughter Vera at bedside. She is aware that the overall prognosis is poor with advanced dementia, progressing kidney mass, functional quadriplegia, poor nutrition.   Plan for home hospice when pt is stable.

## 2017-08-03 NOTE — PROGRESS NOTE ADULT - PROBLEM SELECTOR PLAN 1
non-infectious likely kidney mass, atelectasis  follow up blood cx, urine cx to rule out infection   CXR showed Left basilar and retrocardiac opacity, can't exclude PNA. will do CT-chest

## 2017-08-03 NOTE — PROGRESS NOTE ADULT - SUBJECTIVE AND OBJECTIVE BOX
Patient is a 81y old  Female who presents with a chief complaint of Diarrhea (2017 10:50)      SUBJECTIVE / OVERNIGHT EVENTS:  One episode of fever last night. Pt is confused at baseline     MEDICATIONS  (STANDING):  sertraline 50 milliGRAM(s) Oral daily  atorvastatin 10 milliGRAM(s) Oral at bedtime  ferrous    sulfate 325 milliGRAM(s) Oral daily  levothyroxine 75 MICROGram(s) Oral daily  insulin lispro (HumaLOG) corrective regimen sliding scale   SubCutaneous three times a day before meals  insulin lispro (HumaLOG) corrective regimen sliding scale   SubCutaneous at bedtime  dextrose 5%. 1000 milliLiter(s) (50 mL/Hr) IV Continuous <Continuous>  dextrose 50% Injectable 12.5 Gram(s) IV Push once  dextrose 50% Injectable 25 Gram(s) IV Push once  dextrose 50% Injectable 25 Gram(s) IV Push once  sodium chloride 0.9%. 1000 milliLiter(s) (50 mL/Hr) IV Continuous <Continuous>  valproic  acid Syrup 250 milliGRAM(s) Oral three times a day  doxazosin 1 milliGRAM(s) Oral at bedtime    MEDICATIONS  (PRN):  dextrose Gel 1 Dose(s) Oral once PRN Blood Glucose LESS THAN 70 milliGRAM(s)/deciliter  glucagon  Injectable 1 milliGRAM(s) IntraMuscular once PRN Glucose LESS THAN 70 milligrams/deciliter  QUEtiapine 25 milliGRAM(s) Oral every 4 hours PRN For agitation  artificial  tears Solution 1 Drop(s) Both EYES four times a day PRN Dry Eyes  polyethylene glycol 3350 17 Gram(s) Oral daily PRN Constipation  acetaminophen    Suspension. 650 milliGRAM(s) Oral every 6 hours PRN Mild Pain (1 - 3) and moderate pain  bisacodyl Suppository 10 milliGRAM(s) Rectal daily PRN Constipation  acetaminophen   Tablet. 650 milliGRAM(s) Oral every 6 hours PRN Moderate Pain (4 - 6)  acetaminophen  Suppository 650 milliGRAM(s) Rectal every 6 hours PRN For Temp greater than 38 C (100.4 F)      T(C): 36.8 (17 @ 13:29), Max: 38 (17 @ 22:00)  HR: 71 (17 @ 13:29) (69 - 72)  BP: 133/69 (17 @ 13:29) (133/69 - 141/65)  RR: 18 (17 @ 13:29) (18 - 18)  SpO2: 98% (17 @ 13:29) (95% - 98%)  CAPILLARY BLOOD GLUCOSE  194 (03 Aug 2017 12:20)  236 (03 Aug 2017 08:21)  179 (02 Aug 2017 22:38)  201 (02 Aug 2017 17:10)        I&O's Summary      PHYSICAL EXAM:  GENERAL: NAD, well-developed  HEAD:  Atraumatic, Normocephalic  CHEST/LUNG: Clear to auscultation bilaterally; good air movement. No wheeze  HEART: s1 s2, regular rhythm and rate   ABDOMEN: Soft, Nontender, Nondistended; Bowel sounds present  EXTREMITIES:  No clubbing, cyanosis, or edema  PSYCH: calm   NEUROLOGY: non-focal  SKIN: No rashes or lesions    LABS:                Urinalysis Basic - ( 03 Aug 2017 13:30 )    Color: YELLOW / Appearance: TURBID / S.017 / pH: 6.5  Gluc: 50 / Ketone: NEGATIVE  / Bili: NEGATIVE / Urobili: NORMAL E.U.   Blood: MODERATE / Protein: 100 / Nitrite: POSITIVE   Leuk Esterase: LARGE / RBC: 5-10 / WBC >50   Sq Epi: FEW / Non Sq Epi: x / Bacteria: FEW        RADIOLOGY & ADDITIONAL TESTS:    Imaging Personally Reviewed:< from: Xray Chest 1 View AP -PORTABLE-Routine (17 @ 13:30) >    IMPRESSION:  Left basilar and retrocardiac opacity which may be due to a   small pleural effusion with passive atelectasis. Atelectasis of other   cause, and/or pneumonia is not excluded.    < end of copied text >      Consultant(s) Notes Reviewed:      Care Discussed with Consultants/Other Providers:

## 2017-08-04 LAB
SPECIMEN SOURCE: SIGNIFICANT CHANGE UP

## 2017-08-04 PROCEDURE — 99232 SBSQ HOSP IP/OBS MODERATE 35: CPT

## 2017-08-04 RX ORDER — CEFTRIAXONE 500 MG/1
INJECTION, POWDER, FOR SOLUTION INTRAMUSCULAR; INTRAVENOUS
Qty: 0 | Refills: 0 | Status: DISCONTINUED | OUTPATIENT
Start: 2017-08-04 | End: 2017-08-05

## 2017-08-04 RX ORDER — CEFTRIAXONE 500 MG/1
1 INJECTION, POWDER, FOR SOLUTION INTRAMUSCULAR; INTRAVENOUS ONCE
Qty: 0 | Refills: 0 | Status: COMPLETED | OUTPATIENT
Start: 2017-08-04 | End: 2017-08-04

## 2017-08-04 RX ORDER — CEFTRIAXONE 500 MG/1
1 INJECTION, POWDER, FOR SOLUTION INTRAMUSCULAR; INTRAVENOUS EVERY 24 HOURS
Qty: 0 | Refills: 0 | Status: DISCONTINUED | OUTPATIENT
Start: 2017-08-05 | End: 2017-08-05

## 2017-08-04 RX ADMIN — SODIUM CHLORIDE 50 MILLILITER(S): 9 INJECTION INTRAMUSCULAR; INTRAVENOUS; SUBCUTANEOUS at 12:00

## 2017-08-04 RX ADMIN — Medication 3: at 09:05

## 2017-08-04 RX ADMIN — Medication 1: at 12:06

## 2017-08-04 RX ADMIN — Medication 250 MILLIGRAM(S): at 14:07

## 2017-08-04 RX ADMIN — Medication 250 MILLIGRAM(S): at 06:48

## 2017-08-04 RX ADMIN — Medication 2: at 17:43

## 2017-08-04 RX ADMIN — Medication 75 MICROGRAM(S): at 06:48

## 2017-08-04 RX ADMIN — SERTRALINE 50 MILLIGRAM(S): 25 TABLET, FILM COATED ORAL at 12:00

## 2017-08-04 RX ADMIN — Medication 1 MILLIGRAM(S): at 22:49

## 2017-08-04 RX ADMIN — Medication 250 MILLIGRAM(S): at 22:49

## 2017-08-04 RX ADMIN — Medication 325 MILLIGRAM(S): at 12:00

## 2017-08-04 RX ADMIN — CEFTRIAXONE 100 GRAM(S): 500 INJECTION, POWDER, FOR SOLUTION INTRAMUSCULAR; INTRAVENOUS at 12:00

## 2017-08-04 NOTE — PROGRESS NOTE ADULT - PROBLEM SELECTOR PLAN 1
Likely due to UTI. c/w ceftriaxone. follow up urine cx. Pt afebrile now  follow up blood cx, negative till date   CT-chest no signs of PNA

## 2017-08-04 NOTE — PROGRESS NOTE ADULT - SUBJECTIVE AND OBJECTIVE BOX
Patient is a 81y old  Female who presents with a chief complaint of Diarrhea (2017 10:50)      SUBJECTIVE / OVERNIGHT EVENTS:  Pt is confused at baseline. No event     MEDICATIONS  (STANDING):  sertraline 50 milliGRAM(s) Oral daily  atorvastatin 10 milliGRAM(s) Oral at bedtime  ferrous    sulfate 325 milliGRAM(s) Oral daily  levothyroxine 75 MICROGram(s) Oral daily  insulin lispro (HumaLOG) corrective regimen sliding scale   SubCutaneous three times a day before meals  insulin lispro (HumaLOG) corrective regimen sliding scale   SubCutaneous at bedtime  dextrose 5%. 1000 milliLiter(s) (50 mL/Hr) IV Continuous <Continuous>  dextrose 50% Injectable 12.5 Gram(s) IV Push once  dextrose 50% Injectable 25 Gram(s) IV Push once  dextrose 50% Injectable 25 Gram(s) IV Push once  sodium chloride 0.9%. 1000 milliLiter(s) (50 mL/Hr) IV Continuous <Continuous>  valproic  acid Syrup 250 milliGRAM(s) Oral three times a day  doxazosin 1 milliGRAM(s) Oral at bedtime  cefTRIAXone   IVPB   IV Intermittent     MEDICATIONS  (PRN):  dextrose Gel 1 Dose(s) Oral once PRN Blood Glucose LESS THAN 70 milliGRAM(s)/deciliter  glucagon  Injectable 1 milliGRAM(s) IntraMuscular once PRN Glucose LESS THAN 70 milligrams/deciliter  QUEtiapine 25 milliGRAM(s) Oral every 4 hours PRN For agitation  artificial  tears Solution 1 Drop(s) Both EYES four times a day PRN Dry Eyes  polyethylene glycol 3350 17 Gram(s) Oral daily PRN Constipation  acetaminophen    Suspension. 650 milliGRAM(s) Oral every 6 hours PRN Mild Pain (1 - 3) and moderate pain  bisacodyl Suppository 10 milliGRAM(s) Rectal daily PRN Constipation  acetaminophen   Tablet. 650 milliGRAM(s) Oral every 6 hours PRN Moderate Pain (4 - 6)  acetaminophen  Suppository 650 milliGRAM(s) Rectal every 6 hours PRN For Temp greater than 38 C (100.4 F)      T(C): 36.9 (17 @ 13:21), Max: 37 (17 @ 22:02)  HR: 85 (17 @ 13:21) (76 - 89)  BP: 117/62 (17 @ 13:21) (117/62 - 136/69)  RR: 18 (17 @ 13:21) (18 - 18)  SpO2: 93% (17 @ 13:21) (93% - 96%)  CAPILLARY BLOOD GLUCOSE  177 (04 Aug 2017 12:04)  298 (04 Aug 2017 08:31)  176 (03 Aug 2017 22:02)  186 (03 Aug 2017 16:47)        I&O's Summary      PHYSICAL EXAM:  GENERAL: NAD  HEAD:  Atraumatic, Normocephalic  CHEST/LUNG: non-labored in breathing; No wheeze  HEART: s1 s2  ABDOMEN: Soft, Nontender, Nondistended; Bowel sounds present  EXTREMITIES: No clubbing, cyanosis, or edema  PSYCH: AAOx0, calm   NEUROLOGY: non-focal  SKIN: No rashes or lesions    LABS:                Urinalysis Basic - ( 03 Aug 2017 13:30 )    Color: YELLOW / Appearance: TURBID / S.017 / pH: 6.5  Gluc: 50 / Ketone: NEGATIVE  / Bili: NEGATIVE / Urobili: NORMAL E.U.   Blood: MODERATE / Protein: 100 / Nitrite: POSITIVE   Leuk Esterase: LARGE / RBC: 5-10 / WBC >50   Sq Epi: FEW / Non Sq Epi: x / Bacteria: FEW        RADIOLOGY & ADDITIONAL TESTS:    Imaging Personally Reviewed:    Consultant(s) Notes Reviewed:      Care Discussed with Consultants/Other Providers:

## 2017-08-05 VITALS
RESPIRATION RATE: 18 BRPM | TEMPERATURE: 98 F | HEART RATE: 88 BPM | SYSTOLIC BLOOD PRESSURE: 122 MMHG | DIASTOLIC BLOOD PRESSURE: 66 MMHG | OXYGEN SATURATION: 91 %

## 2017-08-05 LAB
-  AMIKACIN: SIGNIFICANT CHANGE UP
-  AZTREONAM: SIGNIFICANT CHANGE UP
-  CEFEPIME: SIGNIFICANT CHANGE UP
-  CEFTAZIDIME: SIGNIFICANT CHANGE UP
-  CIPROFLOXACIN: SIGNIFICANT CHANGE UP
-  GENTAMICIN: SIGNIFICANT CHANGE UP
-  IMIPENEM: SIGNIFICANT CHANGE UP
-  LEVOFLOXACIN: SIGNIFICANT CHANGE UP
-  MEROPENEM: SIGNIFICANT CHANGE UP
-  PIPERACILLIN/TAZOBACTAM: SIGNIFICANT CHANGE UP
-  TOBRAMYCIN: SIGNIFICANT CHANGE UP
BACTERIA UR CULT: SIGNIFICANT CHANGE UP
METHOD TYPE: SIGNIFICANT CHANGE UP
ORGANISM # SPEC MICROSCOPIC CNT: SIGNIFICANT CHANGE UP
ORGANISM # SPEC MICROSCOPIC CNT: SIGNIFICANT CHANGE UP

## 2017-08-05 PROCEDURE — 99239 HOSP IP/OBS DSCHRG MGMT >30: CPT

## 2017-08-05 RX ORDER — FENTANYL CITRATE 50 UG/ML
1 INJECTION INTRAVENOUS
Qty: 0 | Refills: 0 | Status: DISCONTINUED | OUTPATIENT
Start: 2017-08-05 | End: 2017-08-05

## 2017-08-05 RX ORDER — CEFUROXIME AXETIL 250 MG
1 TABLET ORAL
Qty: 8 | Refills: 0 | OUTPATIENT
Start: 2017-08-05 | End: 2017-08-09

## 2017-08-05 RX ORDER — POLYETHYLENE GLYCOL 3350 17 G/17G
17 POWDER, FOR SOLUTION ORAL
Qty: 0 | Refills: 0 | COMMUNITY
Start: 2017-08-05

## 2017-08-05 RX ORDER — DOXAZOSIN MESYLATE 4 MG
1 TABLET ORAL
Qty: 30 | Refills: 0 | OUTPATIENT
Start: 2017-08-05 | End: 2017-09-04

## 2017-08-05 RX ORDER — FENTANYL CITRATE 50 UG/ML
1 INJECTION INTRAVENOUS
Qty: 1 | Refills: 0 | OUTPATIENT
Start: 2017-08-05 | End: 2017-08-06

## 2017-08-05 RX ORDER — APIXABAN 2.5 MG/1
1 TABLET, FILM COATED ORAL
Qty: 0 | Refills: 0 | COMMUNITY

## 2017-08-05 RX ADMIN — Medication 325 MILLIGRAM(S): at 13:27

## 2017-08-05 RX ADMIN — Medication 75 MICROGRAM(S): at 05:02

## 2017-08-05 RX ADMIN — FENTANYL CITRATE 1 PATCH: 50 INJECTION INTRAVENOUS at 11:28

## 2017-08-05 RX ADMIN — CEFTRIAXONE 100 GRAM(S): 500 INJECTION, POWDER, FOR SOLUTION INTRAMUSCULAR; INTRAVENOUS at 09:46

## 2017-08-05 RX ADMIN — Medication 250 MILLIGRAM(S): at 05:02

## 2017-08-05 RX ADMIN — Medication 250 MILLIGRAM(S): at 13:27

## 2017-08-05 RX ADMIN — Medication 2: at 08:33

## 2017-08-05 RX ADMIN — Medication 2: at 13:27

## 2017-08-05 RX ADMIN — SERTRALINE 50 MILLIGRAM(S): 25 TABLET, FILM COATED ORAL at 13:27

## 2017-08-05 NOTE — PROGRESS NOTE ADULT - PROBLEM SELECTOR PROBLEM 1
Diarrhea of presumed infectious origin
Fever
Hematuria
Late onset Alzheimer's disease with behavioral disturbance
Leucocytosis
Renal mass, right
Sepsis
Late onset Alzheimer's disease with behavioral disturbance
Renal mass, right
Renal mass, right

## 2017-08-05 NOTE — PROGRESS NOTE ADULT - PROBLEM SELECTOR PROBLEM 7
Deep vein thrombosis (DVT) of lower extremity, unspecified chronicity, unspecified laterality, unspecified vein
Late onset Alzheimer's disease with behavioral disturbance
Renal mass, right

## 2017-08-05 NOTE — PROGRESS NOTE ADULT - PROBLEM SELECTOR PLAN 5
- continue Depakote and Zoloft  -pt on PRN Seroquel  for agitation  -functional quadriplegia : supportive care / assistance with ADLs  - Glucerna for dietary supplement   -skin care as per protocol
- continue Depakote and Zoloft  -pt on PRN Seroquel  for agitation  -functional quadriplegia : supportive care/assisstence with ADLs  - Glucerna for dietary supplement   -skin care as per protocol
- continue Depakote and Zoloft  -pt on PRN Seroquel  for agitation  -functional quadriplegia : supportive care/assisstence with ADLs  -skin care as per protocol
- continue Depakote and Zoloft  -pt on PRN Seroquel  for agitation  -functional quadriplegia : supportive care/assisstence with ADLs  -skin care as per protocol
- continue Eliquis
- continue synthroid
- hold metformin and glybuide  - humalog SS  -FSBS stable
2/2 to progression on dementia.  Supportive care.
Supportive care

## 2017-08-05 NOTE — PROGRESS NOTE ADULT - PROBLEM SELECTOR PROBLEM 5
Debility
Late onset Alzheimer's disease with behavioral disturbance
Deep vein thrombosis (DVT) of lower extremity, unspecified chronicity, unspecified laterality, unspecified vein
Hypothyroidism, unspecified type
Late onset Alzheimer's disease with behavioral disturbance
Type 2 diabetes mellitus without complication, without long-term current use of insulin
FTT (failure to thrive) in adult

## 2017-08-05 NOTE — PROGRESS NOTE ADULT - PROBLEM SELECTOR PROBLEM 6
Palliative care encounter
Deep vein thrombosis (DVT) of lower extremity, unspecified chronicity, unspecified laterality, unspecified vein
Late onset Alzheimer's disease with behavioral disturbance
Type 2 diabetes mellitus without complication, without long-term current use of insulin
Advanced care planning/counseling discussion
Deep vein thrombosis (DVT) of lower extremity, unspecified chronicity, unspecified laterality, unspecified vein

## 2017-08-05 NOTE — PROGRESS NOTE ADULT - SUBJECTIVE AND OBJECTIVE BOX
Patient is a 81y old  Female who presents with a chief complaint of Diarrhea (07 Jul 2017 10:50)      SUBJECTIVE / OVERNIGHT EVENTS:  no event. Pt confused at baseline.     MEDICATIONS  (STANDING):  sertraline 50 milliGRAM(s) Oral daily  atorvastatin 10 milliGRAM(s) Oral at bedtime  ferrous    sulfate 325 milliGRAM(s) Oral daily  levothyroxine 75 MICROGram(s) Oral daily  insulin lispro (HumaLOG) corrective regimen sliding scale   SubCutaneous three times a day before meals  insulin lispro (HumaLOG) corrective regimen sliding scale   SubCutaneous at bedtime  dextrose 5%. 1000 milliLiter(s) (50 mL/Hr) IV Continuous <Continuous>  dextrose 50% Injectable 12.5 Gram(s) IV Push once  dextrose 50% Injectable 25 Gram(s) IV Push once  dextrose 50% Injectable 25 Gram(s) IV Push once  sodium chloride 0.9%. 1000 milliLiter(s) (50 mL/Hr) IV Continuous <Continuous>  valproic  acid Syrup 250 milliGRAM(s) Oral three times a day  doxazosin 1 milliGRAM(s) Oral at bedtime  cefTRIAXone   IVPB   IV Intermittent   cefTRIAXone   IVPB 1 Gram(s) IV Intermittent every 24 hours  fentaNYL   Patch  12 MICROgram(s)/Hr 1 Patch Transdermal every 72 hours    MEDICATIONS  (PRN):  dextrose Gel 1 Dose(s) Oral once PRN Blood Glucose LESS THAN 70 milliGRAM(s)/deciliter  glucagon  Injectable 1 milliGRAM(s) IntraMuscular once PRN Glucose LESS THAN 70 milligrams/deciliter  QUEtiapine 25 milliGRAM(s) Oral every 4 hours PRN For agitation  artificial  tears Solution 1 Drop(s) Both EYES four times a day PRN Dry Eyes  polyethylene glycol 3350 17 Gram(s) Oral daily PRN Constipation  acetaminophen    Suspension. 650 milliGRAM(s) Oral every 6 hours PRN Mild Pain (1 - 3) and moderate pain  bisacodyl Suppository 10 milliGRAM(s) Rectal daily PRN Constipation  acetaminophen   Tablet. 650 milliGRAM(s) Oral every 6 hours PRN Moderate Pain (4 - 6)  acetaminophen  Suppository 650 milliGRAM(s) Rectal every 6 hours PRN For Temp greater than 38 C (100.4 F)      T(C): 36.9 (08-05-17 @ 12:56), Max: 37.7 (08-04-17 @ 21:02)  HR: 88 (08-05-17 @ 12:56) (81 - 88)  BP: 122/66 (08-05-17 @ 12:56) (119/76 - 125/64)  RR: 18 (08-05-17 @ 12:56) (18 - 18)  SpO2: 91% (08-05-17 @ 12:56) (90% - 91%)  CAPILLARY BLOOD GLUCOSE  210 (05 Aug 2017 12:02)  245 (05 Aug 2017 08:28)  173 (04 Aug 2017 21:02)  206 (04 Aug 2017 17:16)        I&O's Summary      PHYSICAL EXAM:  GENERAL: NAD, well-developed  CHEST/LUNG: non-labored in breathing  HEART: s1 s2  ABDOMEN: Soft, Nondistended; Bowel sounds present  EXTREMITIES:  No clubbing, cyanosis, or edema  PSYCH: calm   NEUROLOGY: somnolent   SKIN: No rashes or lesions    LABS:                    RADIOLOGY & ADDITIONAL TESTS:    Imaging Personally Reviewed:    Consultant(s) Notes Reviewed:      Care Discussed with Consultants/Other Providers:

## 2017-08-05 NOTE — PROGRESS NOTE ADULT - PROBLEM SELECTOR PROBLEM 10
Hypokalemia

## 2017-08-05 NOTE — PROGRESS NOTE ADULT - PROBLEM SELECTOR PROBLEM 2
Renal mass, right
Diarrhea of presumed infectious origin
Hematuria
Hypothyroidism, unspecified type
Leucocytosis
Renal mass, right
Frailty

## 2017-08-05 NOTE — PROGRESS NOTE ADULT - ATTENDING COMMENTS
d/c home with home hospice today with oral abx for UTI. I have an extensive discussion with Vera, her daughter at bedside. All questions answered.   Time 40 min

## 2017-08-05 NOTE — PROGRESS NOTE ADULT - PROBLEM SELECTOR PROBLEM 4
Frailty
Diarrhea of presumed infectious origin
Hypothyroidism, unspecified type
Late onset Alzheimer's disease with behavioral disturbance
Type 2 diabetes mellitus without complication, without long-term current use of insulin
Severe protein-calorie malnutrition

## 2017-08-05 NOTE — PROGRESS NOTE ADULT - PROBLEM SELECTOR PROBLEM 8
Deep vein thrombosis (DVT) of lower extremity, unspecified chronicity, unspecified laterality, unspecified vein
Renal mass, right
Thrombocytosis
Thrombocytosis

## 2017-08-05 NOTE — PROGRESS NOTE ADULT - PROBLEM SELECTOR PLAN 4
- continue Depakote and Zoloft  -pt on PRN Seroquel  for agitation  -functional quadriplegia : supportive care/assisstence with ADLs  -skin care as per protocol
- continue synthroid
- hold metformin and glybuide  - humalog SS  -FSBS stable
resolved. s/p flagyl x 10 d
- hold metformin and glybuide  - humalog SS  -FSBS stable
2/2 to progression on dementia.  Supportive care.
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

## 2017-08-05 NOTE — PROGRESS NOTE ADULT - PROBLEM SELECTOR PROBLEM 9
Thrombocytosis

## 2017-08-05 NOTE — PROGRESS NOTE ADULT - PROBLEM SELECTOR PLAN 3
- continue synthroid
- hold metformin and glybuide  - humalog SS
Abx broadened
Abx broadened  Monitor CBC
likely due to underlying malignancy
likely due to underlying malignancy
likely due to underlying malignancy, count stable
likely due to underlying malignancy, count stable
likely due to underlying malignancy, no further w/u at this time
likely due to underlying malignancy. count stable
likely procedure related- resolved now  s/p nephroureteral stent exchange and NT capped on 7/17 by IR  good urine output
multifactorial, malignancy and infection/UTI  continue Zosyn   Monitor CBC
resolved. s/p flagyl x 10 d
No plan for further intervention.
Denies any current abdominal pain.  On tylenol PRN order

## 2017-08-05 NOTE — PROGRESS NOTE ADULT - PROBLEM SELECTOR PLAN 8
DVT chronic   holding eliquis at this time  repeated RLE Doppler - negative  no indication to resume A/C at this time
Family does not want aggressive measures. Family doesn't want bx or any surgical intervention  overall prognosis is poor
Family does not want any surgical intervention  overall prognosis is poor
Family does not want any surgical intervention  overall prognosis is poor
Family does not want any surgical intervention, Palliative care eval for GOC discussion noted, Dtrs to d/w other family members
likely reactive
likely reactive

## 2017-08-05 NOTE — PROGRESS NOTE ADULT - PROBLEM SELECTOR PLAN 9
likely reactive

## 2017-08-05 NOTE — PROGRESS NOTE ADULT - PROBLEM SELECTOR PROBLEM 3
Abdominal pain
Leucocytosis
Diarrhea of presumed infectious origin
Hematuria
Hypothyroidism, unspecified type
Leucocytosis
Type 2 diabetes mellitus without complication, without long-term current use of insulin
Renal mass, right

## 2017-08-06 ENCOUNTER — INPATIENT (INPATIENT)
Facility: HOSPITAL | Age: 82
LOS: 3 days | End: 2017-08-10
Attending: INTERNAL MEDICINE | Admitting: INTERNAL MEDICINE
Payer: OTHER MISCELLANEOUS

## 2017-08-06 VITALS
HEART RATE: 74 BPM | TEMPERATURE: 98 F | SYSTOLIC BLOOD PRESSURE: 124 MMHG | OXYGEN SATURATION: 86 % | RESPIRATION RATE: 22 BRPM | HEIGHT: 63 IN | WEIGHT: 134.92 LBS | DIASTOLIC BLOOD PRESSURE: 69 MMHG

## 2017-08-06 DIAGNOSIS — Z90.49 ACQUIRED ABSENCE OF OTHER SPECIFIED PARTS OF DIGESTIVE TRACT: Chronic | ICD-10-CM

## 2017-08-06 DIAGNOSIS — K80.20 CALCULUS OF GALLBLADDER WITHOUT CHOLECYSTITIS WITHOUT OBSTRUCTION: Chronic | ICD-10-CM

## 2017-08-06 DIAGNOSIS — Z87.442 PERSONAL HISTORY OF URINARY CALCULI: Chronic | ICD-10-CM

## 2017-08-06 LAB
ALBUMIN SERPL ELPH-MCNC: 1.6 G/DL — LOW (ref 3.3–5)
ALP SERPL-CCNC: 96 U/L — SIGNIFICANT CHANGE UP (ref 40–120)
ALT FLD-CCNC: 8 U/L — LOW (ref 12–78)
ANION GAP SERPL CALC-SCNC: 6 MMOL/L — SIGNIFICANT CHANGE UP (ref 5–17)
ANISOCYTOSIS BLD QL: SLIGHT — SIGNIFICANT CHANGE UP
APPEARANCE UR: ABNORMAL
AST SERPL-CCNC: 14 U/L — LOW (ref 15–37)
BACTERIA # UR AUTO: ABNORMAL
BASE EXCESS BLDA CALC-SCNC: 19.4 MMOL/L — HIGH (ref -2–2)
BILIRUB SERPL-MCNC: 0.4 MG/DL — SIGNIFICANT CHANGE UP (ref 0.2–1.2)
BILIRUB UR-MCNC: NEGATIVE — SIGNIFICANT CHANGE UP
BLOOD GAS COMMENTS: SIGNIFICANT CHANGE UP
BLOOD GAS SOURCE: SIGNIFICANT CHANGE UP
BUN SERPL-MCNC: 22 MG/DL — SIGNIFICANT CHANGE UP (ref 7–23)
CALCIUM SERPL-MCNC: 10 MG/DL — SIGNIFICANT CHANGE UP (ref 8.5–10.1)
CHLORIDE SERPL-SCNC: 107 MMOL/L — SIGNIFICANT CHANGE UP (ref 96–108)
CK MB CFR SERPL CALC: <0.5 NG/ML — SIGNIFICANT CHANGE UP (ref 0.5–3.6)
CO2 SERPL-SCNC: 42 MMOL/L — HIGH (ref 22–31)
COLOR SPEC: YELLOW — SIGNIFICANT CHANGE UP
CREAT SERPL-MCNC: 0.55 MG/DL — SIGNIFICANT CHANGE UP (ref 0.5–1.3)
DIFF PNL FLD: ABNORMAL
EPI CELLS # UR: SIGNIFICANT CHANGE UP
GLUCOSE SERPL-MCNC: 245 MG/DL — HIGH (ref 70–99)
GLUCOSE UR QL: NEGATIVE MG/DL — SIGNIFICANT CHANGE UP
HCO3 BLDA-SCNC: 46 MMOL/L — HIGH (ref 21–29)
HCT VFR BLD CALC: 33.5 % — LOW (ref 34.5–45)
HGB BLD-MCNC: 9.8 G/DL — LOW (ref 11.5–15.5)
HOROWITZ INDEX BLDA+IHG-RTO: 100 — SIGNIFICANT CHANGE UP
HYPOCHROMIA BLD QL: SLIGHT — SIGNIFICANT CHANGE UP
KETONES UR-MCNC: ABNORMAL
LACTATE SERPL-SCNC: 4 MMOL/L — CRITICAL HIGH (ref 0.7–2)
LEUKOCYTE ESTERASE UR-ACNC: ABNORMAL
LG PLATELETS BLD QL AUTO: SLIGHT — SIGNIFICANT CHANGE UP
LIDOCAIN IGE QN: 31 U/L — LOW (ref 73–393)
LYMPHOCYTES # BLD AUTO: 3 % — LOW (ref 13–44)
MACROCYTES BLD QL: SLIGHT — SIGNIFICANT CHANGE UP
MCHC RBC-ENTMCNC: 27.7 PG — SIGNIFICANT CHANGE UP (ref 27–34)
MCHC RBC-ENTMCNC: 29.1 GM/DL — LOW (ref 32–36)
MCV RBC AUTO: 95.3 FL — SIGNIFICANT CHANGE UP (ref 80–100)
MONOCYTES NFR BLD AUTO: 3 % — SIGNIFICANT CHANGE UP (ref 2–14)
NEUTROPHILS NFR BLD AUTO: 88 % — HIGH (ref 43–77)
NEUTS BAND # BLD: 6 % — SIGNIFICANT CHANGE UP (ref 0–8)
NITRITE UR-MCNC: NEGATIVE — SIGNIFICANT CHANGE UP
NT-PROBNP SERPL-SCNC: 3768 PG/ML — HIGH (ref 0–450)
PCO2 BLDA: 62 MMHG — HIGH (ref 32–46)
PH BLD: 7.48 — HIGH (ref 7.35–7.45)
PH UR: 6 — SIGNIFICANT CHANGE UP (ref 5–8)
PLAT MORPH BLD: NORMAL — SIGNIFICANT CHANGE UP
PLATELET # BLD AUTO: 425 K/UL — HIGH (ref 150–400)
PO2 BLDA: 75 MMHG — SIGNIFICANT CHANGE UP (ref 74–108)
POLYCHROMASIA BLD QL SMEAR: SLIGHT — SIGNIFICANT CHANGE UP
POTASSIUM SERPL-MCNC: 2.1 MMOL/L — CRITICAL LOW (ref 3.5–5.3)
POTASSIUM SERPL-SCNC: 2.1 MMOL/L — CRITICAL LOW (ref 3.5–5.3)
PROT SERPL-MCNC: 6.5 GM/DL — SIGNIFICANT CHANGE UP (ref 6–8.3)
PROT UR-MCNC: 100 MG/DL
RBC # BLD: 3.52 M/UL — LOW (ref 3.8–5.2)
RBC # FLD: 18.6 % — HIGH (ref 11–15)
RBC BLD AUTO: ABNORMAL
RBC CASTS # UR COMP ASSIST: ABNORMAL /HPF (ref 0–4)
SAO2 % BLDA: 96 % — SIGNIFICANT CHANGE UP (ref 92–96)
SODIUM SERPL-SCNC: 155 MMOL/L — HIGH (ref 135–145)
SP GR SPEC: 1.02 — SIGNIFICANT CHANGE UP (ref 1.01–1.02)
TARGETS BLD QL SMEAR: SIGNIFICANT CHANGE UP
TOXIC GRANULES BLD QL SMEAR: PRESENT — SIGNIFICANT CHANGE UP
TROPONIN I SERPL-MCNC: 0.24 NG/ML — HIGH (ref 0.01–0.04)
UROBILINOGEN FLD QL: NEGATIVE MG/DL — SIGNIFICANT CHANGE UP
WBC # BLD: 36.1 K/UL — HIGH (ref 3.8–10.5)
WBC # FLD AUTO: 36.1 K/UL — HIGH (ref 3.8–10.5)
WBC UR QL: >50

## 2017-08-06 PROCEDURE — 99285 EMERGENCY DEPT VISIT HI MDM: CPT

## 2017-08-06 PROCEDURE — 71010: CPT | Mod: 26

## 2017-08-06 RX ORDER — INSULIN LISPRO 100/ML
VIAL (ML) SUBCUTANEOUS AT BEDTIME
Qty: 0 | Refills: 0 | Status: DISCONTINUED | OUTPATIENT
Start: 2017-08-06 | End: 2017-08-10

## 2017-08-06 RX ORDER — PIPERACILLIN AND TAZOBACTAM 4; .5 G/20ML; G/20ML
3.38 INJECTION, POWDER, LYOPHILIZED, FOR SOLUTION INTRAVENOUS EVERY 8 HOURS
Qty: 0 | Refills: 0 | Status: DISCONTINUED | OUTPATIENT
Start: 2017-08-06 | End: 2017-08-10

## 2017-08-06 RX ORDER — INSULIN LISPRO 100/ML
VIAL (ML) SUBCUTANEOUS AT BEDTIME
Qty: 0 | Refills: 0 | Status: DISCONTINUED | OUTPATIENT
Start: 2017-08-06 | End: 2017-08-06

## 2017-08-06 RX ORDER — INSULIN LISPRO 100/ML
VIAL (ML) SUBCUTANEOUS
Qty: 0 | Refills: 0 | Status: DISCONTINUED | OUTPATIENT
Start: 2017-08-06 | End: 2017-08-10

## 2017-08-06 RX ORDER — FENTANYL CITRATE 50 UG/ML
1 INJECTION INTRAVENOUS
Qty: 0 | Refills: 0 | Status: DISCONTINUED | OUTPATIENT
Start: 2017-08-06 | End: 2017-08-10

## 2017-08-06 RX ORDER — ACETAMINOPHEN 500 MG
650 TABLET ORAL ONCE
Qty: 0 | Refills: 0 | Status: COMPLETED | OUTPATIENT
Start: 2017-08-06 | End: 2017-08-06

## 2017-08-06 RX ORDER — SODIUM CHLORIDE 9 MG/ML
2000 INJECTION INTRAMUSCULAR; INTRAVENOUS; SUBCUTANEOUS ONCE
Qty: 0 | Refills: 0 | Status: COMPLETED | OUTPATIENT
Start: 2017-08-06 | End: 2017-08-06

## 2017-08-06 RX ORDER — PIPERACILLIN AND TAZOBACTAM 4; .5 G/20ML; G/20ML
3.38 INJECTION, POWDER, LYOPHILIZED, FOR SOLUTION INTRAVENOUS ONCE
Qty: 0 | Refills: 0 | Status: COMPLETED | OUTPATIENT
Start: 2017-08-06 | End: 2017-08-06

## 2017-08-06 RX ORDER — SENNA PLUS 8.6 MG/1
2 TABLET ORAL AT BEDTIME
Qty: 0 | Refills: 0 | Status: DISCONTINUED | OUTPATIENT
Start: 2017-08-06 | End: 2017-08-07

## 2017-08-06 RX ORDER — VALPROIC ACID (AS SODIUM SALT) 250 MG/5ML
250 SOLUTION, ORAL ORAL EVERY 8 HOURS
Qty: 0 | Refills: 0 | Status: DISCONTINUED | OUTPATIENT
Start: 2017-08-06 | End: 2017-08-07

## 2017-08-06 RX ORDER — VANCOMYCIN HCL 1 G
1000 VIAL (EA) INTRAVENOUS ONCE
Qty: 0 | Refills: 0 | Status: COMPLETED | OUTPATIENT
Start: 2017-08-06 | End: 2017-08-06

## 2017-08-06 RX ORDER — INSULIN LISPRO 100/ML
VIAL (ML) SUBCUTANEOUS
Qty: 0 | Refills: 0 | Status: DISCONTINUED | OUTPATIENT
Start: 2017-08-06 | End: 2017-08-06

## 2017-08-06 RX ORDER — LEVOTHYROXINE SODIUM 125 MCG
75 TABLET ORAL DAILY
Qty: 0 | Refills: 0 | Status: DISCONTINUED | OUTPATIENT
Start: 2017-08-06 | End: 2017-08-07

## 2017-08-06 RX ADMIN — Medication 1: at 23:35

## 2017-08-06 RX ADMIN — SODIUM CHLORIDE 1000 MILLILITER(S): 9 INJECTION INTRAMUSCULAR; INTRAVENOUS; SUBCUTANEOUS at 18:15

## 2017-08-06 RX ADMIN — PIPERACILLIN AND TAZOBACTAM 200 GRAM(S): 4; .5 INJECTION, POWDER, LYOPHILIZED, FOR SOLUTION INTRAVENOUS at 18:29

## 2017-08-06 RX ADMIN — Medication 650 MILLIGRAM(S): at 19:00

## 2017-08-06 RX ADMIN — Medication 250 MILLIGRAM(S): at 21:34

## 2017-08-06 NOTE — ED PROVIDER NOTE - OBJECTIVE STATEMENT
Pt is a 80 yo lady with a pmhx of DM, dementia, recent discharge yesterday for UTI. Is on Hospice network, and uses home O2. Presents to ED because of increased respiratory distress and paleness of extremities. She is nonverbal and unresponsive at baseline. Is on cefuroxime. DNR.

## 2017-08-06 NOTE — ED PROVIDER NOTE - MEDICAL DECISION MAKING DETAILS
Ddx: Sepsis/ worsening underlying disease  Plan: Family does not want invasive measures. Will accept labs, fluids, IV, and Bipap.

## 2017-08-07 DIAGNOSIS — A41.9 SEPSIS, UNSPECIFIED ORGANISM: ICD-10-CM

## 2017-08-07 DIAGNOSIS — I10 ESSENTIAL (PRIMARY) HYPERTENSION: ICD-10-CM

## 2017-08-07 DIAGNOSIS — E03.9 HYPOTHYROIDISM, UNSPECIFIED: ICD-10-CM

## 2017-08-07 DIAGNOSIS — N28.89 OTHER SPECIFIED DISORDERS OF KIDNEY AND URETER: ICD-10-CM

## 2017-08-07 DIAGNOSIS — G30.8 OTHER ALZHEIMER'S DISEASE: ICD-10-CM

## 2017-08-07 DIAGNOSIS — E11.9 TYPE 2 DIABETES MELLITUS WITHOUT COMPLICATIONS: ICD-10-CM

## 2017-08-07 LAB
ANION GAP SERPL CALC-SCNC: 6 MMOL/L — SIGNIFICANT CHANGE UP (ref 5–17)
BUN SERPL-MCNC: 22 MG/DL — SIGNIFICANT CHANGE UP (ref 7–23)
CALCIUM SERPL-MCNC: 9.9 MG/DL — SIGNIFICANT CHANGE UP (ref 8.5–10.1)
CHLORIDE SERPL-SCNC: 108 MMOL/L — SIGNIFICANT CHANGE UP (ref 96–108)
CO2 SERPL-SCNC: 42 MMOL/L — HIGH (ref 22–31)
CREAT SERPL-MCNC: 0.68 MG/DL — SIGNIFICANT CHANGE UP (ref 0.5–1.3)
GLUCOSE SERPL-MCNC: 189 MG/DL — HIGH (ref 70–99)
HCT VFR BLD CALC: 32.5 % — LOW (ref 34.5–45)
HGB BLD-MCNC: 9.7 G/DL — LOW (ref 11.5–15.5)
MCHC RBC-ENTMCNC: 27.9 PG — SIGNIFICANT CHANGE UP (ref 27–34)
MCHC RBC-ENTMCNC: 29.7 GM/DL — LOW (ref 32–36)
MCV RBC AUTO: 93.9 FL — SIGNIFICANT CHANGE UP (ref 80–100)
PLATELET # BLD AUTO: 292 K/UL — SIGNIFICANT CHANGE UP (ref 150–400)
POTASSIUM SERPL-MCNC: 2.3 MMOL/L — CRITICAL LOW (ref 3.5–5.3)
POTASSIUM SERPL-SCNC: 2.3 MMOL/L — CRITICAL LOW (ref 3.5–5.3)
RBC # BLD: 3.46 M/UL — LOW (ref 3.8–5.2)
RBC # FLD: 18.8 % — HIGH (ref 11–15)
SODIUM SERPL-SCNC: 156 MMOL/L — HIGH (ref 135–145)
WBC # BLD: 30.6 K/UL — HIGH (ref 3.8–10.5)
WBC # FLD AUTO: 30.6 K/UL — HIGH (ref 3.8–10.5)

## 2017-08-07 RX ORDER — LEVOTHYROXINE SODIUM 125 MCG
37.5 TABLET ORAL DAILY
Qty: 0 | Refills: 0 | Status: DISCONTINUED | OUTPATIENT
Start: 2017-08-07 | End: 2017-08-10

## 2017-08-07 RX ORDER — POTASSIUM CHLORIDE 20 MEQ
10 PACKET (EA) ORAL
Qty: 0 | Refills: 0 | Status: COMPLETED | OUTPATIENT
Start: 2017-08-07 | End: 2017-08-07

## 2017-08-07 RX ORDER — MORPHINE SULFATE 50 MG/1
2 CAPSULE, EXTENDED RELEASE ORAL
Qty: 0 | Refills: 0 | Status: DISCONTINUED | OUTPATIENT
Start: 2017-08-07 | End: 2017-08-10

## 2017-08-07 RX ORDER — DEXTROSE MONOHYDRATE, SODIUM CHLORIDE, AND POTASSIUM CHLORIDE 50; .745; 4.5 G/1000ML; G/1000ML; G/1000ML
1000 INJECTION, SOLUTION INTRAVENOUS
Qty: 0 | Refills: 0 | Status: COMPLETED | OUTPATIENT
Start: 2017-08-07 | End: 2017-08-07

## 2017-08-07 RX ORDER — SODIUM CHLORIDE 9 MG/ML
1000 INJECTION, SOLUTION INTRAVENOUS
Qty: 0 | Refills: 0 | Status: DISCONTINUED | OUTPATIENT
Start: 2017-08-07 | End: 2017-08-07

## 2017-08-07 RX ORDER — VALPROIC ACID (AS SODIUM SALT) 250 MG/5ML
250 SOLUTION, ORAL ORAL EVERY 8 HOURS
Qty: 0 | Refills: 0 | Status: DISCONTINUED | OUTPATIENT
Start: 2017-08-07 | End: 2017-08-10

## 2017-08-07 RX ORDER — ACETAMINOPHEN 500 MG
650 TABLET ORAL EVERY 6 HOURS
Qty: 0 | Refills: 0 | Status: DISCONTINUED | OUTPATIENT
Start: 2017-08-07 | End: 2017-08-10

## 2017-08-07 RX ORDER — MORPHINE SULFATE 50 MG/1
1 CAPSULE, EXTENDED RELEASE ORAL
Qty: 0 | Refills: 0 | Status: DISCONTINUED | OUTPATIENT
Start: 2017-08-07 | End: 2017-08-07

## 2017-08-07 RX ORDER — DEXTROSE MONOHYDRATE, SODIUM CHLORIDE, AND POTASSIUM CHLORIDE 50; .745; 4.5 G/1000ML; G/1000ML; G/1000ML
1000 INJECTION, SOLUTION INTRAVENOUS
Qty: 0 | Refills: 0 | Status: DISCONTINUED | OUTPATIENT
Start: 2017-08-07 | End: 2017-08-07

## 2017-08-07 RX ADMIN — Medication 100 MILLIEQUIVALENT(S): at 14:22

## 2017-08-07 RX ADMIN — PIPERACILLIN AND TAZOBACTAM 25 GRAM(S): 4; .5 INJECTION, POWDER, LYOPHILIZED, FOR SOLUTION INTRAVENOUS at 21:42

## 2017-08-07 RX ADMIN — Medication 26.25 MILLIGRAM(S): at 17:20

## 2017-08-07 RX ADMIN — Medication 1: at 11:00

## 2017-08-07 RX ADMIN — DEXTROSE MONOHYDRATE, SODIUM CHLORIDE, AND POTASSIUM CHLORIDE 30 MILLILITER(S): 50; .745; 4.5 INJECTION, SOLUTION INTRAVENOUS at 00:44

## 2017-08-07 RX ADMIN — PIPERACILLIN AND TAZOBACTAM 25 GRAM(S): 4; .5 INJECTION, POWDER, LYOPHILIZED, FOR SOLUTION INTRAVENOUS at 14:26

## 2017-08-07 RX ADMIN — MORPHINE SULFATE 1 MILLIGRAM(S): 50 CAPSULE, EXTENDED RELEASE ORAL at 08:12

## 2017-08-07 RX ADMIN — Medication 100 MILLIEQUIVALENT(S): at 16:30

## 2017-08-07 RX ADMIN — Medication 650 MILLIGRAM(S): at 10:51

## 2017-08-07 RX ADMIN — Medication 100 MILLIEQUIVALENT(S): at 15:12

## 2017-08-07 RX ADMIN — Medication 26.25 MILLIGRAM(S): at 21:42

## 2017-08-07 RX ADMIN — Medication 2: at 08:11

## 2017-08-07 RX ADMIN — Medication 650 MILLIGRAM(S): at 19:58

## 2017-08-07 RX ADMIN — MORPHINE SULFATE 1 MILLIGRAM(S): 50 CAPSULE, EXTENDED RELEASE ORAL at 09:48

## 2017-08-07 RX ADMIN — DEXTROSE MONOHYDRATE, SODIUM CHLORIDE, AND POTASSIUM CHLORIDE 50 MILLILITER(S): 50; .745; 4.5 INJECTION, SOLUTION INTRAVENOUS at 15:12

## 2017-08-07 RX ADMIN — PIPERACILLIN AND TAZOBACTAM 25 GRAM(S): 4; .5 INJECTION, POWDER, LYOPHILIZED, FOR SOLUTION INTRAVENOUS at 06:53

## 2017-08-07 NOTE — H&P ADULT - HISTORY OF PRESENT ILLNESS
81 year old female with history of dementia recently discharged from [facility] where she was treated for urinary sepsis and pneumonia was at home and developed fever again.  Patient brought to ED where she was found to have fever and hypoxia and admission was advised.

## 2017-08-08 ENCOUNTER — APPOINTMENT (OUTPATIENT)
Dept: UROLOGY | Facility: CLINIC | Age: 82
End: 2017-08-08

## 2017-08-08 DIAGNOSIS — R06.00 DYSPNEA, UNSPECIFIED: ICD-10-CM

## 2017-08-08 LAB
-  AMIKACIN: SIGNIFICANT CHANGE UP
-  AZTREONAM: SIGNIFICANT CHANGE UP
-  CEFEPIME: SIGNIFICANT CHANGE UP
-  CEFTAZIDIME: SIGNIFICANT CHANGE UP
-  CIPROFLOXACIN: SIGNIFICANT CHANGE UP
-  GENTAMICIN: SIGNIFICANT CHANGE UP
-  IMIPENEM: SIGNIFICANT CHANGE UP
-  LEVOFLOXACIN: SIGNIFICANT CHANGE UP
-  MEROPENEM: SIGNIFICANT CHANGE UP
-  PIPERACILLIN/TAZOBACTAM: SIGNIFICANT CHANGE UP
-  TOBRAMYCIN: SIGNIFICANT CHANGE UP
BACTERIA BLD CULT: SIGNIFICANT CHANGE UP
BACTERIA BLD CULT: SIGNIFICANT CHANGE UP
CULTURE RESULTS: SIGNIFICANT CHANGE UP
METHOD TYPE: SIGNIFICANT CHANGE UP
ORGANISM # SPEC MICROSCOPIC CNT: SIGNIFICANT CHANGE UP
ORGANISM # SPEC MICROSCOPIC CNT: SIGNIFICANT CHANGE UP
SPECIMEN SOURCE: SIGNIFICANT CHANGE UP

## 2017-08-08 RX ORDER — SODIUM CHLORIDE 9 MG/ML
1000 INJECTION, SOLUTION INTRAVENOUS
Qty: 0 | Refills: 0 | Status: DISCONTINUED | OUTPATIENT
Start: 2017-08-08 | End: 2017-08-10

## 2017-08-08 RX ORDER — IPRATROPIUM/ALBUTEROL SULFATE 18-103MCG
3 AEROSOL WITH ADAPTER (GRAM) INHALATION EVERY 6 HOURS
Qty: 0 | Refills: 0 | Status: DISCONTINUED | OUTPATIENT
Start: 2017-08-08 | End: 2017-08-10

## 2017-08-08 RX ORDER — MORPHINE SULFATE 50 MG/1
2 CAPSULE, EXTENDED RELEASE ORAL ONCE
Qty: 0 | Refills: 0 | Status: DISCONTINUED | OUTPATIENT
Start: 2017-08-08 | End: 2017-08-08

## 2017-08-08 RX ORDER — ROBINUL 0.2 MG/ML
0.2 INJECTION INTRAMUSCULAR; INTRAVENOUS EVERY 6 HOURS
Qty: 0 | Refills: 0 | Status: DISCONTINUED | OUTPATIENT
Start: 2017-08-08 | End: 2017-08-10

## 2017-08-08 RX ADMIN — MORPHINE SULFATE 2 MILLIGRAM(S): 50 CAPSULE, EXTENDED RELEASE ORAL at 20:43

## 2017-08-08 RX ADMIN — MORPHINE SULFATE 2 MILLIGRAM(S): 50 CAPSULE, EXTENDED RELEASE ORAL at 15:50

## 2017-08-08 RX ADMIN — MORPHINE SULFATE 2 MILLIGRAM(S): 50 CAPSULE, EXTENDED RELEASE ORAL at 20:39

## 2017-08-08 RX ADMIN — Medication 37.5 MICROGRAM(S): at 05:26

## 2017-08-08 RX ADMIN — Medication 1: at 16:26

## 2017-08-08 RX ADMIN — MORPHINE SULFATE 2 MILLIGRAM(S): 50 CAPSULE, EXTENDED RELEASE ORAL at 09:29

## 2017-08-08 RX ADMIN — Medication 26.25 MILLIGRAM(S): at 05:27

## 2017-08-08 RX ADMIN — MORPHINE SULFATE 2 MILLIGRAM(S): 50 CAPSULE, EXTENDED RELEASE ORAL at 10:00

## 2017-08-08 RX ADMIN — FENTANYL CITRATE 1 PATCH: 50 INJECTION INTRAVENOUS at 12:09

## 2017-08-08 RX ADMIN — Medication 3 MILLILITER(S): at 21:30

## 2017-08-08 RX ADMIN — Medication 26.25 MILLIGRAM(S): at 21:56

## 2017-08-08 RX ADMIN — PIPERACILLIN AND TAZOBACTAM 25 GRAM(S): 4; .5 INJECTION, POWDER, LYOPHILIZED, FOR SOLUTION INTRAVENOUS at 21:56

## 2017-08-08 RX ADMIN — MORPHINE SULFATE 2 MILLIGRAM(S): 50 CAPSULE, EXTENDED RELEASE ORAL at 20:06

## 2017-08-08 RX ADMIN — Medication 26.25 MILLIGRAM(S): at 13:02

## 2017-08-08 RX ADMIN — PIPERACILLIN AND TAZOBACTAM 25 GRAM(S): 4; .5 INJECTION, POWDER, LYOPHILIZED, FOR SOLUTION INTRAVENOUS at 13:59

## 2017-08-08 RX ADMIN — MORPHINE SULFATE 2 MILLIGRAM(S): 50 CAPSULE, EXTENDED RELEASE ORAL at 21:00

## 2017-08-08 RX ADMIN — Medication 1: at 12:09

## 2017-08-08 RX ADMIN — Medication 650 MILLIGRAM(S): at 09:26

## 2017-08-08 RX ADMIN — MORPHINE SULFATE 2 MILLIGRAM(S): 50 CAPSULE, EXTENDED RELEASE ORAL at 15:57

## 2017-08-08 RX ADMIN — Medication 2: at 08:23

## 2017-08-08 RX ADMIN — PIPERACILLIN AND TAZOBACTAM 25 GRAM(S): 4; .5 INJECTION, POWDER, LYOPHILIZED, FOR SOLUTION INTRAVENOUS at 05:26

## 2017-08-08 NOTE — PROGRESS NOTE ADULT - SUBJECTIVE AND OBJECTIVE BOX
JUNE ROMANO                    81y  Female    Allergies    adhesives (Rash)  No Known Drug Allergies    Intolerances        Symptoms:  Pain (1-10): moaning and grimacing with repositioning  Dyspnea:  yes-on Bipap  Nausea/Vomiting:  no  Secretions:   Agitation:  Symptom Requiring Inpatient Hospice Admission:    Overnight events/interim history: Pt without any clinical improvement in clinical or functional status on Bipap, IV abx, IVF.  Morphine IV x 2 doses in 24 hr  NPO            PPSV2: 10    Code Status:  DNR          MEDICATIONS  (STANDING):  fentaNYL   Patch  12 MICROgram(s)/Hr 1 Patch Transdermal every 72 hours  piperacillin/tazobactam IVPB. 3.375 Gram(s) IV Intermittent every 8 hours  insulin lispro (HumaLOG) corrective regimen sliding scale   SubCutaneous three times a day before meals  insulin lispro (HumaLOG) corrective regimen sliding scale   SubCutaneous at bedtime  levothyroxine Injectable 37.5 MICROGram(s) IV Push daily  valproate sodium IVPB 250 milliGRAM(s) IV Intermittent every 8 hours    MEDICATIONS  (PRN):  bisacodyl Suppository 10 milliGRAM(s) Rectal daily PRN Constipation  sodium biphosphate Rectal Enema 1 Enema Rectal daily PRN constipation  acetaminophen  Suppository 650 milliGRAM(s) Rectal every 6 hours PRN For Temp greater than 38 C (100.4 F)  morphine  - Injectable 2 milliGRAM(s) IV Push every 3 hours PRN pain      CBC Full  -  ( 07 Aug 2017 11:27 )  WBC Count : 30.6 K/uL  Hemoglobin : 9.7 g/dL  Hematocrit : 32.5 %  Platelet Count - Automated : 292 K/uL  Mean Cell Volume : 93.9 fl  Mean Cell Hemoglobin : 27.9 pg  Mean Cell Hemoglobin Concentration : 29.7 gm/dL  Auto Neutrophil # : x  Auto Lymphocyte # : x  Auto Monocyte # : x  Auto Eosinophil # : x  Auto Basophil # : x  Auto Neutrophil % : x  Auto Lymphocyte % : x  Auto Monocyte % : x  Auto Eosinophil % : x  Auto Basophil % : x                     LIVER FUNCTIONS - ( 06 Aug 2017 18:26 )  Alb: 1.6 g/dL / Pro: 6.5 gm/dL / ALK PHOS: 96 U/L / ALT: 8 U/L / AST: 14 U/L / GGT: x             Vital Signs Last 24 Hrs  T(C): 37.4 (08 Aug 2017 12:00), Max: 38.3 (07 Aug 2017 19:25)  T(F): 99.4 (08 Aug 2017 12:00), Max: 100.9 (07 Aug 2017 19:25)  HR: 78 (08 Aug 2017 11:48) (78 - 97)  BP: 110/62 (08 Aug 2017 08:05) (110/62 - 110/62)  BP(mean): --  RR: --  SpO2: 98% (08 Aug 2017 11:48) (93% - 98%)    PHYSICAL EXAM:      Constitutional:    Eyes:    ENMT:    Neck:    Breasts:    Back:    Respiratory:    Cardiovascular:    Gastrointestinal:    Genitourinary:    Rectal:    Extremities:    Vascular:    Neurological:    Skin:    Lymph Nodes:    Musculoskeletal:    Psychiatric: JUNE ROMANO                    81y  Female    Allergies    adhesives (Rash)  No Known Drug Allergies    Intolerances        Symptoms:  Pain (1-10): moaning and grimacing with repositioning  Dyspnea:  yes-on Bipap  Nausea/Vomiting:  no  Secretions:   Agitation:  Symptom Requiring Inpatient Hospice Admission:    Overnight events/interim history: Pt without any clinical improvement in clinical or functional status on Bipap, IV abx, IVF.  Morphine IV x 2 doses in 24 hr  NPO            PPSV2: 10    Code Status:  DNR          MEDICATIONS  (STANDING):  fentaNYL   Patch  12 MICROgram(s)/Hr 1 Patch Transdermal every 72 hours  piperacillin/tazobactam IVPB. 3.375 Gram(s) IV Intermittent every 8 hours  insulin lispro (HumaLOG) corrective regimen sliding scale   SubCutaneous three times a day before meals  insulin lispro (HumaLOG) corrective regimen sliding scale   SubCutaneous at bedtime  levothyroxine Injectable 37.5 MICROGram(s) IV Push daily  valproate sodium IVPB 250 milliGRAM(s) IV Intermittent every 8 hours    MEDICATIONS  (PRN):  bisacodyl Suppository 10 milliGRAM(s) Rectal daily PRN Constipation  sodium biphosphate Rectal Enema 1 Enema Rectal daily PRN constipation  acetaminophen  Suppository 650 milliGRAM(s) Rectal every 6 hours PRN For Temp greater than 38 C (100.4 F)  morphine  - Injectable 2 milliGRAM(s) IV Push every 3 hours PRN pain      CBC Full  -  ( 07 Aug 2017 11:27 )  WBC Count : 30.6 K/uL  Hemoglobin : 9.7 g/dL  Hematocrit : 32.5 %  Platelet Count - Automated : 292 K/uL  Mean Cell Volume : 93.9 fl  Mean Cell Hemoglobin : 27.9 pg  Mean Cell Hemoglobin Concentration : 29.7 gm/dL  Auto Neutrophil # : x  Auto Lymphocyte # : x  Auto Monocyte # : x  Auto Eosinophil # : x  Auto Basophil # : x  Auto Neutrophil % : x  Auto Lymphocyte % : x  Auto Monocyte % : x  Auto Eosinophil % : x  Auto Basophil % : x                     LIVER FUNCTIONS - ( 06 Aug 2017 18:26 )  Alb: 1.6 g/dL / Pro: 6.5 gm/dL / ALK PHOS: 96 U/L / ALT: 8 U/L / AST: 14 U/L / GGT: x             Vital Signs Last 24 Hrs  T(C): 37.4 (08 Aug 2017 12:00), Max: 38.3 (07 Aug 2017 19:25)  T(F): 99.4 (08 Aug 2017 12:00), Max: 100.9 (07 Aug 2017 19:25)  HR: 78 (08 Aug 2017 11:48) (78 - 97)  BP: 110/62 (08 Aug 2017 08:05) (110/62 - 110/62)  BP(mean): --  RR: --  SpO2: 98% (08 Aug 2017 11:48) (93% - 98%)    PHYSICAL EXAM:      Constitutional:  obtunded    Eyes:  anicteric    ENMT: cheeks and bridge of nose excoraited due to bipap mask    Neck: no JVD    Respiratory: mildly labored respirations    Cardiovascular:  Reg s1 s2    Gastrointestinal: abd soft and nontender     Genitourinary:  sanchez clear yellow urine, nephrostomy site benign    Rectal: deferred    Extremities: no mottling, some swelling bilat UE    Vascular: DP and radial pulses are = and strong    Neurological:  obtunded

## 2017-08-09 RX ADMIN — MORPHINE SULFATE 2 MILLIGRAM(S): 50 CAPSULE, EXTENDED RELEASE ORAL at 16:10

## 2017-08-09 RX ADMIN — PIPERACILLIN AND TAZOBACTAM 25 GRAM(S): 4; .5 INJECTION, POWDER, LYOPHILIZED, FOR SOLUTION INTRAVENOUS at 13:26

## 2017-08-09 RX ADMIN — Medication 0.5 MILLIGRAM(S): at 21:00

## 2017-08-09 RX ADMIN — MORPHINE SULFATE 2 MILLIGRAM(S): 50 CAPSULE, EXTENDED RELEASE ORAL at 02:20

## 2017-08-09 RX ADMIN — MORPHINE SULFATE 2 MILLIGRAM(S): 50 CAPSULE, EXTENDED RELEASE ORAL at 20:59

## 2017-08-09 RX ADMIN — Medication 650 MILLIGRAM(S): at 02:05

## 2017-08-09 RX ADMIN — Medication 26.25 MILLIGRAM(S): at 13:24

## 2017-08-09 RX ADMIN — Medication 650 MILLIGRAM(S): at 21:27

## 2017-08-09 RX ADMIN — MORPHINE SULFATE 2 MILLIGRAM(S): 50 CAPSULE, EXTENDED RELEASE ORAL at 13:21

## 2017-08-09 RX ADMIN — Medication 3 MILLILITER(S): at 13:34

## 2017-08-09 RX ADMIN — Medication 26.25 MILLIGRAM(S): at 05:39

## 2017-08-09 RX ADMIN — MORPHINE SULFATE 2 MILLIGRAM(S): 50 CAPSULE, EXTENDED RELEASE ORAL at 13:35

## 2017-08-09 RX ADMIN — Medication 26.25 MILLIGRAM(S): at 21:25

## 2017-08-09 RX ADMIN — Medication 1: at 07:59

## 2017-08-09 RX ADMIN — PIPERACILLIN AND TAZOBACTAM 25 GRAM(S): 4; .5 INJECTION, POWDER, LYOPHILIZED, FOR SOLUTION INTRAVENOUS at 21:24

## 2017-08-09 RX ADMIN — MORPHINE SULFATE 2 MILLIGRAM(S): 50 CAPSULE, EXTENDED RELEASE ORAL at 09:15

## 2017-08-09 RX ADMIN — MORPHINE SULFATE 2 MILLIGRAM(S): 50 CAPSULE, EXTENDED RELEASE ORAL at 20:16

## 2017-08-09 RX ADMIN — MORPHINE SULFATE 2 MILLIGRAM(S): 50 CAPSULE, EXTENDED RELEASE ORAL at 08:57

## 2017-08-09 RX ADMIN — MORPHINE SULFATE 2 MILLIGRAM(S): 50 CAPSULE, EXTENDED RELEASE ORAL at 02:04

## 2017-08-09 RX ADMIN — PIPERACILLIN AND TAZOBACTAM 25 GRAM(S): 4; .5 INJECTION, POWDER, LYOPHILIZED, FOR SOLUTION INTRAVENOUS at 05:41

## 2017-08-09 RX ADMIN — Medication 37.5 MICROGRAM(S): at 05:40

## 2017-08-09 RX ADMIN — MORPHINE SULFATE 2 MILLIGRAM(S): 50 CAPSULE, EXTENDED RELEASE ORAL at 15:55

## 2017-08-09 NOTE — DIETITIAN INITIAL EVALUATION ADULT. - OTHER INFO
Pt. was seen due to RN consult for chewing/swallowing. Pt is adm to Hospice care with dx of sepsis.  Pt adm c stage 1 pressure ulcer of sacrum.  Pt is NPO, on IVF @ 30 ml/hr.  Nutrition intervention is not indicated at present.  RD to remain available as indicated.

## 2017-08-09 NOTE — PROGRESS NOTE ADULT - SUBJECTIVE AND OBJECTIVE BOX
JUNE ROMANO                    81y  Female    Allergies    adhesives (Rash)  No Known Drug Allergies    Intolerances        Symptoms:  Pain (1-10): on fentanyl  Dyspnea: on oxygen  Nausea/Vomiting: none  Secretions:  no  Agitation: moans when moved    Symptom Requiring Inpatient Hospice Admission:  IV antibiotics    Overnight events/interim history:    HPI:  81 year old female with history of dementia recently discharged from [facility] where she was treated for urinary sepsis and pneumonia was at home and developed fever again.  Patient brought to ED where she was found to have fever and hypoxia and admission was advised. (07 Aug 2017 07:46)          PPSV2:     Code Status:  DNR          MEDICATIONS  (STANDING):  fentaNYL   Patch  12 MICROgram(s)/Hr 1 Patch Transdermal every 72 hours  piperacillin/tazobactam IVPB. 3.375 Gram(s) IV Intermittent every 8 hours  insulin lispro (HumaLOG) corrective regimen sliding scale   SubCutaneous three times a day before meals  insulin lispro (HumaLOG) corrective regimen sliding scale   SubCutaneous at bedtime  levothyroxine Injectable 37.5 MICROGram(s) IV Push daily  valproate sodium IVPB 250 milliGRAM(s) IV Intermittent every 8 hours  dextrose 5% + sodium chloride 0.45%. 1000 milliLiter(s) (30 mL/Hr) IV Continuous <Continuous>    MEDICATIONS  (PRN):  bisacodyl Suppository 10 milliGRAM(s) Rectal daily PRN Constipation  sodium biphosphate Rectal Enema 1 Enema Rectal daily PRN constipation  acetaminophen  Suppository 650 milliGRAM(s) Rectal every 6 hours PRN For Temp greater than 38 C (100.4 F)  morphine  - Injectable 2 milliGRAM(s) IV Push every 3 hours PRN pain  LORazepam   Injectable 0.5 milliGRAM(s) IV Push every 4 hours PRN restlessness and /or agitation  ALBUTerol/ipratropium for Nebulization 3 milliLiter(s) Nebulizer every 6 hours PRN Shortness of Breath and/or Wheezing  glycopyrrolate Injectable 0.2 milliGRAM(s) IV Push every 6 hours PRN secretions      CBC Full  -  ( 07 Aug 2017 11:27 )  WBC Count : 30.6 K/uL  Hemoglobin : 9.7 g/dL  Hematocrit : 32.5 %  Platelet Count - Automated : 292 K/uL  Mean Cell Volume : 93.9 fl  Mean Cell Hemoglobin : 27.9 pg  Mean Cell Hemoglobin Concentration : 29.7 gm/dL  Auto Neutrophil # : x  Auto Lymphocyte # : x  Auto Monocyte # : x  Auto Eosinophil # : x  Auto Basophil # : x  Auto Neutrophil % : x  Auto Lymphocyte % : x  Auto Monocyte % : x  Auto Eosinophil % : x  Auto Basophil % : x                         Vital Signs Last 24 Hrs  T(C): 36.5 (09 Aug 2017 07:38), Max: 38.1 (09 Aug 2017 06:21)  T(F): 97.7 (09 Aug 2017 07:38), Max: 100.6 (09 Aug 2017 06:21)  HR: 101 (09 Aug 2017 07:38) (78 - 113)  BP: 102/48 (09 Aug 2017 07:38) (100/54 - 102/48)  BP(mean): --  RR: 8 (09 Aug 2017 07:38) (8 - 20)  SpO2: 94% (09 Aug 2017 07:38) (85% - 100%)    PHYSICAL EXAM:      Constitutional: frail    Eyes: shut    ENMT: dry    Neck: supple    Breasts: not examined    Back: reddened    Respiratory:  coarsely transmitted breath sounds     Cardiovascular: regular    Gastrointestinal: abdomen soft non tender    Genitourinary: not examined    Rectal: not examined    Extremities: pedal edema    Vascular: no change    Neurological: moans when moved    Skin: dry    Lymph Nodes: none palpable    Musculoskeletal: no strength    Psychiatric:  NA

## 2017-08-10 VITALS — TEMPERATURE: 99 F

## 2017-08-10 RX ADMIN — PIPERACILLIN AND TAZOBACTAM 25 GRAM(S): 4; .5 INJECTION, POWDER, LYOPHILIZED, FOR SOLUTION INTRAVENOUS at 06:03

## 2017-08-10 RX ADMIN — Medication 26.25 MILLIGRAM(S): at 06:02

## 2017-08-10 RX ADMIN — Medication 37.5 MICROGRAM(S): at 06:02

## 2017-08-10 NOTE — DISCHARGE NOTE FOR THE EXPIRED PATIENT - HOSPITAL COURSE
81 yr old female with PMHx of  with advanced dementia, DVT, , hypothyroidism, R ureteral obstruction 2/2 mass s/p nephrostomy tube and prolonged hospitalization for diarrhea.  D/C home on home hospice and came back to [facility] 2 days later with dyspnea.   Pt without any clinical improvement in clinical or functional status on Bipap, IV abx, IVF.  Morphine IV x 2 doses in 24 hr  NPO. Continues to run low grade temps.    Family meeting with Valarie Tapia, and Vrea. Discussed poor prognosis and unlikely recovery from septic event with already severe debility secondary to dementia and renal mass.  Discussed GOC. Based upon known pt wishes they have decided to focus on symptom management and they wish to reduce suffering. They have decided to d/c bipap, continue IV abx, treat dyspnea and pain with IV opiates and anxiolytics PRN. Pt on 100% NRB-did trial of 55% Fio2 but sats were dropping.    CALLED BY RN TO EXAMINE PT. FAMILY AT BEDSIDE. NO HR, NO RR, NO B/P, NO PUPILLARY RESPONSE. PT PRONOUNCED TODAY AT 8AM. DR. SCHWAB TO BE NOTIFIED BY RN.

## 2017-08-10 NOTE — DISCHARGE NOTE FOR THE EXPIRED PATIENT - SECONDARY DIAGNOSIS.
T2DM (type 2 diabetes mellitus) Renal mass, right Hypothyroidism, unspecified type Essential hypertension Alzheimer's dementia with behavioral disturbance, unspecified timing of dementia onset

## 2017-08-14 DIAGNOSIS — F02.81 DEMENTIA IN OTHER DISEASES CLASSIFIED ELSEWHERE, UNSPECIFIED SEVERITY, WITH BEHAVIORAL DISTURBANCE: ICD-10-CM

## 2017-08-14 DIAGNOSIS — A41.9 SEPSIS, UNSPECIFIED ORGANISM: ICD-10-CM

## 2017-08-14 DIAGNOSIS — Z87.442 PERSONAL HISTORY OF URINARY CALCULI: ICD-10-CM

## 2017-08-14 DIAGNOSIS — Z90.49 ACQUIRED ABSENCE OF OTHER SPECIFIED PARTS OF DIGESTIVE TRACT: ICD-10-CM

## 2017-08-14 DIAGNOSIS — Z99.81 DEPENDENCE ON SUPPLEMENTAL OXYGEN: ICD-10-CM

## 2017-08-14 DIAGNOSIS — E11.9 TYPE 2 DIABETES MELLITUS WITHOUT COMPLICATIONS: ICD-10-CM

## 2017-08-14 DIAGNOSIS — Z51.5 ENCOUNTER FOR PALLIATIVE CARE: ICD-10-CM

## 2017-08-14 DIAGNOSIS — R13.10 DYSPHAGIA, UNSPECIFIED: ICD-10-CM

## 2017-08-14 DIAGNOSIS — G30.9 ALZHEIMER'S DISEASE, UNSPECIFIED: ICD-10-CM

## 2017-08-14 DIAGNOSIS — N39.0 URINARY TRACT INFECTION, SITE NOT SPECIFIED: ICD-10-CM

## 2017-08-14 DIAGNOSIS — E03.9 HYPOTHYROIDISM, UNSPECIFIED: ICD-10-CM

## 2017-08-14 DIAGNOSIS — N13.5 CROSSING VESSEL AND STRICTURE OF URETER WITHOUT HYDRONEPHROSIS: ICD-10-CM

## 2017-08-14 DIAGNOSIS — Z79.84 LONG TERM (CURRENT) USE OF ORAL HYPOGLYCEMIC DRUGS: ICD-10-CM

## 2017-08-14 DIAGNOSIS — I10 ESSENTIAL (PRIMARY) HYPERTENSION: ICD-10-CM

## 2017-08-14 DIAGNOSIS — R09.02 HYPOXEMIA: ICD-10-CM

## 2017-08-14 DIAGNOSIS — Z86.718 PERSONAL HISTORY OF OTHER VENOUS THROMBOSIS AND EMBOLISM: ICD-10-CM

## 2018-01-12 NOTE — PATIENT PROFILE ADULT. - BLOOD AVOIDANCE/RESTRICTIONS, PROFILE
Pt well known to this dept for prior admissions as well as course of outpatient therapy. Last MBS August 2017 with RX for soft diet & nectar thick fluids. Pt has PEG (due to poor PO intake) none

## 2018-01-17 NOTE — PATIENT PROFILE ADULT. - BRADEN SCORE (IF 18 OR LESS ACTIVATE SKIN INJURY RISK INCREASED GUIDELINE), MLM
CC:  Romana River is here today for birth control change. Medications: medications verified and updated  Refills needed today? NO  Pharmacy: [facility]  Patient would like communication of their results via:      Cell Phone:   Telephone Information:   Mobile [phone] to leave a message containing results? Yes     Patient states she would like to change birth control because she has lost her sex drive with current birth co    If your visit includes an exam today, would you like an assistant to be present in the room during that time? NO        Health Maintenance Summary     Topic Due On Due Status Completed On    IMMUNIZATION - HPV Jan 11, 2017 Overdue Oct 19, 2016    PAP SMEAR - CERVICAL CANCER SCREENING Apr 15, 2019 Not Due Apr 15, 2016    Immunization - TDAP Pregnancy  Hidden     IMMUNIZATION - DTaP/Tdap/Td Nov 7, 2018 Not Due Nov 7, 2008    Immunization-Influenza Sep 1, 2017 Overdue     Depression Screening Nov 19, 2005 Overdue           Patient is due for topics as listed above, she wishes to proceed with Influenza Immunization(s).     Mo Kemp 17

## 2018-02-24 NOTE — ED PROVIDER NOTE - CROS ED EYES ALL NEG
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 On admission imaging and lab, showed low concerns for active infection. negative...

## 2018-05-27 NOTE — H&P ADULT. - [facility] HE PE MLT D E PC
Problem: At Risk for Falls  Goal: # Takes action to control fall-related risks  Outcome: Outcome Met, Continue evaluating goal progress toward completion   05/27/18 9372   OTHER   Participates in Fall Prevention Activities Yes       Discussed with patient unit specific fall prevention intervention. Sign on door, bed and chair alarm in place and functioning appropriately, red socks on and bed at low height. Discussed with patient importance of waiting for assistance with ambulation and transferring. Patient verbalized understanding and demonstrated appropriate call light use.       regular rate and rhythm

## 2018-07-02 NOTE — H&P ADULT. - CARDIOVASCULAR DETAILS
Pt had a spontaneous emesis after drinking coffee. Pt given quease aromatherapy and zofran 4 mg IV. Another liter NS hung. Pt remains up in chair. Call light within reach.    positive S2/positive S1

## 2018-07-22 PROBLEM — N28.9 NON-FUNCTIONING KIDNEY: Status: ACTIVE | Noted: 2017-02-17

## 2018-09-23 NOTE — PATIENT PROFILE ADULT. - NS PRO OT REFERRAL QUES 2 YN
Emergency [address]  EMERGENCY DEPARTMENT    Patient: Marko Hoffmann  MRN: [id number]  : 1965  Date of Evaluation: 2018  ED CELIO Provider: EVANGELINA Husain    Chief Complaint       Chief Complaint   Patient presents with    Knee Pain     reports pain and swelling in right knee. per pt he has this happen every 4-6 weeks but this morning the pain is more \"steady\" and he reports less range of motion than usual.      See Diaz is a 48 y.o. male who presents to the emergency department For evaluation of what seems to be recurrent right knee pain independent of any known traumatic mechanism. Patient states that \"I get this about every 6 weeks\" referring to right-sided inferior knee pain which seems to present without any precipitating factors. Patient normally describes his pain to the inferior portion of his right knee however today states that his pain distribution is more on the medial aspect of his knee again he denies any known traumatic mechanism denies any injury from which she could explain his symptoms he simply complains of recurrent idiopathic right knee pain which she characterizes as a 5 out of 10 aching pain somewhat worse with standing and describes it as more of a \"steady pain today\"  without other radiation aggravating or alleviating factors ×4-6 weeks on and off more consistent over the last 1-2 days    ROS:     Review of Systems 1-2 day history of recurrence of recurrent right knee pain which is idiopathic to the patient  At least 10 systems reviewed and otherwise acutely negative except as in the [address]     Past History     Past Medical History:   Diagnosis Date    Arthritis     Hypertension      Past Surgical History:   Procedure Laterality Date    KNEE SURGERY      right mcl 30 years ago     Social History     Social History    Marital status:      Spouse name: N/A    Number of children: N/A    Years of education: N/A Social History Main Topics    Smoking status: Never Smoker    Smokeless tobacco: Current User     Types: Snuff    Alcohol use Yes      Comment: 5/week    Drug use: No    Sexual activity: Yes     Partners: Female     Other Topics Concern    None     Social History Narrative    None       Medications/Allergies     Previous Medications    ASPIRIN 81 MG EC TABLET    TAKE ONE TABLET BY MOUTH DAILY    HYDROCHLOROTHIAZIDE (HYDRODIURIL) 25 MG TABLET    TAKE ONE TABLET BY MOUTH EVERY MORNING    LOSARTAN (COZAAR) 25 MG TABLET    TAKE ONE TABLET BY MOUTH DAILY    MULTIPLE VITAMINS-MINERALS (THERAPEUTIC MULTIVITAMIN-MINERALS) TABLET    Take 1 tablet by mouth daily    NIFEDIPINE (ADALAT CC) 90 MG EXTENDED RELEASE TABLET    Take 1 tablet by mouth daily    OMEGA-3 FATTY ACIDS (FISH OIL) 1000 MG CAPS    Take 3,000 mg by mouth 3 times daily    OMEPRAZOLE (PRILOSEC) 40 MG DELAYED RELEASE CAPSULE    TAKE ONE CAPSULE BY MOUTH EVERY MORNING BEFORE BREAKFAST     No Known Allergies     Physical Exam       ED Triage Vitals [09/23/18 0622]   BP Temp Temp src Pulse Resp SpO2 Height Weight   (!) 166/105 98.4 °F (36.9 °C) -- 97 14 94 % 6' 0.05\" (1.83 m) 228 lb (103.4 kg)     Physical Exam  Normotensive non-tachycardic afebrile nontachypneic satting well on room air  Constitutional:  Well-nourished well-developed in no acute distress  Eyes:  PERRLA, EOMI x6, no nystagmus no photophobia  HENT:  Teeth and tongue intact, uvula midline, no PTA or retropharyngeal abscess noted no other intraoral lesion or edema appreciated patient tolerating secretions well, no stridor, no nuchal rigidity  Respiratory:  Clear to auscultation bilaterally, no crepitus or subcutaneous emphysema noted with palpation in the bilateral chest wall  Cardiovascular:  S1-S2, no S3  GI:  Abdomen soft nontender nondistended  :  Exam deferred for now no subjective complaints  Musculoskeletal:  Distally neurovascularly intact 2+ pulses normal capillary refill gross ONCE      Last MAR action:  Given - by Ricardo Truong on 09/23/18 at [facility]          Final Impression      1. Chronic pain of right knee        DISPOSITION Decision To Discharge 09/23/2018 08:11:33 AM     Patient seen independently with an Emergency Medicine attending available for supervision.     (Please note that portions of this note may have been completed with a voice recognition program. Efforts were made to edit the dictations but occasionally words are mis-transcribed.)    Randal Councilman, [address], [place]  09/23/18 5160 no

## 2018-12-17 NOTE — PHARMACY COMMUNICATION NOTE - COMMENTS
spoke to DAVE chavez, verified change from [id number] with 40meq KCL to 1000ml D5W/0.45NACL with 40meqKcl 8/7/17 at 1pm due to formulary
verified with NP scott, one dose only and change from [id number] with 40MeQ KCL to 1000ml D5W/0.45NACL, with 40meqKCL, 8/7/17 1pm
tremor(s)/agitation/mood swings/disorientation/irritability/nausea/sleeplessness/vomiting

## 2019-09-16 NOTE — ED ADULT NURSE NOTE - PAIN: PRESENCE, MLM
Diabetes Mellitus: At goal but he is interested in working with a dietician. He feels as if his diabetes management is taking up most of his free time and perhaps some extra support will help streamline that.   We will have him follow-up next month after the a1c to see where his control is off metformin.    Plan:I have recommended the following steps for improving diabetic care and outcome to him: referral to Diabetic Education department and/or ongoing followup with diabetic education on an as needed basis, annual eye examinations at Ophthalmology discussed, glycohemoglobin and other lab monitoring discussed, long term diabetic complications discussed and labs immediately prior to next visit.      Patient education:  Importance of reducing cardiovascular risk, glycemic control, regular monitoring, consistency in medication use, and routine/preventive care reviewed.    I have reviewed diabetes in detail with him today. I have discussed the importance of maintaining good diabetes control to prevent complications. I answered all of his questions and he is comfortable with the plan as outlined. he voices understanding of the follow-up plan including labs, office visits, and was provided with an after visit summary. he will call our office with any further questions, concerns, or problems.     complains of pain/discomfort

## 2020-01-10 NOTE — PATIENT PROFILE ADULT. - SOURCE OF INFORMATION, PROFILE
"Chief Complaint   Patient presents with     Cough       Initial Temp 97.2  F (36.2  C) (Tympanic)   Ht 0.673 m (2' 2.5\")   Wt 8.074 kg (17 lb 12.8 oz)   HC 45.7 cm (18\")   BMI 17.82 kg/m   Estimated body mass index is 17.82 kg/m  as calculated from the following:    Height as of this encounter: 0.673 m (2' 2.5\").    Weight as of this encounter: 8.074 kg (17 lb 12.8 oz).  Medication Reconciliation: complete  Aaron Sales LPN  "
family

## 2020-08-14 NOTE — PROGRESS NOTE ADULT - ASSESSMENT
80 year old with advanced dementia and sepsis on IV antibiotics
81 yr old female with PMHx of  with advanced dementia, DVT, , hypothyroidism, R ureteral obstruction 2/2 mass s/p nephrostomy tube and prolonged hospitalization for diarrhea.  D/C home on home hospice and came back to [facility] 2 days later with dyspnea.   Pt without any clinical improvement in clinical or functional status on Bipap, IV abx, IVF.  Morphine IV x 2 doses in 24 hr  NPO. Continues to run low grade temps.    Family meeting with Valarie Tapia, cheryl Gamez. Discussed poor prognosis and unlikely recovery from septic event with already severe debility secondary to dementia and renal mass.  Discussed GOC. Based upon known pt wishes they have decided to focus on symptom management and they wish to reduce suffering. They have decided to d/c bipap, continue IV abx, treat dyspnea and pain with IV opiates and anxiolytics PRN. Pt on 100% NRB-did trial of 55% Fio2 but sats were dropping.
3-4 lower back pain

## 2020-12-02 NOTE — DISCHARGE NOTE ADULT - DISCHARGE DATE
I have entered orders to expedite patient care due to limited ED space capability. I have not examined or personally evaluated this patient.            Gabrielle Olmos MD  12/02/20 9184 16-Feb-2017

## 2020-12-15 PROBLEM — J06.9 URTI (ACUTE UPPER RESPIRATORY INFECTION): Status: RESOLVED | Noted: 2017-03-27 | Resolved: 2020-12-15

## 2020-12-30 NOTE — PATIENT PROFILE ADULT. - AS SC BRADEN FRICTION
Addendum    The patient was seen and examined this morning.  He reported that he was found to be hypertensive in February and was discharged home on amlodipine 5 mg daily during hospital visit.  He was supposed to follow-up with his primary care physician but could not get an appointment as it kept on getting delayed 2/2 COVID-19 pandemic.  Since 12/26/2020 he noticed left-sided nosebleed after blowing his nose and a day later he noticed spontaneous nosebleeds without any trigger.  The epistaxis would last for 15-20 minutes and spontaneously resolve.  He came to the ER on 12/28/2020 and was found to be hypertensive and epistaxis deemed 2/2 dry heat at his home.  He was discharged home with amlodipine 5 mg daily.  The patient presented again on 12/29/2020 with persistent symptoms of epistaxis which was more persistent and frequent.  He does not report any similar symptoms of epistaxis in the past. He did not report picking on his nose. No headache, dizziness, blurry vision or vision changes.  No new medications.  No family history of major medical problems or bleeding disorders.  He reports increased stress in life recently 2/2 relationship issues.  He also reports tobacco smoking, 1 pack/day since 13 years.  He has been drinking alcohol more frequently in the recent past 2/2 stress in life. Last alcohol 2 days ago and no history of alcohol withdrawal. He also reports smoking marijuana occasionally.  No cocaine snorting or other IV drug use.  No intravenous heroin use.  No recent exposures to HIV or hepatitis C virus.  He does not report any history of STIs or multiple sexual partners.    Agree with Dr. Smith's physical exam findings and assessment/plan except for the additions/modifications below.    Hypertensive emergency  -Initial BP on presentation 212/152  - labs: Serum creatinine 1.26, hemoglobin 14.7, troponin less than 0.02x2  -EKG: Normal sinus rhythm, left ventricular hypertrophy, T wave inversion in 1,  aVL,   -Status post hydralazine 25 mg p.o. and nicardipine drip in the ER  Plan  -Nicardipine drip with goal blood pressure reduction 25% of the initial BP within the first 24 hours-goal for the first 24 hours: 160s/110s  -Hydralazine 10 mg IV every 8 hours as needed for systolic BP greater than 180  -will transition off IV medications and add p.o. blood pressure medications tomorrow (after 24 hours)  - continue to monitor on telemetry    Epistaxis  -S/p Rhino Rocket in the left nares in the ER  -Presently bleeding controlled  -Hemoglobin 14.7  Plan  -ENT on consult, will appreciate recommendations  -Follow repeat CBC    MY vs CKD  -Serum creatinine 1.26 on presentation  -Obtain urine electrolytes: FENA 3.5  -Monitor I's and O's  -Follow-up repeat BMP  - follow urinalysis    Left ventricular hypertrophy  -EKG concerning for left ventricular hypertrophy possible 2/2 uncontrolled BP  -Obtain transthoracic echocardiogram    Discussed with attending, Dr. Mccracken.    Heraclio Yates MD,   PGY3,  [id number].        Internal Medicine History & Physical                                                                  Patient Name: Ovidio Sierra    YOB: 1985    MRN: [id number]         Date of Service: 12/30/2020     Subjective     Source: patient and EMR     Preferred Language: english    PCP: Pcp Not In System    Chief Complaint:   Chief Complaint   Patient presents with   • Epistaxis     HPI:  Ovidio Sierra is a 35 year old male PMH uncontrolled HTN who who presented to [facility] on 12/30 for uncontrolled nosebleeds. He initially presented on the 29th with nosebleeds and high blood pressure was given amlodipine 5 mg p.o. and discharge from the emergency department. When he came back today, his blood pressure was 212/152. He denies any lightheadedness, dizziness, changes in vision, headaches. His only complaint is the nosebleeding. He has never had nosebleeding before. He does admit to being diagnosed with  high blood pressure in on 2 separate occasions in the past, once at an urgent care and once here where he was given a 1 week prescription of amlodipine 5 mg and told to follow-up with the PCP outpatient. Patient states that he never established care with the PCP due to the pandemic. He does state that he has been under increased stress lately due to relationship issues and has increased his smoking from about half a pack of cigarettes a day to 1pack. He has been smoking since he was about 13 years old. He does occasionally smoke marijuana and drink alcohol but on a social basis. He believes that his father passed from a heart attack but is unsure, and is unaware of any other family medical history.    On arrival to the ED, BP was 212/153. Patient was given hydralazine 25 mg p.o. without any change in blood pressure. Patient was later started on a nicardipine drip. EKG was significant for ST elevations in V1 through V5, with reciprocal T wave inversions in 1 and aVL. Labs still pending.    Review of Systems:   Constitutional: Denies fever, chills, sweats   HEENT: + Nose bleeds. Denies recent vision changes, double vision, nasal congestion, sore throat   Cardiovascular: Denies CP, palpitations, peripheral edema, syncope   Respiratory: Denies SOB, wheezing, cough, hemoptysis   GI: Denies N/V/D/C, abdominal pain, hematemesis, melena, hematochezia   : Denies dysuria, hematuria   MSK: Denies muscle pain   Skin: Denies rash, pruritus   Heme/lymph: Denies LAD   Neuro: Denies numbness, tingling, weakness, headache     All other systems are negative.    Past Medical History:   Diagnosis Date   • HTN (hypertension)      (Not in a hospital admission)    ALLERGIES:   Allergen Reactions   • Seafood   (Food) HIVES and PRURITUS     Throat itches really bad      History reviewed. No pertinent surgical history.  History reviewed. No pertinent family history.  Social History     Tobacco Use   • Smoking status: Current Every Day  Smoker   • Smokeless tobacco: Never Used   Substance Use Topics   • Alcohol use: Yes   • Drug use: Yes     Types: Marijuana       Objective     Vitals:    12/30/20 0433   BP: (!) 192/144   Pulse: 80   Resp: 18   Temp:        Physical Exam  General: AOx4, NAD, appears stated age, resting in bed   HEENT: With o+ nose bleeding with rhino rocket in place. tnormocephalic, atraumatic, EOMI, PERRL, MMM   Neck: supple, nontender, no LAD   CV: RRR, +S1 +S2, no murmurs/rubs/gallops. Carotid, radial, and dorsalis pedis pulses +2/4 b/l. No JVD   Lungs: CTAB, no crackles/rhonchi/wheezing   Abdomen: soft, nontender, nondistended, BS present, tympanic to percussion. No rigidity, rebound or guarding.   Extremities: no peripheral edema, clubbing, or cyanosis   Neuro: CN 2-12 grossly intact   Skin: warm, dry, intact    No intake or output data in the 24 hours ending 12/30/20 0521     Recent Results (from the past 24 hour(s))   CBC with Automated Differential (performable only)    Collection Time: 12/30/20  4:58 AM   Result Value Ref Range    WBC 6.3 4.2 - 11.0 K/mcL    RBC 5.68 4.50 - 5.90 mil/mcL    HGB 14.7 13.0 - 17.0 g/dL    HCT 46.8 39.0 - 51.0 %    MCV 82.4 78.0 - 100.0 fl    MCH 25.9 (L) 26.0 - 34.0 pg    MCHC 31.4 (L) 32.0 - 36.5 g/dL    RDW-CV 13.5 11.0 - 15.0 %     140 - 450 K/mcL    NRBC 0 <=0 /100 WBC    Neutrophil, Percent 43 %    Lymphocytes, Percent 44 %    Mono, Percent 9 %    Eosinophils, Percent 3 %    Basophils, Percent 1 %    Immature Granulocytes 0 %    Absolute Neutrophils 2.8 1.8 - 7.7 K/mcL    Absolute Lymphocytes 2.8 1.0 - 4.8 K/mcL    Absolute Monocytes 0.6 0.3 - 0.9 K/mcL    Absolute Eosinophils  0.2 0.0 - 0.5 K/mcL    Absolute Basophils 0.0 0.0 - 0.3 K/mcL    Absolute Immmature Granulocytes 0.0 0.0 - 0.2 K/mcL    RDW-SD 39.8 39.0 - 50.0 fL   Electrocardiogram 12-Lead    Collection Time: 12/30/20  5:11 AM   Result Value Ref Range    Ventricular Rate EKG/Min (BPM) 87     Atrial Rate (BPM) 87      GA-Interval (MSEC) 160     QRS-Interval (MSEC) 108     QT-Interval (MSEC) 372     QTc 447     P Axis (Degrees) 50     R Axis (Degrees) 26     T Axis (Degrees) 122     REPORT TEXT       Normal sinus rhythm  Possible  Left atrial enlargement  Left ventricular hypertrophy  Nonspecific T wave abnormality  Abnormal ECG  No previous ECGs available         Current Facility-Administered Medications   Medication Dose Route Frequency Provider Last Rate Last Admin   • niCARdipine (CARDENE) 40 mg/200 mL in NaCl 0.83 % infusion  0-15 mg/hr Intravenous Continuous Haley Uriostegui MD 12.5 mL/hr at 12/30/20 0508 2.5 mg/hr at 12/30/20 0508     Current Outpatient Medications   Medication Sig Dispense Refill   • amLODIPine (NORVASC) 5 MG tablet Take 1 tablet by mouth daily. 30 tablet 0          Assessment and Plan     Monica Sierra is a 35-year-old male with uncontrolled hypertension on no medications at home who presented to [facility] on 12/30 uncontrolled nosebleeding and was admitted for hypertensive emergency.      #Hypertensive emergency  - Diagnosed with hypertension in the past but never followed up with the PCP to resume antihypertensive medications  - /153 on arrival to the ED, status post 25 mg hydralazine  - EKG significant for ST elevations in V1 through V5 with T wave inversions in 1, 2 and aVL. Couldn't possibly be repolarization abnormalities, but will f/u trops and repeat EKG to r/o ischemia in the setting of hypertensive emergency  - Labs pending, trend troponin x2  - Follow-up repeat EKG  - Continue nicardipine drip for map goal 115 within first hour    #Epistaxis   - Likely 2/2 hypertensive emergency  - Rhino Rocket in place  - ENT consulted    KAITLIN  Fluids: PO  Electrolytes: replace PRN  Nutrition: NPO except meds  Activity: advance to baseline    PPx:  VTE: heparin 5000 units subQ Q8H  GI: not indicated    Code Status: FULL CODE    Dispo: inpatient    Janneth Smith DO  Resident Physician  Dept of Internal  Medicine  [employer]:    (3) no apparent problem

## 2021-02-07 NOTE — PROGRESS NOTE ADULT - PROBLEM SELECTOR PLAN 10
02/07/21 1337   OTHER   Discipline physical therapist   Rehab Time/Intention   Session Not Performed patient/family declined treatment  (pt had just gotten out of the shower and was exhausted, states she is agreeable to walk with nursing staff later, PT will follow up tomorrow)   Recommendation   PT - Next Appointment 02/08/21      on standing KCl supplementation

## 2021-05-17 NOTE — PROGRESS NOTE ADULT - SUBJECTIVE AND OBJECTIVE BOX
Patient is a 81y old  Female who presents with a chief complaint of Diarrhea (07 Jul 2017 10:50)        SUBJECTIVE / OVERNIGHT EVENTS:    no acute events o/n  pt lethargic,resting comfortably, awakens to verbal stimuli  no apparent distress    MEDICATIONS  (STANDING):  sertraline 50 milliGRAM(s) Oral daily  atorvastatin 10 milliGRAM(s) Oral at bedtime  ferrous    sulfate 325 milliGRAM(s) Oral daily  levothyroxine 75 MICROGram(s) Oral daily  insulin lispro (HumaLOG) corrective regimen sliding scale   SubCutaneous three times a day before meals  insulin lispro (HumaLOG) corrective regimen sliding scale   SubCutaneous at bedtime  dextrose 5%. 1000 milliLiter(s) (50 mL/Hr) IV Continuous <Continuous>  dextrose 50% Injectable 12.5 Gram(s) IV Push once  dextrose 50% Injectable 25 Gram(s) IV Push once  dextrose 50% Injectable 25 Gram(s) IV Push once  sodium chloride 0.9%. 1000 milliLiter(s) (50 mL/Hr) IV Continuous <Continuous>  valproic  acid Syrup 250 milliGRAM(s) Oral three times a day  doxazosin 1 milliGRAM(s) Oral at bedtime  potassium chloride   Powder 40 milliEquivalent(s) Oral daily  fentaNYL   Patch  12 MICROgram(s)/Hr 1 Patch Transdermal every 72 hours    MEDICATIONS  (PRN):  dextrose Gel 1 Dose(s) Oral once PRN Blood Glucose LESS THAN 70 milliGRAM(s)/deciliter  glucagon  Injectable 1 milliGRAM(s) IntraMuscular once PRN Glucose LESS THAN 70 milligrams/deciliter  QUEtiapine 25 milliGRAM(s) Oral every 4 hours PRN For agitation  artificial  tears Solution 1 Drop(s) Both EYES four times a day PRN Dry Eyes  polyethylene glycol 3350 17 Gram(s) Oral daily PRN Constipation  acetaminophen    Suspension. 650 milliGRAM(s) Oral every 6 hours PRN Mild Pain (1 - 3) and moderate pain  bisacodyl Suppository 10 milliGRAM(s) Rectal daily PRN Constipation  acetaminophen   Tablet. 650 milliGRAM(s) Oral every 6 hours PRN Moderate Pain (4 - 6)  acetaminophen  Suppository 650 milliGRAM(s) Rectal every 6 hours PRN For Temp greater than 38 C (100.4 F)      Vital Signs Last 24 Hrs  T(C): 37.6 (26 Jul 2017 12:58), Max: 37.6 (26 Jul 2017 12:58)  T(F): 99.6 (26 Jul 2017 12:58), Max: 99.6 (26 Jul 2017 12:58)  HR: 75 (26 Jul 2017 12:58) (75 - 82)  BP: 127/63 (26 Jul 2017 12:58) (127/63 - 134/77)  BP(mean): --  RR: 19 (26 Jul 2017 12:58) (19 - 20)  SpO2: 95% (26 Jul 2017 12:58) (92% - 95%)  CAPILLARY BLOOD GLUCOSE  140 (26 Jul 2017 12:50)  173 (26 Jul 2017 08:50)  162 (25 Jul 2017 22:18)  143 (25 Jul 2017 18:34)        I&O's Summary        PHYSICAL EXAM  GENERAL: NAD, well-developed  NECK: Supple, No JVD  CHEST/LUNG: Clear to auscultation bilaterally; No wheeze  HEART: Regular rate and rhythm; No murmurs, rubs, or gallops  ABDOMEN: Soft, Nontender, Nondistended; Bowel sounds present  EXTREMITIES:  2+ Peripheral Pulses, No clubbing, cyanosis, or edema      LABS:                        7.8    30.61 )-----------( 817      ( 26 Jul 2017 06:01 )             24.1     07-26    140  |  101  |  10  ----------------------------<  157<H>  3.7   |  28  |  0.34<L>    Ca    10.2      26 Jul 2017 06:01  Phos  2.5     07-26  Mg     1.8     07-26                RADIOLOGY & ADDITIONAL TESTS:    Imaging Personally Reviewed:  Consultant(s) Notes Reviewed:  Speech & Swallow  Care Discussed with Consultants/Other Providers: None

## 2022-02-23 NOTE — PROGRESS NOTE ADULT - PROBLEM SELECTOR PLAN 2
resolved. s/p flagyl x 10 d  -Ct A/p noted , Marked improvement in hydronephrosis on the right.   However, ill-defined low density collection in the right posterior   pararenal space has progressed in the interim.   urology consult  noted, Nephrostomy tube changed by IR on 7/10, Plan for Rt nephroureteral stent change by IR today pt c h/o obesity c atraumatic L thigh pain. nvi. check xr r/o fx. check duplex r/o dvt. likely nerve pain. tylenol, nsaids , lidoderm for pain. pmd fu pt c h/o obesity c atraumatic L thigh pain. nvi. check xr r/o fx. check duplex r/o dvt. likely nerve pain. tylenol, nsaids , lidoderm for pain. pmd fu    duplex and xr neg. f/u pmd

## 2022-03-25 NOTE — PROGRESS NOTE ADULT - PROBLEM SELECTOR PLAN 1
Family does not want aggressive measures. Family doesn't want bx or any surgical intervention  overall prognosis is poor NO

## 2022-12-01 NOTE — PHYSICAL THERAPY INITIAL EVALUATION ADULT - REHAB POTENTIAL, PT EVAL
You were found to have a left wrist and right ankle fracture. Please follow-up with an orthopedist to make an appointment as soon as possible. Take Tylenol, ibuprofen, the muscle relaxer, and oxycodone as needed for breakthrough pain.
good, to achieve stated therapy goals

## 2023-01-17 NOTE — DIETITIAN INITIAL EVALUATION ADULT. - NUTRITION INTERVENTION
Subjective   Joel Keating is a 66 y.o. male.     History of Present Illness     Patient is a 66-year-old male who came in for follow-up.  He is new to me.    He has primary hypothyroidism and is on Synthroid 112 mcg/day.  He has no history of goiter or head/neck radiation therapy.    He has prediabetes and is not on medications.  He had serum glucose 206 mg per DL after he passed out during a nuclear stress test.  He does not know whether he was given steroids or epinephrine at that time.  He has gained 2 pounds since March 2022.  His maternal grandmother had diabetes mellitus.    Cardiac catheterization done in October 2022 showed 30 to 40% stenosis of the mid LAD and 40 to 50% stenosis of the proximal RCA.    He has hyperlipidemia and is on diet alone.  Lipid panel done in December 2022 are as follows: Cholesterol 152.  HDL 48.  LDL 68.  Triglycerides 219.    He has sleep apnea and is seeing Dr. Tamez.  He is unable to tolerate the CPAP.  He has tried a dental appliance which did not help.  He has seen Dr. Chinchilla and Dr. Jacob and was offered inspire therapy.    He has chronic pain and is on methadone prescribed by Dr. Back and gabapentin prescribed by Dr. Hawkins.  He has been seen in [place] and [facility].    The following portions of the patient's history were reviewed and updated as appropriate: allergies, current medications, past family history, past medical history, past social history, past surgical history and problem list.    Review of Systems   Eyes: Negative for visual disturbance.   Respiratory: Negative for shortness of breath and wheezing.    Cardiovascular: Negative for chest pain and palpitations.   Gastrointestinal: Positive for constipation (had colonoscopy in Dec 2022).   Genitourinary: Negative.    Musculoskeletal: Negative for myalgias.   Neurological: Negative for numbness.     Vitals:    01/17/23 1129   BP: 118/80   Pulse: 78   Temp: 92.2 °F (33.4 °C)   TempSrc: Temporal  "  SpO2: 96%   Weight: 102 kg (225 lb 6.4 oz)   Height: 188 cm (74\")      Objective   Physical Exam  Constitutional:       General: He is not in acute distress.     Appearance: Normal appearance. He is not ill-appearing or toxic-appearing.   Eyes:      General: No scleral icterus.        Right eye: No discharge.         Left eye: No discharge.      Extraocular Movements: Extraocular movements intact.      Conjunctiva/sclera: Conjunctivae normal.   Neck:      Vascular: No carotid bruit.   Cardiovascular:      Rate and Rhythm: Normal rate and regular rhythm.      Heart sounds: Normal heart sounds.   Pulmonary:      Effort: Pulmonary effort is normal. No respiratory distress.      Breath sounds: Normal breath sounds. No rales.   Chest:      Chest wall: No tenderness.   Abdominal:      General: Bowel sounds are normal.      Palpations: Abdomen is soft.      Tenderness: There is no right CVA tenderness or left CVA tenderness.   Musculoskeletal:      Right lower leg: No edema.      Left lower leg: No edema.   Lymphadenopathy:      Cervical: No cervical adenopathy.   Skin:     General: Skin is warm.   Neurological:      Mental Status: He is alert and oriented to person, place, and time.   Psychiatric:         Mood and Affect: Mood normal.         Behavior: Behavior normal.       Admission on 12/14/2022, Discharged on 12/14/2022   Component Date Value Ref Range Status   • COVID19 12/14/2022 Not Detected   Final   • Influenza A Antigen KANDY 12/14/2022 Detected (A)   Final   • Influenza B Antigen KANDY 12/14/2022 Not Detected   Final   • Internal Control 12/14/2022 Passed   Final   • Lot Number 12/14/2022 [id number]   Final   • Expiration Date 12/14/2022 [id number]   Final     Assessment & Plan   Diagnoses and all orders for this visit:    1. Acquired hypothyroidism (Primary)    2. Non-obstructive coronary artery disease    3. Fibromyalgia    4. Obstructive sleep apnea, adult      Continue Synthroid 112 mcg/day.  Advised to discuss " about statin therapy with Dr. Johnson  Follow-up with Dr. Back.  Follow-up with Dr. Tamez.    Copy of my note sent to Dr. Hoover, Dr. Tamez, Dr. Johnson, Dr. Back, Dr. Jackson and Dr. Hawkins.    RTC 6 mos.          Meals and Snack/Nutrition Education

## 2023-02-02 NOTE — PROGRESS NOTE ADULT - PROBLEM SELECTOR PLAN 7
bed rails DVT chronic   holding eliquis at this time  repeated RLE Doppler - negative  no indication to resume A/C at this time

## 2023-05-17 NOTE — H&P ADULT. - DOCUMENT STATUS
Detail Level: Detailed Add 43085 Cpt? (Important Note: In 2017 The Use Of 85537 Is Being Tracked By Cms To Determine Future Global Period Reimbursement For Global Periods): yes Authored by Resident/PA/NP

## 2023-06-14 NOTE — DISCHARGE NOTE ADULT - BECAUSE OF A PHYSICAL, MENTAL OR EMOTIONAL CONDITION, DO YOU HAVE DIFFICULTY DOING  ERRANDS ALONE LIKE VISITING A DOCTOR'S OFFICE OR SHOPPING (15 YEARS AND OLDER)
FAMILY MEDICINE OFFICE VISIT    CHIEF COMPLAINT:    Chief Complaint   Patient presents with   • Office Visit   • Medication Management       SUBJECTIVE:  Tesfaye Madrigal is a 68 year old male who presents requesting evaluation for:    Diabetes:  Patient is taking metformin 1000 mg twice a day, glimepiride 4 mg in the morning, 4 mg in the evening (he's actually supposed to take glyburide 5 mg BID but accidentally picked up glimepiride at pharmacy - will go back to glyburide when he's done with 90 day prescription), Januvia 50 mg daily.    Sometimes stool is loose. After taking OTC omeprazole, stool is no longer soft.  Blood sugars at home <150 in the morning. There has been days when blood sugar is 50 in the morning (wakes him up, feels buzzed). Hypoglycemia happens 1-2x a month (if he eats early dinner at 4pm and eats a light snack like popcorn or fruit).   Diet: Protein shake for breakfast. Sometimes eats pizza, Italian beef sandwich, burgers. Otherwise bread at home is whole wheat.   Does report some numbness and tingling in the feet when the legs are swollen.    Hypertension: He is taking valsartan 320 mg daily, nifedipine 60 mg daily, carvedilol 80 mg daily. Home BPs 120-130/80.    Hyperlipidemia: He is taking rosuvastatin 40 mg daily, fenofibrate 160 mg daily. Rare alcohol intake.     Smoking more when nervous. Down 1/4-1/2 ppd.    REVIEW OF SYSTEMS:    GENERAL:  No fever  RESPIRATORY:  No cough  All other systems reviewed and negative.       PAST HISTORIES:  Past Medical History:   Diagnosis Date   • CAD (coronary artery disease) of bypass graft 2010    s/p CABG x 5   • CVA (cerebral vascular accident) (CMD) 2007    went to [facility] due to vertebral artery?   • DM2 (diabetes mellitus, type 2) (CMD)    • HTN (hypertension), benign    • Hypertriglyceridemia       Allergies, Medications, Medical history, Surgical history, Social history and Family history were reviewed and updated.    OBJECTIVE:  PHYSICAL EXAM:     Visit Vitals  BP (!) 149/83   Pulse (!) 59   Temp 98.3 °F (36.8 °C) (Temporal)   Resp 16   Ht 5' 11\" (1.803 m)   Wt 95.7 kg (210 lb 13.9 oz)   BMI 29.41 kg/m²   Home /80  General: Normal development.  Well nourished. Well groomed.  Respiratory:  Normal respiratory effort. Lungs clear to auscultation bilaterally.  Cardiovascular:  Regular rate and rhythm. Normal S1, S2 without murmurs. No lower extremity edema.  Abdomen: Soft, not tender, not distended.  Neuro:  Cranial nerves II-XII are grossly intact without focal deficits.  Psychiatric:  Alert and oriented x 3. Appropriate mood and affect. Normal judgment and insight.  Diabetic foot exam:  Right: Skin integrity is normal, no rash or callus. Dorsalis pedis pulse is present.  Pressure sensation using the [place]-Fabián monofilament is present in all 10 spots checked.  Left: Skin integrity is normal, no rash or callus. Dorsalis pedis pulse is present.  Pressure sensation using the [place]-Fabián monofilament is present in all 10 spots checked.      ASSESSMENT:   1. Type 2 diabetes mellitus with hyperglycemia, without long-term current use of insulin (CMD)    2. Type 2 diabetes mellitus with hypoglycemia without coma, without long-term current use of insulin (CMD)    3. HTN (hypertension), benign    4. Gastroesophageal reflux disease with esophagitis without hemorrhage    5. Hypertriglyceridemia        PLAN:  Orders Placed This Encounter   • Glycohemoglobin   • Comprehensive Metabolic Panel   • Lipid Panel With Reflex   • SITagliptin (JANUVIA) 100 MG tablet   • omeprazole (PrilOSEC) 20 MG capsule     Type 2 diabetes mellitus with hyperglycemia, without long-term current use of insulin (CMD)  (primary encounter diagnosis)  Type 2 diabetes mellitus with hypoglycemia without coma, without long-term current use of insulin (CMD)  Uncontrolled.  Hemoglobin A1c elevated at 7.4%.  However, diabetes is complicated by hypoglycemia.  Discussed and strategies to avoid  hypoglycemia including eating at hardier snack after an early dinner (ie: half piece of wheat toast with peanut butter).   Increase Januvia from 50 mg daily to 100 mg daily.  Continue metformin 1000 mg twice a day.  Will not change sulfonylurea dose at this time. (He accidentally picked up glimepiride instead of glyburide at the pharmacy).     HTN (hypertension), benign:  Well controlled at home.   He will continue to check home blood pressure readings and send a list of his blood pressure readings at home.  Continue current management.     Gastroesophageal reflux disease with esophagitis without hemorrhage  Well controlled.  Continue omeprazole 20 mg daily. Prescription sent to pharmacy.    Hypertriglyceridemia  LDL at goal at 56.  Triglycerides elevated at 325.  Discussed lifestyle modification.  Continue Crestor and fenofibrate.    Return in about 5 months (around 11/14/2023) for Medicare Wellness Visit w/ fasting labs 1 wk ahead.    I spent 41 minutes preparing to see patient (including chart review and preparation), obtaining and reviewing additional medical history, performing physical exam and evaluation, documenting clinical information in electronic medical record, independently interpreting results, and/or coordinating care.        Yes

## 2023-09-06 NOTE — PROGRESS NOTE ADULT - PROBLEM SELECTOR PLAN 2
Spoke with Jack. Informed of generic version. Informed to check with other pharmacies to see if they have the generic 70mg dosage and let us know where it needs to be sent.   IV antibiotics

## 2023-09-06 NOTE — ED PROVIDER NOTE - ENMT NEGATIVE STATEMENT, MLM
complains of pain/discomfort
Ears: no ear pain and no hearing problems.Nose: no nasal congestion and no nasal drainage.Mouth/Throat: no dysphagia, no hoarseness and no throat pain.Neck: no lumps, no pain, no stiffness and no swollen glands.

## 2024-04-03 RX ORDER — LEVOTHYROXINE SODIUM 125 MCG
1 TABLET ORAL
Qty: 0 | Refills: 0 | DISCHARGE

## 2024-04-03 RX ORDER — VALSARTAN 80 MG/1
1 TABLET ORAL
Qty: 0 | Refills: 0 | DISCHARGE

## 2024-04-03 RX ORDER — RISPERIDONE 4 MG/1
1 TABLET ORAL
Qty: 0 | Refills: 0 | DISCHARGE

## 2024-04-03 RX ORDER — METFORMIN HYDROCHLORIDE 850 MG/1
1 TABLET ORAL
Qty: 0 | Refills: 0 | DISCHARGE

## 2024-04-03 RX ORDER — DONEPEZIL HYDROCHLORIDE 10 MG/1
1 TABLET, FILM COATED ORAL
Qty: 0 | Refills: 0 | DISCHARGE

## 2024-04-03 RX ORDER — ASPIRIN/CALCIUM CARB/MAGNESIUM 324 MG
1 TABLET ORAL
Qty: 0 | Refills: 0 | DISCHARGE

## 2024-04-03 RX ORDER — FLUCONAZOLE 150 MG/1
1 TABLET ORAL
Qty: 0 | Refills: 0 | DISCHARGE

## 2024-04-03 RX ORDER — ALENDRONATE SODIUM 70 MG/1
1 TABLET ORAL
Qty: 0 | Refills: 0 | DISCHARGE

## 2024-04-04 PROBLEM — N28.89 OTHER SPECIFIED DISORDERS OF KIDNEY AND URETER: Chronic | Status: ACTIVE | Noted: 2017-03-08

## 2024-05-24 NOTE — ED ADULT NURSE NOTE - ABDOMEN
insulin ([facility])  NIHARIKA Bianchi   soft

## 2024-06-18 NOTE — H&P ADULT. - PROBLEM SELECTOR PLAN 3
[Negative] : Endocrine
– A1c 7.2 (1/2016), will recheck in AM  – Hold PO meds, monitor FSBG on low ISS

## 2024-06-20 NOTE — ED ADULT NURSE NOTE - THOUGHTS OF SUICIDE/SELF-HARM YN, MLM
Ambulatory Visit  Name: Joselyn Maki      : 1954      MRN: [id number]  Encounter Provider: MITZI Otero  Encounter Date: 2024   Encounter department: [facility]    Assessment & Plan   1. Medicare annual wellness visit, subsequent  Comments:  Age appropriate screenings and recommendations discussed. Fasting labs reviewed.  2. Depression, recurrent (HCC)  Assessment & Plan:  Pt reports overall improvement with use of paxil 10 mg daily.  Continue medications as directed.  Encourage self care and healthy coping.   3. Primary hypertension  Assessment & Plan:  Pt recently changed from verapamil to candesartan due to s/e's.   Pt reports she is tolerating without issues at this time. BP stable in office today.  No changes.   4. Acquired hypothyroidism  Assessment & Plan:  Thyroid studies wnl.   Continue levothyroxine as directed.   5. Screening for colon cancer  -     Ambulatory Referral to Gastroenterology; Future  6. Nicotine dependence, cigarettes, in remission  -     CT lung screening program; Future; Expected date: 2024  7. Chronic radicular pain of lower back  Assessment & Plan:  Following up with pain management. Stable.   8. Primary osteoarthritis of both wrists  Assessment & Plan:  No issues at this time. Stable.   9. Chronic cervical pain  Assessment & Plan:  Currently on prednisone taper. Pt reports improvement overall.  Continue follow up with ortho/pain management as recommend by specialty.   10. Familial hypercholesterolemia  Assessment & Plan:  Reviewed lipid panel with pt in the office.  Tolerating Crestor without issus at this time.  Continue medications as directed.       Depression Screening and Follow-up Plan: Patient's depression screening was positive with a PHQ-9 score of 6. Patient assessed for underlying major depression. Brief counseling provided and recommend additional follow-up/re-evaluation next office visit. Patient advised to  follow-up with PCP for further management.     Urinary Incontinence Plan of Care: counseling topics discussed: use restroom every 2 hours.       Preventive health issues were discussed with patient, and age appropriate screening tests were ordered as noted in patient's After Visit Summary. Personalized health advice and appropriate referrals for health education or preventive services given if needed, as noted in patient's After Visit Summary.    History of Present Illness     Joselyn is a 69 year old female with spondylolisthesis of lumbosacral spine, hypertension, hyperlipidemia, anxiety, depression, chronic cervical pan and osteoarthritis of both wrists, who presents to the office for annual medicare wellness visit. Reports that her mood has improved with use paxil. Denies side effects at this time. Reports chronic neck and back pain which prevents her from doing her normal activities such as playing with her grandchildren and reports this is frustrating for her. Denies fever, chills, chest pain, shortness of breath, n/v/d.        Patient Care Team:  MITZI Otero as PCP - General (Family Medicine)    Review of Systems   Constitutional:  Negative for diaphoresis, fatigue and fever.   HENT:  Negative for ear pain and hearing loss.    Eyes:  Negative for pain and visual disturbance.   Respiratory:  Negative for chest tightness and shortness of breath.    Cardiovascular:  Negative for chest pain, palpitations and leg swelling.   Gastrointestinal:  Negative for abdominal pain, constipation and diarrhea.   Genitourinary:  Negative for difficulty urinating.   Musculoskeletal:  Positive for arthralgias, back pain and neck pain. Negative for myalgias.   Skin:  Negative for rash.   Neurological:  Negative for dizziness, numbness and headaches.   Psychiatric/Behavioral:  Negative for dysphoric mood and sleep disturbance.      Medical History Reviewed by provider this encounter:  Tobacco  Allergies  Problems  Med  Hx  Surg Hx  Fam Hx       Annual Wellness Visit Questionnaire   Joselyn is here for her Subsequent Wellness visit.     Health Risk Assessment:   Patient rates overall health as fair. Patient feels that their physical health rating is much worse. Patient is dissatisfied with their life. Eyesight was rated as slightly worse. Hearing was rated as slightly worse. Patient feels that their emotional and mental health rating is slightly better. Patients states they are sometimes angry. Patient states they are often unusually tired/fatigued. Pain experienced in the last 7 days has been a lot. Patient's pain rating has been 10/10. Patient states that she has experienced no weight loss or gain in last 6 months.     Depression Screening:   PHQ-9 Score: 6      Fall Risk Screening:   In the past year, patient has experienced: no history of falling in past year      Urinary Incontinence Screening:   Patient has leaked urine accidently in the last six months. occasionally    Home Safety:  Patient does not have trouble with stairs inside or outside of their home. Patient has working smoke alarms and has working carbon monoxide detector. Home safety hazards include: none.     Nutrition:   Current diet is Regular and Limited junk food.     Medications:   Patient is currently taking over-the-counter supplements. OTC medications include: see medication list. Patient is able to manage medications.     Activities of Daily Living (ADLs)/Instrumental Activities of Daily Living (IADLs):   Walk and transfer into and out of bed and chair?: Yes  Dress and groom yourself?: Yes    Bathe or shower yourself?: Yes    Feed yourself? Yes  Do your laundry/housekeeping?: Yes  Manage your money, pay your bills and track your expenses?: Yes  Make your own meals?: Yes    Do your own shopping?: Yes    Previous Hospitalizations:   Any hospitalizations or ED visits within the last 12 months?: No      Advance Care Planning:   Living will: No    Durable POA  for healthcare: Yes    Advanced directive: Yes    Advanced directive counseling given: Yes    ACP document given: Yes    End of Life Decisions reviewed with patient: Yes    Provider agrees with end of life decisions: Yes      Cognitive Screening:   Provider or family/friend/caregiver concerned regarding cognition?: No    PREVENTIVE SCREENINGS      Cardiovascular Screening:    General: Screening Not Indicated and History Lipid Disorder      Diabetes Screening:     General: Screening Current      Colorectal Cancer Screening:     General: Risks and Benefits Discussed    Due for: Colonoscopy - Low Risk      Breast Cancer Screening:     General: Risks and Benefits Discussed    Due for: Mammogram        Cervical Cancer Screening:    General: Screening Not Indicated      Osteoporosis Screening:    General: Screening Current      Hepatitis C Screening:    General: Screening Current    Screening, Brief Intervention, and Referral to Treatment (SBIRT)    Screening  Typical number of drinks in a day: 0  Typical number of drinks in a week: 0  Interpretation: Low risk drinking behavior.    Single Item Drug Screening:  How often have you used an illegal drug (including marijuana) or a prescription medication for non-medical reasons in the past year? never    Single Item Drug Screen Score: 0  Interpretation: Negative screen for possible drug use disorder    Review of Current Opioid Use  Opioid Risk Tool (ORT) Score: 1  Opioid Risk Tool (ORT) Interpretation: Score 0-3: Low risk for opioid misuse    Depression Screening Follow-up Plan: Patient's depression screening was positive with a PHQ-2 score of . Their PHQ-9 score was 6. Patient assessed for underlying major depression. They have no active suicidal ideations. Brief counseling provided and recommend additional follow-up/re-evaluation next office visit. Patient advised to follow-up with PCP for further management.      Social Determinants of Health     Food Insecurity: No Food  "Insecurity (6/20/2024)    Hunger Vital Sign     Worried About Running Out of Food in the Last Year: Never true     Ran Out of Food in the Last Year: Never true   Transportation Needs: No Transportation Needs (6/20/2024)    PRAPARE - Transportation     Lack of Transportation (Medical): No     Lack of Transportation (Non-Medical): No   Housing Stability: Low Risk  (6/20/2024)    Housing Stability Vital Sign     Unable to Pay for Housing in the Last Year: No     Number of Times Moved in the Last Year: 0     Homeless in the Last Year: No   Utilities: Not At Risk (6/20/2024)    [facility] Utilities     Threatened with loss of utilities: No     No results found.    Objective     /86 (BP Location: Left arm, Patient Position: Sitting, Cuff Size: Large)   Pulse 55   Temp 97.7 °F (36.5 °C) (Temporal)   Resp 16   Ht 5' 2\" (1.575 m)   Wt 52.2 kg (115 lb)   SpO2 94%   BMI 21.03 kg/m²     Physical Exam  Vitals reviewed.   Constitutional:       General: She is not in acute distress.     Appearance: Normal appearance. She is well-developed. She is not diaphoretic.   HENT:      Head: Normocephalic and atraumatic.      Right Ear: Tympanic membrane, ear canal and external ear normal.      Left Ear: Tympanic membrane, ear canal and external ear normal.      Mouth/Throat:      Mouth: Oropharynx is clear and moist and mucous membranes are normal.   Eyes:      General: Lids are normal.         Right eye: No discharge.         Left eye: No discharge.      Extraocular Movements: Extraocular movements intact and EOM normal.      Conjunctiva/sclera: Conjunctivae normal.      Pupils: Pupils are equal, round, and reactive to light.      Funduscopic exam:     Right eye: Red reflex present.         Left eye: Red reflex present.  Neck:      Thyroid: No thyroid mass or thyromegaly.      Vascular: No carotid bruit.      Trachea: No tracheal deviation.   Cardiovascular:      Rate and Rhythm: Normal rate and regular rhythm.      Pulses: Normal " pulses.      Heart sounds: Normal heart sounds, S1 normal and S2 normal. No murmur heard.  Pulmonary:      Effort: Pulmonary effort is normal.      Breath sounds: Normal breath sounds. No stridor. No decreased breath sounds, wheezing, rhonchi or rales.   Chest:      Chest wall: No tenderness.   Musculoskeletal:         General: Normal range of motion.      Cervical back: Full passive range of motion without pain, normal range of motion and neck supple.      Right lower leg: No edema.      Left lower leg: No edema.   Lymphadenopathy:      Cervical: No cervical adenopathy.      Upper Body:      Right upper body: No supraclavicular adenopathy.      Left upper body: No supraclavicular adenopathy.   Skin:     General: Skin is warm and dry.      Capillary Refill: Capillary refill takes less than 2 seconds.      Findings: No erythema or rash.   Neurological:      General: No focal deficit present.      Mental Status: She is alert and oriented to person, place, and time.      Cranial Nerves: No cranial nerve deficit.      Sensory: No sensory deficit.      Motor: No abnormal muscle tone.      Coordination: Coordination normal.      Deep Tendon Reflexes: Reflexes are normal and symmetric. Reflexes normal.   Psychiatric:         Mood and Affect: Mood and affect normal.         Behavior: Behavior normal.         Thought Content: Thought content normal.         Judgment: Judgment normal.       Administrative Statements   I have spent a total time of 40 minutes on 06/20/24 In caring for this patient including Instructions for management, Patient and family education, Importance of tx compliance, Risk factor reductions, Impressions, Counseling / Coordination of care, and Documenting in the medical record.         No

## 2025-02-06 NOTE — ED PROVIDER NOTE - INTERPRETATION
PT Evaluation     Today's date: 2025  Patient name: Luz Elena Sherman  : 1958  MRN: [id number]  Referring provider: Tiarra Lloyd,*  Dx:   Encounter Diagnosis     ICD-10-CM    1. Vertigo  R42 Ambulatory referral to Physical Therapy          Start Time: 171  Stop Time: 1750  Total time in clinic (min): 38 minutes    Assessment  Impairments: activity intolerance, impaired balance, lacks appropriate home exercise program, safety issue and unable to perform ADL  Symptom irritability: low    Assessment details: Luz Elena Sherman is a 66 y.o. female who presents to therapy with cc of dizziness. Sx's have significantly resolved over the past few weeks. She does present with a positive head thrust consistent with impaired VOR. Pt provided with HEP and will follow up prn if sx's worsen/return.    Therapist explained to pt: findings of IE, rehab diagnosis, and POC. Pt-centered goals reviewed and confirmed by pt. Pt expressed verbal agreement and understanding and verified understanding via teach back method. Pt also expressed satisfaction that their current concerns were addressed at session end.       Understanding of Dx/Px/POC: good     Prognosis: good    Goals  NA - HEP only      Plan  Patient would benefit from: skilled physical therapy  Planned modality interventions: TENS, cryotherapy, ultrasound, low level laser therapy and thermotherapy: hydrocollator packs    Planned therapy interventions: patient education, strengthening, stretching, therapeutic activities, therapeutic exercise, home exercise program, functional ROM exercises, flexibility, neuromuscular re-education, body mechanics training and balance    Frequency: 1x week  Duration in weeks: 1  Treatment plan discussed with: patient        Subjective Evaluation    History of Present Illness  Mechanism of injury: trauma  Mechanism of injury: Pt presents with dx of vertigo. States she was diagnosed with meniere's 16 years ago. Takes meds daily for  this. Knows her triggers and does well if she avoids her triggers. Hasn't had episodes in years. Just gets mild vertigo with salt, caffeine, sugar, looking up or neck rotation. States today she is feeling pretty good. States bending over and getting out of bed certain ways will bring on vertigo at times. Also getting up from office chair, must perform slowly. This has been for the past decade. The end of Nov 2024, was mopping the floor, a chair fell off the table and on her head. States she was vomiting. Was diagnosed with a concussion. Had vertigo x12 hours then subsided. States she hasn't quite recovered from this. States in the past few weeks her sx's have improved. Not sure she really needs therapy anymore but wanted to come in for consult today. States she does have tinnitus. Feels very clear today and lately.    Does report room spinning dizziness when going from sitting to supine.    Declined BPPV testing today as she is feeling better and would like to avoid increasing sx's      Pain  No pain reported    Social Support  Lives with: alone    Treatments  No previous or current treatments        Objective    Saccades: normal  Smooth pursuits: normal  Resting nystagmus: none    Head thrust (+)        Hallpike: not tested/deferred           Precautions: DM, Menieres      Manuals 2/6                                                                Neuro Re-Ed             VOR x1 UD, LR X20 ea HEP                                                                                          Ther Ex                                                                                                                     Ther Activity                                       Gait Training                                       Modalities                                             normal sinus rhythm, Normal axis, Normal RI interval and QRS complex. There are no acute ischemic ST or T-wave changes.

## 2025-04-01 NOTE — H&P ADULT. - DIAGNOSTIC AND THERAPEUTIC PLAN DISCUSSED WITH
- Encourage diet adherence and portion size control.   - Monitor PO intake/tolerance, weights, labs, BM's, and skin integrity.   
Tessie